# Patient Record
Sex: FEMALE | Race: WHITE | Employment: UNEMPLOYED | URBAN - METROPOLITAN AREA
[De-identification: names, ages, dates, MRNs, and addresses within clinical notes are randomized per-mention and may not be internally consistent; named-entity substitution may affect disease eponyms.]

---

## 2023-02-12 ENCOUNTER — HOSPITAL ENCOUNTER (INPATIENT)
Age: 58
LOS: 4 days | Discharge: ANOTHER HOSPITAL | DRG: 871 | End: 2023-02-16
Attending: EMERGENCY MEDICINE | Admitting: INTERNAL MEDICINE
Payer: MEDICARE

## 2023-02-12 ENCOUNTER — APPOINTMENT (OUTPATIENT)
Dept: CT IMAGING | Age: 58
DRG: 871 | End: 2023-02-12
Payer: MEDICARE

## 2023-02-12 ENCOUNTER — APPOINTMENT (OUTPATIENT)
Dept: GENERAL RADIOLOGY | Age: 58
DRG: 871 | End: 2023-02-12
Payer: MEDICARE
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
## 2023-02-12 DIAGNOSIS — E87.1 HYPONATREMIA: ICD-10-CM

## 2023-02-12 DIAGNOSIS — J81.0 ACUTE PULMONARY EDEMA (HCC): ICD-10-CM

## 2023-02-12 DIAGNOSIS — R65.20 SEVERE SEPSIS (HCC): ICD-10-CM

## 2023-02-12 DIAGNOSIS — A41.9 SEVERE SEPSIS (HCC): ICD-10-CM

## 2023-02-12 DIAGNOSIS — R11.2 NAUSEA AND VOMITING, UNSPECIFIED VOMITING TYPE: ICD-10-CM

## 2023-02-12 DIAGNOSIS — I95.9 HYPOTENSION, UNSPECIFIED HYPOTENSION TYPE: ICD-10-CM

## 2023-02-12 LAB
ALBUMIN SERPL-MCNC: 3.8 GM/DL (ref 3.4–5)
ALP BLD-CCNC: 89 IU/L (ref 40–129)
ALT SERPL-CCNC: 21 U/L (ref 10–40)
ANION GAP SERPL CALCULATED.3IONS-SCNC: 15 MMOL/L (ref 4–16)
AST SERPL-CCNC: 145 IU/L (ref 15–37)
BACTERIA: NEGATIVE /HPF
BASOPHILS ABSOLUTE: 0 K/CU MM
BASOPHILS RELATIVE PERCENT: 0.3 % (ref 0–1)
BILIRUB SERPL-MCNC: 1.2 MG/DL (ref 0–1)
BILIRUBIN URINE: ABNORMAL MG/DL
BLOOD, URINE: ABNORMAL
BUN SERPL-MCNC: 17 MG/DL (ref 6–23)
CALCIUM SERPL-MCNC: 9 MG/DL (ref 8.3–10.6)
CHLORIDE BLD-SCNC: 90 MMOL/L (ref 99–110)
CLARITY: CLEAR
CO2: 24 MMOL/L (ref 21–32)
COLOR: YELLOW
CREAT SERPL-MCNC: 0.8 MG/DL (ref 0.6–1.1)
DIFFERENTIAL TYPE: ABNORMAL
EOSINOPHILS ABSOLUTE: 0 K/CU MM
EOSINOPHILS RELATIVE PERCENT: 0 % (ref 0–3)
GFR SERPL CREATININE-BSD FRML MDRD: >60 ML/MIN/1.73M2
GLUCOSE SERPL-MCNC: 339 MG/DL (ref 70–99)
GLUCOSE, URINE: 500 MG/DL
HCT VFR BLD CALC: 42.3 % (ref 37–47)
HEMOGLOBIN: 12.9 GM/DL (ref 12.5–16)
IMMATURE NEUTROPHIL %: 0.6 % (ref 0–0.43)
KETONES, URINE: >80 MG/DL
LACTIC ACID, SEPSIS: 2.9 MMOL/L (ref 0.5–1.9)
LEUKOCYTE ESTERASE, URINE: NEGATIVE
LIPASE: 8 IU/L (ref 13–60)
LYMPHOCYTES ABSOLUTE: 0.5 K/CU MM
LYMPHOCYTES RELATIVE PERCENT: 5.8 % (ref 24–44)
MCH RBC QN AUTO: 26.4 PG (ref 27–31)
MCHC RBC AUTO-ENTMCNC: 30.5 % (ref 32–36)
MCV RBC AUTO: 86.5 FL (ref 78–100)
MONOCYTES ABSOLUTE: 0.5 K/CU MM
MONOCYTES RELATIVE PERCENT: 6 % (ref 0–4)
NITRITE URINE, QUANTITATIVE: NEGATIVE
NON SQUAM EPI CELLS: <1 /HPF
NUCLEATED RBC %: 0 %
PDW BLD-RTO: 13.4 % (ref 11.7–14.9)
PH, URINE: 6 (ref 5–8)
PLATELET # BLD: 132 K/CU MM (ref 140–440)
PMV BLD AUTO: 11.3 FL (ref 7.5–11.1)
POTASSIUM SERPL-SCNC: 4 MMOL/L (ref 3.5–5.1)
PROTEIN UA: >300 MG/DL
RAPID INFLUENZA  B AGN: NEGATIVE
RAPID INFLUENZA A AGN: NEGATIVE
RBC # BLD: 4.89 M/CU MM (ref 4.2–5.4)
RBC URINE: 1 /HPF (ref 0–6)
SARS-COV-2 RDRP RESP QL NAA+PROBE: NOT DETECTED
SEGMENTED NEUTROPHILS ABSOLUTE COUNT: 7.6 K/CU MM
SEGMENTED NEUTROPHILS RELATIVE PERCENT: 87.3 % (ref 36–66)
SODIUM BLD-SCNC: 129 MMOL/L (ref 135–145)
SOURCE: NORMAL
SPECIFIC GRAVITY UA: 1.02 (ref 1–1.03)
SQUAMOUS EPITHELIAL: 2 /HPF
TOTAL CK: 9595 IU/L (ref 26–140)
TOTAL IMMATURE NEUTOROPHIL: 0.05 K/CU MM
TOTAL NUCLEATED RBC: 0 K/CU MM
TOTAL PROTEIN: 7.5 GM/DL (ref 6.4–8.2)
TRICHOMONAS: NORMAL /HPF
UROBILINOGEN, URINE: 1 MG/DL (ref 0.2–1)
WBC # BLD: 8.7 K/CU MM (ref 4–10.5)
WBC UA: NORMAL /HPF (ref 0–5)

## 2023-02-12 PROCEDURE — 83605 ASSAY OF LACTIC ACID: CPT

## 2023-02-12 PROCEDURE — C1751 CATH, INF, PER/CENT/MIDLINE: HCPCS

## 2023-02-12 PROCEDURE — 96375 TX/PRO/DX INJ NEW DRUG ADDON: CPT

## 2023-02-12 PROCEDURE — 83690 ASSAY OF LIPASE: CPT

## 2023-02-12 PROCEDURE — 71045 X-RAY EXAM CHEST 1 VIEW: CPT

## 2023-02-12 PROCEDURE — 85025 COMPLETE CBC W/AUTO DIFF WBC: CPT

## 2023-02-12 PROCEDURE — 87150 DNA/RNA AMPLIFIED PROBE: CPT

## 2023-02-12 PROCEDURE — 87040 BLOOD CULTURE FOR BACTERIA: CPT

## 2023-02-12 PROCEDURE — 2140000000 HC CCU INTERMEDIATE R&B

## 2023-02-12 PROCEDURE — 6370000000 HC RX 637 (ALT 250 FOR IP): Performed by: EMERGENCY MEDICINE

## 2023-02-12 PROCEDURE — 99285 EMERGENCY DEPT VISIT HI MDM: CPT

## 2023-02-12 PROCEDURE — 6360000002 HC RX W HCPCS: Performed by: EMERGENCY MEDICINE

## 2023-02-12 PROCEDURE — 82550 ASSAY OF CK (CPK): CPT

## 2023-02-12 PROCEDURE — 96365 THER/PROPH/DIAG IV INF INIT: CPT

## 2023-02-12 PROCEDURE — 87635 SARS-COV-2 COVID-19 AMP PRB: CPT

## 2023-02-12 PROCEDURE — 2700000000 HC OXYGEN THERAPY PER DAY

## 2023-02-12 PROCEDURE — 96366 THER/PROPH/DIAG IV INF ADDON: CPT

## 2023-02-12 PROCEDURE — 87086 URINE CULTURE/COLONY COUNT: CPT

## 2023-02-12 PROCEDURE — 96367 TX/PROPH/DG ADDL SEQ IV INF: CPT

## 2023-02-12 PROCEDURE — 81001 URINALYSIS AUTO W/SCOPE: CPT

## 2023-02-12 PROCEDURE — 76937 US GUIDE VASCULAR ACCESS: CPT

## 2023-02-12 PROCEDURE — 70450 CT HEAD/BRAIN W/O DYE: CPT

## 2023-02-12 PROCEDURE — 72125 CT NECK SPINE W/O DYE: CPT

## 2023-02-12 PROCEDURE — 87186 SC STD MICRODIL/AGAR DIL: CPT

## 2023-02-12 PROCEDURE — 87804 INFLUENZA ASSAY W/OPTIC: CPT

## 2023-02-12 PROCEDURE — 96361 HYDRATE IV INFUSION ADD-ON: CPT

## 2023-02-12 PROCEDURE — 80053 COMPREHEN METABOLIC PANEL: CPT

## 2023-02-12 PROCEDURE — 2580000003 HC RX 258: Performed by: EMERGENCY MEDICINE

## 2023-02-12 RX ORDER — ONDANSETRON 2 MG/ML
4 INJECTION INTRAMUSCULAR; INTRAVENOUS ONCE
Status: COMPLETED | OUTPATIENT
Start: 2023-02-12 | End: 2023-02-12

## 2023-02-12 RX ORDER — DEXTROSE MONOHYDRATE 100 MG/ML
INJECTION, SOLUTION INTRAVENOUS CONTINUOUS PRN
Status: DISCONTINUED | OUTPATIENT
Start: 2023-02-12 | End: 2023-02-17 | Stop reason: HOSPADM

## 2023-02-12 RX ORDER — INSULIN LISPRO 100 [IU]/ML
0-8 INJECTION, SOLUTION INTRAVENOUS; SUBCUTANEOUS
Status: DISCONTINUED | OUTPATIENT
Start: 2023-02-13 | End: 2023-02-17 | Stop reason: HOSPADM

## 2023-02-12 RX ORDER — GLIPIZIDE 10 MG/1
10 TABLET, FILM COATED, EXTENDED RELEASE ORAL DAILY
COMMUNITY

## 2023-02-12 RX ORDER — SODIUM CHLORIDE 0.9 % (FLUSH) 0.9 %
5-40 SYRINGE (ML) INJECTION PRN
Status: DISCONTINUED | OUTPATIENT
Start: 2023-02-12 | End: 2023-02-17 | Stop reason: HOSPADM

## 2023-02-12 RX ORDER — FUROSEMIDE 40 MG/1
40 TABLET ORAL DAILY
COMMUNITY

## 2023-02-12 RX ORDER — ACETAMINOPHEN 500 MG
1000 TABLET ORAL
Status: COMPLETED | OUTPATIENT
Start: 2023-02-12 | End: 2023-02-12

## 2023-02-12 RX ORDER — SODIUM CHLORIDE 0.9 % (FLUSH) 0.9 %
5-40 SYRINGE (ML) INJECTION EVERY 12 HOURS SCHEDULED
Status: DISCONTINUED | OUTPATIENT
Start: 2023-02-13 | End: 2023-02-17 | Stop reason: HOSPADM

## 2023-02-12 RX ORDER — ACETAMINOPHEN 650 MG/1
650 SUPPOSITORY RECTAL EVERY 6 HOURS PRN
Status: DISCONTINUED | OUTPATIENT
Start: 2023-02-12 | End: 2023-02-17 | Stop reason: HOSPADM

## 2023-02-12 RX ORDER — 0.9 % SODIUM CHLORIDE 0.9 %
1000 INTRAVENOUS SOLUTION INTRAVENOUS ONCE
Status: COMPLETED | OUTPATIENT
Start: 2023-02-12 | End: 2023-02-12

## 2023-02-12 RX ORDER — SODIUM CHLORIDE 9 MG/ML
INJECTION, SOLUTION INTRAVENOUS PRN
Status: DISCONTINUED | OUTPATIENT
Start: 2023-02-12 | End: 2023-02-17 | Stop reason: HOSPADM

## 2023-02-12 RX ORDER — ACETAMINOPHEN 325 MG/1
650 TABLET ORAL EVERY 6 HOURS PRN
Status: DISCONTINUED | OUTPATIENT
Start: 2023-02-12 | End: 2023-02-17 | Stop reason: HOSPADM

## 2023-02-12 RX ORDER — CARVEDILOL 6.25 MG/1
6.25 TABLET ORAL 2 TIMES DAILY WITH MEALS
COMMUNITY

## 2023-02-12 RX ORDER — ENOXAPARIN SODIUM 100 MG/ML
30 INJECTION SUBCUTANEOUS 2 TIMES DAILY
Status: DISCONTINUED | OUTPATIENT
Start: 2023-02-13 | End: 2023-02-13

## 2023-02-12 RX ORDER — 0.9 % SODIUM CHLORIDE 0.9 %
1000 INTRAVENOUS SOLUTION INTRAVENOUS ONCE
Status: COMPLETED | OUTPATIENT
Start: 2023-02-12 | End: 2023-02-13

## 2023-02-12 RX ORDER — SODIUM CHLORIDE 9 MG/ML
INJECTION, SOLUTION INTRAVENOUS CONTINUOUS
Status: DISCONTINUED | OUTPATIENT
Start: 2023-02-13 | End: 2023-02-14

## 2023-02-12 RX ORDER — INSULIN LISPRO 100 [IU]/ML
0-4 INJECTION, SOLUTION INTRAVENOUS; SUBCUTANEOUS NIGHTLY
Status: DISCONTINUED | OUTPATIENT
Start: 2023-02-13 | End: 2023-02-17 | Stop reason: HOSPADM

## 2023-02-12 RX ADMIN — PIPERACILLIN AND TAZOBACTAM 3375 MG: 3; .375 INJECTION, POWDER, LYOPHILIZED, FOR SOLUTION INTRAVENOUS at 18:30

## 2023-02-12 RX ADMIN — SODIUM CHLORIDE 1000 ML: 9 INJECTION, SOLUTION INTRAVENOUS at 17:37

## 2023-02-12 RX ADMIN — ONDANSETRON 4 MG: 2 INJECTION INTRAMUSCULAR; INTRAVENOUS at 17:37

## 2023-02-12 RX ADMIN — ACETAMINOPHEN 1000 MG: 500 TABLET ORAL at 18:20

## 2023-02-12 ASSESSMENT — PAIN - FUNCTIONAL ASSESSMENT: PAIN_FUNCTIONAL_ASSESSMENT: NONE - DENIES PAIN

## 2023-02-12 NOTE — ED PROVIDER NOTES
EMERGENCY DEPARTMENT ENCOUNTER      CHIEF COMPLAINT:   Fall from bed  Nausea and vomiting    HPI: Stanislaw Anderson is a 62 y.o. female who presents to the emergency department, via EMS, after she was found on the floor by her family. The patient states that she fell out of bed last night and was unable to get up. She feels generally weak. She has been vomiting this morning. She hit her head but does not think she lost consciousness. She denies neck pain. She denies hematemesis or abdominal pain. She has had subjective fevers and chills. Symptoms have been intermittent. There are no exacerbating or alleviating factors. She denies any other injuries or complaints. REVIEW OF SYSTEMS:   Constitutional: See HPI  Eyes:  Denies change in visual acuity  HENT:  Denies nasal congestion or sore throat  Respiratory:  Denies cough or shortness of breath  Cardiovascular:  Denies chest pain or edema  GI: See HPI  :  Denies dysuria  Musculoskeletal:  Denies back pain or joint pain  Integument:  Denies rash  Neurologic:  Denies headache, focal weakness or sensory changes  \"Remaining review of systems reviewed and negative. I have reviewed the nursing triage documentation and agree unless otherwise noted below. \"      PAST MEDICAL HISTORY:   History reviewed. No pertinent past medical history. CURRENT MEDICATIONS:   Home medications reviewed. SURGICAL HISTORY:   History reviewed. No pertinent surgical history. FAMILY HISTORY:   History reviewed. No pertinent family history.     SOCIAL HISTORY:   Social History     Socioeconomic History    Marital status:      Spouse name: Not on file    Number of children: Not on file    Years of education: Not on file    Highest education level: Not on file   Occupational History    Not on file   Tobacco Use    Smoking status: Never    Smokeless tobacco: Never   Substance and Sexual Activity    Alcohol use: Not Currently    Drug use: Not Currently    Sexual activity: Not on file   Other Topics Concern    Not on file   Social History Narrative    Not on file     Social Determinants of Health     Financial Resource Strain: Not on file   Food Insecurity: Not on file   Transportation Needs: Not on file   Physical Activity: Not on file   Stress: Not on file   Social Connections: Not on file   Intimate Partner Violence: Not on file   Housing Stability: Not on file       ALLERGIES: Patient has no known allergies. PHYSICAL EXAM:  VITAL SIGNS:   ED Triage Vitals [02/12/23 1518]   Enc Vitals Group      BP (!) 157/91      Heart Rate (!) 123      Resp 22      Temp 100.2 °F (37.9 °C)      Temp Source Oral      SpO2 98 %      Weight 300 lb (136.1 kg)      Height 5' 5\" (1.651 m)      Head Circumference       Peak Flow       Pain Score       Pain Loc       Pain Edu? Excl. in [address]? Constitutional:  Non-toxic appearance, generally weak  HENT: Normocephalic, Atraumatic, Bilateral external ears normal, Oropharynx moist, No oral exudates, Nose normal.  Eyes:  PERRL,  Conjunctiva normal, No discharge. Neck: Normal range of motion, No tenderness, Supple, No stridor, No lymphadenopathy. Cardiovascular: Tachycardic, regular rhythm  Pulmonary/Chest: Lung sounds are diminished bilaterally with bibasilar crackles, tachypneic  Abdomen:   Bowel sounds normal, Soft, No tenderness, No masses, No pulsatile masses  Extremities:  Normal range of motion, Intact distal pulses, No edema, No tenderness  Neurologic:  Alert & oriented x 3, Normal motor function, Sensation intact to light touch throughout, No focal deficits  Skin:  Warm, Dry, No erythema, there are superficial scabbed wounds noted to bilateral shoulder blades, no surrounding erythema, no drainage      EKG Interpretation  None    Cardiac Monitor Strip Interpretation  Interpreted by me  Monitor strip interpreted for greater than 10 seconds  Rhythm: Sinus tachycardia  Rate: Tachycardic  Ectopy: none  ST Segments: normal      Radiology / Procedures:     XR CHEST PORTABLE (Final result)  Result time 02/12/23 18:23:10  Final result by Manuela Zamora MD (02/12/23 18:23:10)                Impression:    1. Cardiomegaly and findings favored to reflect pulmonary edema. Narrative:    EXAMINATION:   ONE XRAY VIEW OF THE CHEST     2/12/2023 5:54 pm     COMPARISON:   Chest x-ray September 19, 2008     HISTORY:   ORDERING SYSTEM PROVIDED HISTORY: Fall   TECHNOLOGIST PROVIDED HISTORY:   Reason for exam:->Fall   Reason for Exam: fall     FINDINGS:   Cardiac silhouette is enlarged but grossly stable given changes in technique   when compared with prior exam.  Central pulmonary vascular congestion and   mild interstitial opacities favored to reflect changes related to pulmonary   edema. No large effusion. No pneumothorax. CT CERVICAL SPINE WO CONTRAST (Final result)  Result time 02/12/23 17:33:37  Final result by Harles Ormond, MD (02/12/23 17:33:37)                Impression:    No acute abnormality of the cervical spine. Narrative:    EXAMINATION:   CT OF THE CERVICAL SPINE WITHOUT CONTRAST 2/12/2023 4:25 pm     TECHNIQUE:   CT of the cervical spine was performed without the administration of   intravenous contrast. Multiplanar reformatted images are provided for review. Automated exposure control, iterative reconstruction, and/or weight based   adjustment of the mA/kV was utilized to reduce the radiation dose to as low   as reasonably achievable. COMPARISON:   None. HISTORY:   ORDERING SYSTEM PROVIDED HISTORY: Fall from bed   TECHNOLOGIST PROVIDED HISTORY:   Reason for exam:->Fall from bed   Decision Support Exception - unselect if not a suspected or confirmed   emergency medical condition->Emergency Medical Condition (MA)   Reason for Exam: FALL, NECK PAIN     FINDINGS:   BONES/ALIGNMENT: There is no acute fracture or traumatic malalignment.      DEGENERATIVE CHANGES: Calcification of the posterior longitudinal ligament at   C3-C4, which causes eccentric narrowing of the spinal canal.     SOFT TISSUES: There is no prevertebral soft tissue swelling. CT HEAD WO CONTRAST (Final result)  Result time 02/12/23 17:29:56  Final result by Daiana Cardoso MD (02/12/23 17:29:56)                Impression:    No acute intracranial abnormality. Narrative:    EXAMINATION:   CT OF THE HEAD WITHOUT CONTRAST  2/12/2023 4:25 pm     TECHNIQUE:   CT of the head was performed without the administration of intravenous   contrast. Automated exposure control, iterative reconstruction, and/or weight   based adjustment of the mA/kV was utilized to reduce the radiation dose to as   low as reasonably achievable. COMPARISON:   None. HISTORY:   ORDERING SYSTEM PROVIDED HISTORY: Fall, hit head   TECHNOLOGIST PROVIDED HISTORY:   Has a \"code stroke\" or \"stroke alert\" been called? ->No   Reason for exam:->Fall, hit head   Decision Support Exception - unselect if not a suspected or confirmed   emergency medical condition->Emergency Medical Condition (MA)   Reason for Exam: Fall, hit head     FINDINGS:   BRAIN/VENTRICLES: There is no acute intracranial hemorrhage, mass effect or   midline shift. No abnormal extra-axial fluid collection. The gray-white   differentiation is maintained without evidence of an acute infarct. There is   no evidence of hydrocephalus. ORBITS: The visualized portion of the orbits demonstrate no acute abnormality. SINUSES: The visualized paranasal sinuses and mastoid air cells demonstrate   no acute abnormality. SOFT TISSUES/SKULL:  No acute abnormality of the visualized skull or soft   tissues.                    Labs Reviewed   CBC WITH AUTO DIFFERENTIAL - Abnormal; Notable for the following components:       Result Value    MCH 26.4 (*)     MCHC 30.5 (*)     Platelets 539 (*)     MPV 11.3 (*)     Segs Relative 87.3 (*)     Lymphocytes % 5.8 (*)     Monocytes % 6.0 (*)     Immature Neutrophil % 0.6 (*)     All other components within normal limits   COMPREHENSIVE METABOLIC PANEL - Abnormal; Notable for the following components:    Sodium 129 (*)     Chloride 90 (*)     Glucose 339 (*)     Total Bilirubin 1.2 (*)      (*)     All other components within normal limits   LIPASE - Abnormal; Notable for the following components:    Lipase 8 (*)     All other components within normal limits   URINALYSIS - Abnormal; Notable for the following components:    Glucose, Urine 500 (*)     Bilirubin Urine MODERATE NUMBER OR AMOUNT OBSERVED (*)     Ketones, Urine >80 (*)     Blood, Urine LARGE NUMBER OR AMOUNT OBSERVED (*)     Protein, UA >300 (*)     All other components within normal limits   LACTATE, SEPSIS - Abnormal; Notable for the following components:    Lactic Acid, Sepsis 2.9 (*)     All other components within normal limits   CK - Abnormal; Notable for the following components:    Total CK 9,595 (*)     All other components within normal limits   RAPID INFLUENZA A/B ANTIGENS   COVID-19, RAPID   CULTURE, URINE   CULTURE, BLOOD 1   CULTURE, BLOOD 2   MICROSCOPIC URINALYSIS   LACTATE, SEPSIS   BRAIN NATRIURETIC PEPTIDE   PROCALCITONIN   LACTIC ACID   LACTATE, SEPSIS   LACTATE, SEPSIS   COMPREHENSIVE METABOLIC PANEL W/ REFLEX TO MG FOR LOW K   CK   CBC WITH AUTO DIFFERENTIAL         ED COURSE & MEDICAL DECISION MAKING:  Carol Beriros is a 57 y.o. female who presents to the emergency department, via EMS, after she was found on the floor by her family.  The patient states that she fell out of bed last night and was unable to get up.  She feels generally weak.  She has been vomiting this morning.  She hit her head but does not think she lost consciousness.  She denies neck pain.  She denies hematemesis or abdominal pain.  She has had subjective fevers and chills.  Symptoms have been intermittent.  There are no exacerbating or alleviating factors.  She denies any other injuries or  complaints. History from : Patient    Limitations to history : None    Chronic conditions affecting care: None    Social Determinants : None    Records Reviewed : None    On exam, the patient has an elevated temperature of 100.2 °F.  She later spiked a fever of 102 °F.  She is tachycardic. She is hypertensive on arrival but is asymptomatic and later becomes hypotensive. She is tachypneic. She is otherwise hemodynamically stable and neurologically intact. Physical exam is significant for bilateral superficial scabbed lesions on her shoulder blades. Labs are obtained and are significant for a normal white blood cell count, a normal hemoglobin, elevated segmented neutrophils, mild hyponatremia that is likely pseudohyponatremia secondary to an elevated glucose of 339, slightly elevated bilirubin, and elevated lactic acid of 2.9 and an elevated CK of 9595. There are no other clinically significant lab abnormalities. Blood cultures are pending. CT head is negative for acute abnormality. CT cervical spine is negative for acute abnormality. Chest x-ray shows cardiomegaly and findings favored to reflect pulmonary edema. The patient was treated with medications as below a with some improvement. The patient was only given 1 L of IV normal saline in the emergency department despite being septic due to pulmonary edema seen on chest x-ray and tachypnea with bibasilar crackles.     Patient was given the following medications:  Medications   0.9 % sodium chloride bolus (1000 mLs IntraVENous Stopped 2/12/23 1931)   ondansetron (ZOFRAN) injection 4 mg (4 mg IntraVENous Given 2/12/23 1737)   piperacillin-tazobactam (ZOSYN) 3,375 mg in sodium chloride 0.9 % 50 mL IVPB (mini-bag) (0 mg IntraVENous Stopped 2/12/23 1903)   acetaminophen (TYLENOL) tablet 1,000 mg (1,000 mg Oral Given 2/12/23 1820)   vancomycin (VANCOCIN) 2,000 mg in sodium chloride 0.9 % 500 mL IVPB (0 mg IntraVENous Stopped 2/12/23 2213)     Diagnosis and Differential Diagnosis:  I suspect that the patient had a mechanical fall from bed and is in rhabdomyolysis. She also has a fever, severe sepsis and vomiting. She may have a GI source of her sepsis versus another undetermined source. I have a low suspicion for intracranial hemorrhage, skull fracture, cervical spine fracture dislocation, pneumonia, UTI or acute surgical abdomen. Disposition Considerations:  I recommended admission to the hospital and the patient was agreeable. I discussed the case with the hospitalist who will admit the patient for further treatment and care. She requested a CT of the chest, abdomen and pelvis which has been ordered. The patient is currently in stable condition awaiting admission. CRITICAL CARE NOTE:  There was a high probability of clinically significant life-threatening deterioration of the patient's condition requiring my urgent intervention. Total critical care time is 45 minutes  This includes vital sign monitoring, pulse oximetry monitoring, telemetry monitoring, clinical response to the IV medications, reviewing the nursing notes, consultation time, dictation/documetation time. (This time excludes time spent performing procedures). I am the Primary Clinician of Record. Clinical Impression:  1. Fall from bed, initial encounter    2. Nausea and vomiting, unspecified vomiting type    3. Severe sepsis ([address]    4. Hyponatremia    5. Acute pulmonary edema (HCC)    6. Hypotension, unspecified hypotension type    7. Rhabdomyolysis      Comment: Please note this report has been produced using speech recognition software and may contain errors related to that system including errors in grammar, punctuation, and spelling, as well as words and phrases that may be inappropriate. If there are any questions or concerns please feel free to contact the dictating provider for clarification.         Eliecer Calabrese MD  02/17/23 8812

## 2023-02-12 NOTE — ED NOTES
All of patient's clothing and linens removed as they were saturated in old urine. Pt noted to have wounds to bilateral shoulder blades- do not appear to be new. Pt is maximum assist and very weak.        Anibal Yip RN  02/12/23 7700

## 2023-02-13 ENCOUNTER — APPOINTMENT (OUTPATIENT)
Dept: GENERAL RADIOLOGY | Age: 58
DRG: 871 | End: 2023-02-13
Payer: MEDICARE

## 2023-02-13 ENCOUNTER — APPOINTMENT (OUTPATIENT)
Dept: CT IMAGING | Age: 58
DRG: 871 | End: 2023-02-13
Payer: MEDICARE
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
## 2023-02-13 PROBLEM — A41.01 SEPSIS DUE TO METHICILLIN SUSCEPTIBLE STAPHYLOCOCCUS AUREUS (MSSA) WITHOUT ACUTE ORGAN DYSFUNCTION (HCC): Status: ACTIVE | Noted: 2023-02-13

## 2023-02-13 PROBLEM — B95.61 STAPHYLOCOCCUS AUREUS BACTEREMIA: Status: ACTIVE | Noted: 2023-02-13

## 2023-02-13 PROBLEM — E66.01 CLASS 3 SEVERE OBESITY DUE TO EXCESS CALORIES WITH SERIOUS COMORBIDITY AND BODY MASS INDEX (BMI) OF 50.0 TO 59.9 IN ADULT (HCC): Status: ACTIVE | Noted: 2023-02-13

## 2023-02-13 PROBLEM — R78.81 STAPHYLOCOCCUS AUREUS BACTEREMIA: Status: ACTIVE | Noted: 2023-02-13

## 2023-02-13 LAB
ALBUMIN SERPL-MCNC: 3.6 GM/DL (ref 3.4–5)
ALP BLD-CCNC: 79 IU/L (ref 40–128)
ALT SERPL-CCNC: 28 U/L (ref 10–40)
ANION GAP SERPL CALCULATED.3IONS-SCNC: 16 MMOL/L (ref 4–16)
AST SERPL-CCNC: 224 IU/L (ref 15–37)
BASOPHILS ABSOLUTE: 0 K/CU MM
BASOPHILS RELATIVE PERCENT: 0.2 % (ref 0–1)
BILIRUB SERPL-MCNC: 1.1 MG/DL (ref 0–1)
BUN SERPL-MCNC: 25 MG/DL (ref 6–23)
CALCIUM SERPL-MCNC: 8.2 MG/DL (ref 8.3–10.6)
CHLORIDE BLD-SCNC: 94 MMOL/L (ref 99–110)
CO2: 24 MMOL/L (ref 21–32)
CREAT SERPL-MCNC: 1.3 MG/DL (ref 0.6–1.1)
DIFFERENTIAL TYPE: ABNORMAL
EOSINOPHILS ABSOLUTE: 0 K/CU MM
EOSINOPHILS RELATIVE PERCENT: 0 % (ref 0–3)
GFR SERPL CREATININE-BSD FRML MDRD: 48 ML/MIN/1.73M2
GLUCOSE BLD-MCNC: 216 MG/DL (ref 70–99)
GLUCOSE BLD-MCNC: 281 MG/DL (ref 70–99)
GLUCOSE BLD-MCNC: 341 MG/DL (ref 70–99)
GLUCOSE BLD-MCNC: 344 MG/DL (ref 70–99)
GLUCOSE SERPL-MCNC: 306 MG/DL (ref 70–99)
HCT VFR BLD CALC: 40.6 % (ref 37–47)
HEMOGLOBIN: 12.4 GM/DL (ref 12.5–16)
HIGH SENSITIVE C-REACTIVE PROTEIN: >300 MG/L (ref 0–5)
IMMATURE NEUTROPHIL %: 0.9 % (ref 0–0.43)
LACTIC ACID, SEPSIS: 2.1 MMOL/L (ref 0.5–1.9)
LACTIC ACID, SEPSIS: 2.4 MMOL/L (ref 0.5–1.9)
LYMPHOCYTES ABSOLUTE: 0.6 K/CU MM
LYMPHOCYTES RELATIVE PERCENT: 6.7 % (ref 24–44)
MCH RBC QN AUTO: 26.9 PG (ref 27–31)
MCHC RBC AUTO-ENTMCNC: 30.5 % (ref 32–36)
MCV RBC AUTO: 88.1 FL (ref 78–100)
MONOCYTES ABSOLUTE: 0.7 K/CU MM
MONOCYTES RELATIVE PERCENT: 8.7 % (ref 0–4)
NUCLEATED RBC %: 0 %
PDW BLD-RTO: 13.7 % (ref 11.7–14.9)
PHOSPHORUS: 3.2 MG/DL (ref 2.5–4.9)
PLATELET # BLD: 113 K/CU MM (ref 140–440)
PMV BLD AUTO: 10.9 FL (ref 7.5–11.1)
POTASSIUM SERPL-SCNC: 3.8 MMOL/L (ref 3.5–5.1)
RBC # BLD: 4.61 M/CU MM (ref 4.2–5.4)
SEGMENTED NEUTROPHILS ABSOLUTE COUNT: 6.8 K/CU MM
SEGMENTED NEUTROPHILS RELATIVE PERCENT: 83.5 % (ref 36–66)
SODIUM BLD-SCNC: 134 MMOL/L (ref 135–145)
TOTAL CK: ABNORMAL IU/L (ref 26–140)
TOTAL IMMATURE NEUTOROPHIL: 0.07 K/CU MM
TOTAL NUCLEATED RBC: 0 K/CU MM
TOTAL PROTEIN: 6.2 GM/DL (ref 6.4–8.2)
TSH SERPL DL<=0.005 MIU/L-ACNC: 1.14 UIU/ML (ref 0.27–4.2)
WBC # BLD: 8.2 K/CU MM (ref 4–10.5)

## 2023-02-13 PROCEDURE — 99223 1ST HOSP IP/OBS HIGH 75: CPT | Performed by: INTERNAL MEDICINE

## 2023-02-13 PROCEDURE — 2700000000 HC OXYGEN THERAPY PER DAY

## 2023-02-13 PROCEDURE — 80053 COMPREHEN METABOLIC PANEL: CPT

## 2023-02-13 PROCEDURE — 6360000004 HC RX CONTRAST MEDICATION: Performed by: EMERGENCY MEDICINE

## 2023-02-13 PROCEDURE — 84443 ASSAY THYROID STIM HORMONE: CPT

## 2023-02-13 PROCEDURE — 99222 1ST HOSP IP/OBS MODERATE 55: CPT | Performed by: INTERNAL MEDICINE

## 2023-02-13 PROCEDURE — 6360000002 HC RX W HCPCS: Performed by: STUDENT IN AN ORGANIZED HEALTH CARE EDUCATION/TRAINING PROGRAM

## 2023-02-13 PROCEDURE — APPSS60 APP SPLIT SHARED TIME 46-60 MINUTES: Performed by: NURSE PRACTITIONER

## 2023-02-13 PROCEDURE — 2140000000 HC CCU INTERMEDIATE R&B

## 2023-02-13 PROCEDURE — 36415 COLL VENOUS BLD VENIPUNCTURE: CPT

## 2023-02-13 PROCEDURE — 97166 OT EVAL MOD COMPLEX 45 MIN: CPT

## 2023-02-13 PROCEDURE — 85025 COMPLETE CBC W/AUTO DIFF WBC: CPT

## 2023-02-13 PROCEDURE — 84100 ASSAY OF PHOSPHORUS: CPT

## 2023-02-13 PROCEDURE — 94761 N-INVAS EAR/PLS OXIMETRY MLT: CPT

## 2023-02-13 PROCEDURE — 74177 CT ABD & PELVIS W/CONTRAST: CPT

## 2023-02-13 PROCEDURE — 71045 X-RAY EXAM CHEST 1 VIEW: CPT

## 2023-02-13 PROCEDURE — 97530 THERAPEUTIC ACTIVITIES: CPT

## 2023-02-13 PROCEDURE — 6370000000 HC RX 637 (ALT 250 FOR IP): Performed by: INTERNAL MEDICINE

## 2023-02-13 PROCEDURE — 2580000003 HC RX 258: Performed by: INTERNAL MEDICINE

## 2023-02-13 PROCEDURE — 6360000002 HC RX W HCPCS

## 2023-02-13 PROCEDURE — 97162 PT EVAL MOD COMPLEX 30 MIN: CPT

## 2023-02-13 PROCEDURE — 82550 ASSAY OF CK (CPK): CPT

## 2023-02-13 PROCEDURE — 97112 NEUROMUSCULAR REEDUCATION: CPT

## 2023-02-13 PROCEDURE — 83605 ASSAY OF LACTIC ACID: CPT

## 2023-02-13 PROCEDURE — 6360000002 HC RX W HCPCS: Performed by: INTERNAL MEDICINE

## 2023-02-13 PROCEDURE — 86141 C-REACTIVE PROTEIN HS: CPT

## 2023-02-13 PROCEDURE — 82962 GLUCOSE BLOOD TEST: CPT

## 2023-02-13 RX ORDER — INSULIN GLARGINE 100 [IU]/ML
5 INJECTION, SOLUTION SUBCUTANEOUS
Status: DISCONTINUED | OUTPATIENT
Start: 2023-02-13 | End: 2023-02-15

## 2023-02-13 RX ORDER — HEPARIN SODIUM 5000 [USP'U]/ML
5000 INJECTION, SOLUTION INTRAVENOUS; SUBCUTANEOUS EVERY 8 HOURS SCHEDULED
Status: DISCONTINUED | OUTPATIENT
Start: 2023-02-13 | End: 2023-02-17 | Stop reason: HOSPADM

## 2023-02-13 RX ORDER — ONDANSETRON 2 MG/ML
4 INJECTION INTRAMUSCULAR; INTRAVENOUS EVERY 6 HOURS PRN
Status: DISCONTINUED | OUTPATIENT
Start: 2023-02-13 | End: 2023-02-17 | Stop reason: HOSPADM

## 2023-02-13 RX ADMIN — ACETAMINOPHEN 650 MG: 325 TABLET ORAL at 05:04

## 2023-02-13 RX ADMIN — IOPAMIDOL 75 ML: 755 INJECTION, SOLUTION INTRAVENOUS at 02:36

## 2023-02-13 RX ADMIN — VANCOMYCIN HYDROCHLORIDE 1500 MG: 10 INJECTION, POWDER, LYOPHILIZED, FOR SOLUTION INTRAVENOUS at 16:54

## 2023-02-13 RX ADMIN — INSULIN LISPRO 4 UNITS: 100 INJECTION, SOLUTION INTRAVENOUS; SUBCUTANEOUS at 18:52

## 2023-02-13 RX ADMIN — PIPERACILLIN AND TAZOBACTAM 3375 MG: 3; .375 INJECTION, POWDER, LYOPHILIZED, FOR SOLUTION INTRAVENOUS at 12:24

## 2023-02-13 RX ADMIN — INSULIN LISPRO 6 UNITS: 100 INJECTION, SOLUTION INTRAVENOUS; SUBCUTANEOUS at 09:15

## 2023-02-13 RX ADMIN — SODIUM CHLORIDE: 9 INJECTION, SOLUTION INTRAVENOUS at 22:08

## 2023-02-13 RX ADMIN — SODIUM CHLORIDE: 9 INJECTION, SOLUTION INTRAVENOUS at 04:56

## 2023-02-13 RX ADMIN — HEPARIN SODIUM 5000 UNITS: 5000 INJECTION INTRAVENOUS; SUBCUTANEOUS at 22:06

## 2023-02-13 RX ADMIN — SODIUM CHLORIDE 1000 ML: 9 INJECTION, SOLUTION INTRAVENOUS at 02:40

## 2023-02-13 RX ADMIN — SODIUM CHLORIDE: 9 INJECTION, SOLUTION INTRAVENOUS at 09:49

## 2023-02-13 RX ADMIN — PIPERACILLIN AND TAZOBACTAM 3375 MG: 3; .375 INJECTION, POWDER, LYOPHILIZED, FOR SOLUTION INTRAVENOUS at 04:59

## 2023-02-13 RX ADMIN — ENOXAPARIN SODIUM 30 MG: 100 INJECTION SUBCUTANEOUS at 09:14

## 2023-02-13 RX ADMIN — SODIUM CHLORIDE: 9 INJECTION, SOLUTION INTRAVENOUS at 16:02

## 2023-02-13 RX ADMIN — SODIUM CHLORIDE, PRESERVATIVE FREE 10 ML: 5 INJECTION INTRAVENOUS at 22:07

## 2023-02-13 RX ADMIN — ONDANSETRON 4 MG: 2 INJECTION INTRAMUSCULAR; INTRAVENOUS at 02:09

## 2023-02-13 RX ADMIN — INSULIN GLARGINE 5 UNITS: 100 INJECTION, SOLUTION SUBCUTANEOUS at 09:15

## 2023-02-13 RX ADMIN — SODIUM CHLORIDE, PRESERVATIVE FREE 10 ML: 5 INJECTION INTRAVENOUS at 10:33

## 2023-02-13 RX ADMIN — INSULIN LISPRO 6 UNITS: 100 INJECTION, SOLUTION INTRAVENOUS; SUBCUTANEOUS at 12:37

## 2023-02-13 NOTE — CONSULTS
Infectious Disease Consult Note  2023   Patient Name: Pam Diaz : 1965   Impression  Severe Sepsis Secondary to MSSA Bacteremia Possible Pneumonia:  BREE Lung Nodule:  Cholelithiasis without Cholecystitis:  T-max 102.9 on , trended down to low grade temps  No leukocytosis  CrCl: 67  CK 9,595, 15,448  AST elevated 145, 224 likely from rhabdomyolysis   -Rapid Flu, COVID-19 Negative  Pct and CRP Pending  -BC  MSSA  -BC Pending  -MRSA/MSSA screen Pending  -Urine culture: Pending  -CT Chest A&P W contrast: 1. Linear bands of atelectasis/scarring in the right lower chest and some   scattered calcified granulomas without signs of pneumonia. 2.  A noncalcified 3 mm nodule in the left upper lobe would not require   further workup in a low risk patient. 3.  The liver is enlarged and portion of the right lateral abdomen including   the edge of the liver is cut off the edge of the exam.  No apparent mass or   nodularity within the liver. 4.  There is cholelithiasis but without fat stranding around the gallbladder   fossa. 5.  No bowel obstruction, pneumoperitoneum, or sign of diverticulitis. Some   gastritis in the stomach is suspected. 6.  Some stranding around both kidneys appear symmetric and may relate to   chronic renal disease. There is no hydronephrosis or hydroureter. 7.  Fatty atrophy of the pancreas.      RAMON:  Rhabdomyolysis s/p Fall:  Nephrology has been consulted    DMII:  Chronic Hypoxic Respiratory Failure:   Baseline oxygen 2L/min/nc  HTN/ HFpEF/ PHTN:  Morbid Obesity: BMI:  50.75  Multi-morbidity: per PMHx    Plan:  DC IV Zosyn  Continue IV vancomycin per pharmacy dosing  Will await final spp of Staph aureus, if MSSA, will transition to IV nafcillin continuous gtt until [facility] show NGTD  Trend CRP and Pct, ordered  Repeat BC until negative at 48 hours  Ordered repeat [facility]   ID recommends requesting cardiology consult for a JONI to evaluate for endocarditis as patient's habitus may be difficult to evaluate IE with TTE. MIGUEL ANGEL Alcazar    Thank you for allowing me to consult in the care of this patient.  ------------------------  REASON FOR CONSULT: Infective syndrome \"severe sepsis with rhabdo and staph aureus bacteremia\"  Requested by:  Anamika Mukherjee is a 62 y.o.  female with a history of HTN, DMII, PHTN, chronic HFpEF, morbid obesity who was admitted 2/12/2023 for further evaluation and management of patient was found on the floor by the patient's daughter, for probably 12 hours. She reported she fell out of her bed the night prior to admission, and was weak and unable to get herself up from the floor. She presented lethargic and intermittently confused. She was started on IV empiric ABX therapy of Zosyn and vancomycin. Her CK was initially 9,595 and has trended up to 15,448. She has RAMON with Cr 1.3, baseline 0.8. Her BC from 2/12 grew MSSA 4/4 bottles. CT Chest, A&P W IV contrast revealed cholelithiasis, a 3 mm BREE lung nodule, no bowel obstruction of pneumoperitoneum. No diverticulitis. Fatty atrophy of the pancrease. ? Infectious diseases service was consulted to evaluate the pt, and recommend further investigative and therapeutic measures. ROS: Other systems reviewed Including eyes, ENT, respiratory, cardiovascular, GI, , dermatologic, neurologic, psych, hem/lymphatic, musculoskeletal and endocrine were negative other than what is mentioned above. Patient Active Problem List    Diagnosis Date Noted    Severe sepsis without septic shock ([address] 02/12/2023     History reviewed. No pertinent past medical history. History reviewed. No pertinent surgical history. History reviewed. No pertinent family history. Infectious disease related family history - not contibutory.    SOCIAL HISTORY  Social History     Tobacco Use    Smoking status: Never    Smokeless tobacco: Never   Substance Use Topics    Alcohol use: Not Currently      [address]  Lives: [place] alone  Occupation: Disabled   No recent travel of significance. No recent unusual exposures. NO pets    ? ALLERGIES  No Known Allergies   MEDICATIONS  Reviewed and are per the chart/EMR. ? Antibiotics:   Present:  Vancomycin 2/12-  Past:  Zosyn 2/12-13?  -------------------------------------------------------------------------------------------------------------------    Vital Signs:  Vitals:    02/13/23 1000   BP: 114/81   Pulse: (!) 101   Resp: (!) 37   Temp:    SpO2: 96%         Exam:    VS: noted; wt 305 lb (138.3 kg) Height 5'5\"  Gen: alert and oriented X3, no distress  Skin: no stigmata of endocarditis  Wounds: C/D/I NA  HEMT: AT/NC Oropharynx pink, moist, and without lesions or exudates; dentition in good state of repair  Eyes: PERRLA, EOMI, conjunctiva pink, sclera anicteric. Neck: Supple. Trachea midline. No LAD. Chest: no distress and CTA. Good air movement. Oxygen per NC. Heart: RRR and no MRG. Abd: soft, non-distended, no tenderness, no hepatomegaly. Normoactive bowel sounds. Ext: no clubbing, cyanosis, or edema  Neuro: Mental status intact. CN 2-12 intact and no focal sensory or motor deficits    ? Diagnostic Studies: reviewed  2/12/2023 CT Head WO Contrast:  Impression   No acute intracranial abnormality. 2/12/2023 CT Cervical Spine WO Contrast:  Impression   No acute abnormality of the cervical spine. 2/12/2023 XR Chest Portable:  Impression   1. Cardiomegaly and findings favored to reflect pulmonary edema. 2/12/2023 CT Chest Abdomen Pelvis W Contrast:  Impression   1. Linear bands of atelectasis/scarring in the right lower chest and some   scattered calcified granulomas without signs of pneumonia. 2.  A noncalcified 3 mm nodule in the left upper lobe would not require   further workup in a low risk patient.        3.  The liver is enlarged and portion of the right lateral abdomen including   the edge of the liver is cut off the edge of the exam.  No apparent mass or   nodularity within the liver. 4.  There is cholelithiasis but without fat stranding around the gallbladder   fossa. 5.  No bowel obstruction, pneumoperitoneum, or sign of diverticulitis. Some   gastritis in the stomach is suspected. 6.  Some stranding around both kidneys appear symmetric and may relate to   chronic renal disease. There is no hydronephrosis or hydroureter. 7.  Fatty atrophy of the pancreas. 2/13/2023 XR Chest Portable;  ??  I have examined this patient and available medical records on this date and have made the above observations, conclusions and recommendations. Electronically signed by: Electronically signed by Jesse Turner.  MASON Bryan CNP on 2/13/2023 at 11:58 AM

## 2023-02-13 NOTE — ED NOTES
PICC team unsuccessful x2.   Hospitalist at bedside       [facility] [place]  02/12/23 [address] [place] [address]

## 2023-02-13 NOTE — PROGRESS NOTES
4 Eyes Skin Assessment     NAME:  Radha Guajardo  YOB: 1965  MEDICAL RECORD NUMBER:  [id number]    The patient is being assessed for  Admission    I agree that One RN has performed a thorough Head to Toe Skin Assessment on the patient. ALL assessment sites listed below have been assessed. Areas assessed by both nurses:    Head, Face, Ears, Shoulders, Back, Chest, Arms, Elbows, Hands, Sacrum. Buttock, Coccyx, Ischium, and Legs. Feet and Heels        Does the Patient have a Wound? Yes wound(s) were present on assessment.  LDA wound assessment was Initiated and completed by RN       Gerson Prevention initiated by RN: Yes   Wound Care Orders initiated by RN: Yes    Pressure Injury (Stage 3,4, Unstageable, DTI, NWPT, and Complex wounds) if present, place referral order by RN under : Yes    New and Established Ostomies, if present place, referral order under : No      Nurse 1 eSignature: Electronically signed by Nathalie Cao RN on 2/13/23 at 12:34 AM EST    **SHARE this note so that the co-signing nurse can place an eSignature**    Nurse 2 eSignature: Electronically signed by Vadim Tejada RN on 2/13/23 at 5:18 AM EST

## 2023-02-13 NOTE — CONSULTS
Consult canceled at this time. Unable to place PICC line. Attempted x 2 in [facility] without success, unable to pass the shoulder after multiple trouble shooting attempts. Dr Cummings Elmer notified. CVC to be place by Sayda Osorio. Please consult IV/PICC Team if patient's needs change.

## 2023-02-13 NOTE — H&P
History and Physical      Name:  Carol Berrios /Age/Sex: 1965  (57 y.o. female)   MRN & CSN:  [id number] & [id number] Encounter Date/Time: 2023 10:44 PM EST   Location:  [place] [address] PCP: No primary care provider on file.       Hospital Day: 1    Assessment and Plan:     #.  Sepsis: Source unclear  -Temp 102.9, tachycardia, tachypnea, lactic acid 2.9  -Blood culture sent from ER  -UA not suggestive of infection  -Chest x-ray-cardiomegaly and findings favored to reflect pulmonary edema  -CT chest/abdomen/pelvis-no acute process  -Influenza A and B- negative, rapid COVID-negative.  -Check procalcitonin  -Patient received broad-spectrum antibiotics in ER-vancomycin, Zosyn-continue    #.  Rhabdomyolysis  -CK 9595 at presentation  -Continue IV fluids  -Patient received only 1 L of fluid in ER, due to concern for CHF as chest x-ray was consistent with pulmonary edema  -Patient appears dry on examination.  Ordered 1 more liter of fluids  -Continue IV fluids at 150 cc/h.  -Monitor with repeat CPK.    #.  Fall  -Patient fell down from bed  -CT head, CT C-spine-no acute process    #.  Hypovolemic hyponatremia  -Continue IV fluid and repeat BMP    #.  Abnormal LFTs  -Could be secondary to volume depletion  -Monitor with repeat CMP.    #.  Hypertension  -Patient is on carvedilol.  -Hold as patient was hypotensive initially.    #.  Uncontrolled diabetes mellitus type 2  -Patient is on metformin, glipizide  -Continue insulin sliding scale with hypoglycemia protocol.  -Check HbA1c.    #.  Coronary artery disease  -Patient was admitted to [facility] for dyspnea, hypoxia, elevated troponin.  CTA revealed pulmonary hypertension, no PE  -Cardiac cath with mildly elevated LVEDP, no significant coronary artery disease.    #.  Chronic diastolic congestive heart failure  -Does not appear to be fluid overloaded  -Patient is on Lasix 40 mg daily.  -Repeat 2D echo.    #.  Pulmonary hypertension  #.  Obesity hypoventilation  syndrome  #. Chronic respiratory failure on home oxygen at 2 L/min. #.  Morbid obesity with BMI 49.92. Disposition:   Current Living situation: home    Diet Clear liquid diet   DVT Prophylaxis [x] Lovenox, []  Heparin, [] SCDs, [] Ambulation,  [] Eliquis, [] Xarelto   Code Status  FULL   Surrogate Decision Maker/ POA      History from:   EMR, patient. History of Present Illness:     Chief Complaint: Severe sepsis without septic shock ([facility] [address]  Mayra Garcia is a 62 y.o. female with hypertension, diabetes mellitus type 2, chronic diastolic congestive heart failure, pulmonary hypertension, obesity hypoventilation syndrome, morbid obesity was brought to ED by EMS after the patient was found on the floor. Patient is currently, awake, answering questions appropriately. But appears to be lethargic, intermittently confused. Patient reports that she fell down from her bed last night and was unable to get up. Patient was found by her daughter in the morning. Patient has been on the floor for more than 12 hours. Patient reported having nausea, vomiting. Patient denying any complaints no headache, visual disturbance, fever, chills, chest pain, shortness of breath, denied any abdominal pain, denied any urinary complaints, denied any constipation or diarrhea. At presentation patient was noted to have /91, , RR 22, temp 100.2, saturating 98% on room air. Later patient was noted to be hypotensive, BP down to 86/67. Patient received 1 L fluid bolus. BP improving. Tmax in ER-102.9. Lab work significant for sodium 129, lactic acid 2.9, random glucose 339, total CK9 595, , ALT 21, bilirubin 1.2, platelets 493. UA not suggestive of infection. Influenza a and B-negative, COVID negative. Chest x-ray findings consistent with pulmonary edema. CT chest abdomen/pelvis-no acute process. Blood cultures done in ED. Patient received 1 L NS bolus, Zofran, vancomycin, Zosyn.     Review of Systems: Need 10 Elements   10 point review of systems conducted and pertinent positives and negatives as per HPI. Objective: Intake/Output Summary (Last 24 hours) at 2/12/2023 2244  Last data filed at 2/12/2023 2213  Gross per 24 hour   Intake 1550 ml   Output --   Net 1550 ml      Vitals:   Vitals:    02/12/23 2030 02/12/23 2031 02/12/23 2102 02/12/23 2201   BP: 89/66 86/67 104/78 105/74   Pulse: (!) 101 (!) 101 100 96   Resp: (!) 36 (!) 32 (!) 31 25   Temp:       TempSrc:       SpO2: 98% 98% 96% 95%   Weight:       Height:           Medications Prior to Admission   Reviewed medications with patient    Prior to Admission medications    Not on File       Physical Exam: Need 8 Elements   Physical Exam     GEN  -Awake, alert, NAD.   EYES   -PERRL. HENT  -MM are very dry  RESP  -LS CTA equal bilat, no wheezes, rales or rhonchi. Symmetric chest movement. No respiratory distress noted. C/V  -S1/S2 auscultated, tachycardia without appreciable M/R/G. No JVD or carotid bruits. Peripheral pulses equal bilaterally and palpable. No peripheral edema. No reproducible chest wall tenderness. GI  -Abdomen is soft, non-distended, no significant tenderness. No masses or guarding. + BS in all quadrants. Rectal exam deferred.   -No CVA tenderness. Garcia catheter is not present. MS  -B/L extremities - No gross joint deformities. No swelling, intact sensation symmetrical.   SKIN  -Normal coloration, warm, dry. NEURO  - Awake, alert, oriented x 3, no focal deficits. PSYC  - Appropriate affect. Past Medical History:   Reviewed patient's past medical, surgical, social, family history and allergies.       PMHx hypertension, diabetes mellitus type 2, pulmonary hypertension, obesity hypoventilation syndrome  PSHX: None significant  Allergies: No known drug allergies  Fam HX: Hypertension, diabetes mellitus  Soc HX: Denied any smoking, alcohol, illicit drug use  Social History     Socioeconomic History    Marital status:  Spouse name: None    Number of children: None    Years of education: None    Highest education level: None   Tobacco Use    Smoking status: Never    Smokeless tobacco: Never   Substance and Sexual Activity    Alcohol use: Not Currently    Drug use: Not Currently       Medications:   Medications:    sodium chloride  1,000 mL IntraVENous Once      Infusions:   PRN Meds:      Labs      CBC:   Recent Labs     02/12/23  1640   WBC 8.7   HGB 12.9   *     BMP:    Recent Labs     02/12/23  1640   *   K 4.0   CL 90*   CO2 24   BUN 17   CREATININE 0.8   GLUCOSE 339*     Hepatic:   Recent Labs     02/12/23  1640   *   ALT 21   BILITOT 1.2*   ALKPHOS 89     Lipids: No results found for: CHOL, HDL, TRIG  Hemoglobin A1C: No results found for: LABA1C  TSH: No results found for: TSH  Troponin: No results found for: TROPONINT  Lactic Acid: No results for input(s): LACTA in the last 72 hours. BNP: No results for input(s): PROBNP in the last 72 hours.   UA:  Lab Results   Component Value Date/Time    NITRU NEGATIVE 02/12/2023 07:47 PM    COLORU YELLOW 02/12/2023 07:47 PM    WBCUA NONE SEEN 02/12/2023 07:47 PM    RBCUA 1 02/12/2023 07:47 PM    TRICHOMONAS NONE SEEN 02/12/2023 07:47 PM    BACTERIA NEGATIVE 02/12/2023 07:47 PM    CLARITYU CLEAR 02/12/2023 07:47 PM    SPECGRAV 1.025 02/12/2023 07:47 PM    LEUKOCYTESUR NEGATIVE 02/12/2023 07:47 PM    UROBILINOGEN 1.0 02/12/2023 07:47 PM    BILIRUBINUR MODERATE NUMBER OR AMOUNT OBSERVED 02/12/2023 07:47 PM    BLOODU LARGE NUMBER OR AMOUNT OBSERVED 02/12/2023 07:47 PM    KETUA >80 02/12/2023 07:47 PM     Urine Cultures: No results found for: Sigrid Perera  Blood Cultures: No results found for: BC  No results found for: BLOODCULT2  Organism: No results found for: ORG    Imaging/Diagnostics Last 24 Hours   CT HEAD WO CONTRAST    Result Date: 2/12/2023  EXAMINATION: CT OF THE HEAD WITHOUT CONTRAST  2/12/2023 4:25 pm TECHNIQUE: CT of the head was performed without the administration of intravenous contrast. Automated exposure control, iterative reconstruction, and/or weight based adjustment of the mA/kV was utilized to reduce the radiation dose to as low as reasonably achievable. COMPARISON: None. HISTORY: ORDERING SYSTEM PROVIDED HISTORY: Fall, hit head TECHNOLOGIST PROVIDED HISTORY: Has a \"code stroke\" or \"stroke alert\" been called? ->No Reason for exam:->Fall, hit head Decision Support Exception - unselect if not a suspected or confirmed emergency medical condition->Emergency Medical Condition (MA) Reason for Exam: Fall, hit head FINDINGS: BRAIN/VENTRICLES: There is no acute intracranial hemorrhage, mass effect or midline shift. No abnormal extra-axial fluid collection. The gray-white differentiation is maintained without evidence of an acute infarct. There is no evidence of hydrocephalus. ORBITS: The visualized portion of the orbits demonstrate no acute abnormality. SINUSES: The visualized paranasal sinuses and mastoid air cells demonstrate no acute abnormality. SOFT TISSUES/SKULL:  No acute abnormality of the visualized skull or soft tissues. No acute intracranial abnormality. CT CERVICAL SPINE WO CONTRAST    Result Date: 2/12/2023  EXAMINATION: CT OF THE CERVICAL SPINE WITHOUT CONTRAST 2/12/2023 4:25 pm TECHNIQUE: CT of the cervical spine was performed without the administration of intravenous contrast. Multiplanar reformatted images are provided for review. Automated exposure control, iterative reconstruction, and/or weight based adjustment of the mA/kV was utilized to reduce the radiation dose to as low as reasonably achievable. COMPARISON: None.  HISTORY: ORDERING SYSTEM PROVIDED HISTORY: Fall from bed TECHNOLOGIST PROVIDED HISTORY: Reason for exam:->Fall from bed Decision Support Exception - unselect if not a suspected or confirmed emergency medical condition->Emergency Medical Condition (MA) Reason for Exam: FALL, NECK PAIN FINDINGS: BONES/ALIGNMENT: There is no acute fracture or traumatic malalignment. DEGENERATIVE CHANGES: Calcification of the posterior longitudinal ligament at C3-C4, which causes eccentric narrowing of the spinal canal. SOFT TISSUES: There is no prevertebral soft tissue swelling. No acute abnormality of the cervical spine. XR CHEST PORTABLE    Result Date: 2/12/2023  EXAMINATION: ONE XRAY VIEW OF THE CHEST 2/12/2023 5:54 pm COMPARISON: Chest x-ray September 19, 2008 HISTORY: ORDERING SYSTEM PROVIDED HISTORY: Fall TECHNOLOGIST PROVIDED HISTORY: Reason for exam:->Fall Reason for Exam: fall FINDINGS: Cardiac silhouette is enlarged but grossly stable given changes in technique when compared with prior exam.  Central pulmonary vascular congestion and mild interstitial opacities favored to reflect changes related to pulmonary edema. No large effusion. No pneumothorax. 1. Cardiomegaly and findings favored to reflect pulmonary edema. Personally reviewed Lab Studies, Imaging, and discussed case with ED physician.     Electronically signed by Spencer Manning MD on 2/12/2023 at 10:44 PM

## 2023-02-13 NOTE — CONSULTS
[facility] PHYSICAL THERAPY EVALUATION  Nithin Sosa, 1965, 3130/3130-A, 2/13/2023    History  [place]:  The primary encounter diagnosis was Fall from bed, initial encounter. Diagnoses of Nausea and vomiting, unspecified vomiting type, Severe sepsis (HCC), Hyponatremia, Acute pulmonary edema ([address] and Hypotension, unspecified hypotension type were also pertinent to this visit. Patient  has no past medical history on file. Patient  has no past surgical history on file. Subjective:  Patient states:  \"I need that pan, I'm getting nauseas. \"    Pain:  denies pain. Communication with other providers:  Handoff to RN, OT  Restrictions: general precautions, fall risk    Home Setup/Prior level of function  Social/Functional History  Lives With: Alone  Type of Home: Apartment  Home Access: Level entry  Bathroom Shower/Tub: Tub/Shower unit  Bathroom Toilet: Standard  Has the patient had two or more falls in the past year or any fall with injury in the past year?: Yes  Receives Help From: Family  ADL Assistance: Independent  Homemaking Assistance: Independent  Homemaking Responsibilities: Yes  Ambulation Assistance: Independent  Transfer Assistance: Independent  Active : Yes  Occupation: On disability    Examination of body systems (includes body structures/functions, activity/participation limitations):  Observation:  Pt supine in bed upon arrival and agreeable to therapy  Vision:  WFL  Hearing:  [facility]  Cardiopulmonary:  2.5 L O2, wears 2 at baseline  Cognition: A&Ox4, delayed processing, impaired sequencing, see OT/SLP note for further evaluation. Musculoskeletal  ROM R/L:  WFL. Strength R LE 4+/5, LLE 3/5;  significant impairment in function and endurance.     Neuro:  L weaker compared to R, impaired cognition, sensation intact bilaterally, balance impaired      Mobility:  Rolling L/R:  max A with cues for hand placement and sequencing  Supine <-> sit:  mod A x2 with assist at bilateral LEs, hips, and trunk. Cues provided for sequencing and maintaining focus on task. Transfers: pt completed partial STS CGA impulsively this session. Pt deferred all other transfers this date due to nausea  Sitting balance:  fair, pt sat EOB ~10 minutes SBA to min A with frequent LOBs backward. Pt able to self correct most LOBs but others required min A to recover. Pt became nauseas when sitting and assisted to supine. Standing balance:  NT.    Gait: NT    [facility] 6 Clicks Inpatient Mobility:  AM-PAC Inpatient Mobility Raw Score : 8    Safety: patient left supine in bed with alarm on, tech in room, call light within reach, RN notified, gait belt used. Assessment:  Pt is a 62 y.o. female admitted to the hospital for severe sepsis without septic shock. Pt with fall at home and down for at least 12 hours. Pt is typically independent with all ambulation and transfers without a device. Pt is currently performing bed mobility mod A x2, sitting balance min A, unable to transfer, and unable to ambulate at this time. Pt is presenting with decreased endurance, impaired balance, impaired transfers, impaired bed mobility, impaired strength, decreased safety awareness, impaired processing, and impaired gait. Pt would benefit from continued acute care PT as well as ARU upon discharge to continue to address impairments as anticipate pt's functional mobility to improve as pt's mentation returns to baseline and nausea subsides. Complexity: moderate    Prognosis: Good, no significant barriers to participation at this time.      General Plan: 3-5 times per week/week     Equipment: TBD at next level of care    Goals:  Short Term Goals  Time Frame for Short Term Goals: 1 week  Short Term Goal 1: pt to complete all bed mobility min A x1  Short Term Goal 2: Pt to sit EOB 10 minutes SBA with no LOB  Short Term Goal 3: Pt to complete STS transfers to/from bed, commode, and chair mod A  Short Term Goal 4: Pt to ambulate 25' with LRAD mod A       Treatment plan:  Bed mobility, transfers, balance, gait, TA, TX    Recommendations for NURSING mobility: x2 transfer or liset    Time:   Time in: 1514  Time out: 1537  Timed treatment minutes: 10  Total time: 23    Electronically signed by:    Yves Cadet, PT  2/13/2023, 3:56 PM

## 2023-02-13 NOTE — CONSULTS
[facility] THERAPY EVALUATION    Niels Simms, 1965, 3130/3130-A, 2/13/2023      Discharge Recommendation: ARU    History:  [place]:  The primary encounter diagnosis was Fall from bed, initial encounter. Diagnoses of Nausea and vomiting, unspecified vomiting type, Severe sepsis (HCC), Hyponatremia, Acute pulmonary edema ([address] and Hypotension, unspecified hypotension type were also pertinent to this visit. History reviewed. No pertinent past medical history. Subjective:  Patient states:  \"This was my only fall\" - pt minimally conversational   Pain: declined   Communication with other providers: RN ok'd pepito, Coeval with PT for pt safety and tolerance, RN handoff   Restrictions: general precautions, fall risk, tele, O2 supp   No one at bedside    Home Setup/Prior level of function:  Social/Functional History  Lives With: Alone  Type of Home: Apartment  Home Access: Level entry  Bathroom Shower/Tub: Tub/Shower unit  Bathroom Toilet: Standard  Has the patient had two or more falls in the past year or any fall with injury in the past year?: Yes  Receives Help From: Family  ADL Assistance: [facility]: Independent  Homemaking Responsibilities: Yes  Ambulation Assistance: Independent  Transfer Assistance: Independent  Active : Yes  Occupation: On disability    Examination:  Observation: Pt was in bed upon arrival, agreeable to session  Vision: WFL  Hearing: WFL  Objective Measures: 97% SpO2 at rest on 2.5L O2 supp, pt usually uses 2L O2 at baseline     Body Systems and functions:  ROM: WFL in BUE  Strength: 3+/5 in BUE  Sensation: WFL  Tone: normotonic in BUE  Coordination: movements fluid and coordinated  Posture: normal posture  Activity Tolerance: fair     Activities of Daily Living (ADLs):  Feeding: SetupA   Grooming: SetupA   Toileting: DEP   UB dressing: MaxA   LB dressing: DEP   UB bathing: MaxA   LB bathing: MaxA       *pt ADL function inferred from gross functional assessment of mobility, balance, posture, safety awareness, activity tolerance (unless otherwise indicated)    Cognitive and Psychosocial Functioning:  Overall cognitive status:    02/13/23 1658   Orientation   Orientation Level Oriented X4   Cognition   Overall Cognitive Status Exceptions   Arousal/Alertness Delayed responses to stimuli   Following Commands Follows one step commands with increased time   Attention Span Attends with cues to redirect; Difficulty attending to directions   Memory Appears intact   Safety Judgement Decreased awareness of need for safety;Decreased awareness of need for assistance   Problem Solving Decreased awareness of errors;Assistance required to correct errors made   Insights Decreased awareness of deficits   Initiation Requires cues for all   Sequencing Requires cues for all   Cognition Comment Pt demo delayed processing overall. Affect: flat but pleasant     Balance:   Sitting: fair-   Standing: Deferred this date d/t patient fatigue and nausea     Functional Mobility:  Rolling Laterally in Bed: MaxA   Supine to Sit: ModAx2   Sit to Stand: Deferred this date d/t patient fatigue and nausea     Ambulation: NT       AM-PAC 6 click short form for inpatient daily activity:   How much help from another person does the patient currently need. .. Unable  Dep A Lot  Max A A Lot   Mod A A Little  Min A A Little   CGA  SBA None   Mod I  Indep  Sup   1. Putting on and taking off regular lower body clothing? [x] 1    [] 2   [] 2   [] 3   [] 3   [] 4      2. Bathing (including washing, rinsing, drying)? [] 1   [x] 2   [] 2 [] 3 [] 3 [] 4   3. Toileting, which includes using toilet, bedpan, or urinal? [x] 1    [] 2   [] 2   [] 3   [] 3   [] 4     4. Putting on and taking off regular upper body clothing? [] 1   [x] 2   [] 2   [] 3   [] 3    [] 4      5. Taking care of personal grooming such as brushing teeth? [] 1   [] 2    [] 2 [] 3    [x] 3   [] 4      6.  Eating meals?   [] 1   [] 2   [] 2   [] 3   [x] 3   [] 4        Raw Score:  12      24/24 = unimpaired  23/24 = 1-20% impaired   20/24-22/24 = 21-40% impaired  15/24-19/24 = 41-59% impaired   10/24-14/24 = 60%-79% impaired  7/24-9/24 = 80%-99% impaired  6/24 = 100% impaired     Treatment:  Therapeutic Activity Training:   Therapeutic activity training was instructed today. Cues were given for safety, sequence, UE/LE placement, visual cues, and balance. Activities performed today included:    Bed mobility:  Pt completed sup to sit with ModAx2 for BLE, hips, and trunk. Cues provided throughout for sequencing and to heed task. Pt returned to sup with ModAx2 for BLE, hips, and trunk. Pt placed in trendelenburg and scooted to [facility] with DEPx2. Scooting:  Pt demo poor scooting at EOB and unable to complete. Pt required MaxA for scooting at EOB. Seated balance:  Pt demo fair- seated balance and Valeriy d/t retro lean seated EOB. Pt tolerated ~10 minutes and progressed to SBA. Pt c/o nausea at EOB x2. Self Care Training:   Self care training was performed today. Cues were given for safety, sequence, UE/LE placement, visual cues, and balance. Activities performed today included:    LB dressing:  Pt donned/ doffed bilat nonslip socks with DEP assist seated EOB and in long-seated d/t dec functional mobility and impaired balance. UB dressing:  Pt doffed/donned new gown seated EOB with DEP assist.       Education: Role of OT, OT POC, discharge needs, safety, benefits of EOB/OOB activity, AE needs    Safety Measures: Gait belt used for safety of pt and therapist, Left in bed, Alarm in place, call light and phone within reach, lines managed, needs met     Assessment:  Pt is a 62 yr old female from home who presents with Fall from bed, initial encounter.  Diagnoses of Nausea and vomiting, unspecified vomiting type, Severe sepsis (HCC), Hyponatremia, Acute pulmonary edema ([address] and Hypotension, unspecified hypotension type were also pertinent to this visit. Prior to admission, pt was grossly independent and ambulated without AD. Pt currently performed bed mobility ModAx2, up to Valeriy for seated balance, MaxA for ADLs, and deferred standing d/t significant fatigue and nausea. Pt would benefit from ARU admission to maximize functional return s/p fall and safely return pt to typical baseline. Pt would benefit from continued IP OT services during their stay, and discharge to ARU.     Complexity: Moderate   Prognosis: Good   Barriers: strength, endurance     Plan:  Plan: 3+/week    Treatment to include: Strengthening, ROM, Balance Training, Functional Mobility Training, Endurance Training, Gait Training, Pain Management, Safety Education and Training, Patient+Caregiver Education and Training, Equipment Evaluation Education and Procurement, Positioning, Self Care Training, Home Management Training, Coordination Training    Pt would benefit from continued edu on: falls prevention   Adaptive Equipment Recommendations: defer to next LOC     Goals:  Time frame for goals: 2 weeks    Pt will complete grooming tasks with CGA at standing level without LOB   Pt will complete toileting tasks with MaxA using BSC  Pt will complete UB dressing tasks with ModA seated EOB   Pt will complete LB dressing tasks with MaxA seated EOB and AE PRN   Pt will complete UB bathing tasks with ModA seated and AE PRN   Pt will complete LB bathing tasks with ModA seated and AE PRN   Pt will complete therapeutic exercise/activity to increase independence in ADL/IADL function  Pt will practice functional transfers and mobility with AD for increased safety and independence    Time:   Time in: 1514  Time out: 1538  Treatment Minutes: 14  Evaluation Minutes: 10  Total time: 24    Electronically signed by:    DANNY Jacob   License: [id number]  8/86/5894, 3:16 PM

## 2023-02-13 NOTE — ED NOTES
ED TO INPATIENT SBAR HANDOFF    Patient Name: Julieta Calle   :  1965  62 y.o. MRN:  [id number]  Preferred Name  [facility]  ED Room #:  TR01/01TR-01  Family/Caregiver Present no   Restraints no   Sitter no   Sepsis Risk Score Sepsis Risk Score: 4.36    Situation  Code Status: No Order No additional code details. Allergies: Patient has no known allergies. Weight: Patient Vitals for the past 96 hrs (Last 3 readings):   Weight   23 1518 300 lb (136.1 kg)     Arrived from: home  Chief Complaint:   Chief Complaint   Patient presents with   [facility] Lobe Fall     \"Slide out of bed. \" Pt found down by family    Emesis     Since this morning     Hospital Problem/Diagnosis:  Active Problems:    * No active hospital problems. *  Resolved Problems:    * No resolved hospital problems. *    Imaging:   XR CHEST PORTABLE   Final Result   1. Cardiomegaly and findings favored to reflect pulmonary edema. CT CERVICAL SPINE WO CONTRAST   Final Result   No acute abnormality of the cervical spine. CT HEAD WO CONTRAST   Final Result   No acute intracranial abnormality.            Abnormal labs:   Abnormal Labs Reviewed   CBC WITH AUTO DIFFERENTIAL - Abnormal; Notable for the following components:       Result Value    MCH 26.4 (*)     MCHC 30.5 (*)     Platelets 713 (*)     MPV 11.3 (*)     Segs Relative 87.3 (*)     Lymphocytes % 5.8 (*)     Monocytes % 6.0 (*)     Immature Neutrophil % 0.6 (*)     All other components within normal limits   COMPREHENSIVE METABOLIC PANEL - Abnormal; Notable for the following components:    Sodium 129 (*)     Chloride 90 (*)     Glucose 339 (*)     Total Bilirubin 1.2 (*)      (*)     All other components within normal limits   LIPASE - Abnormal; Notable for the following components:    Lipase 8 (*)     All other components within normal limits   URINALYSIS - Abnormal; Notable for the following components:    Glucose, Urine 500 (*)     Bilirubin Urine MODERATE NUMBER OR AMOUNT OBSERVED (*)     Ketones, Urine >80 (*)     Blood, Urine LARGE NUMBER OR AMOUNT OBSERVED (*)     Protein, UA >300 (*)     All other components within normal limits   LACTATE, SEPSIS - Abnormal; Notable for the following components:    Lactic Acid, Sepsis 2.9 (*)     All other components within normal limits   CK - Abnormal; Notable for the following components: Total CK 9,595 (*)     All other components within normal limits     Critical values: yes     Abnormal Assessment Findings: weakness (generalized), intermittent confusion, dark urine, wounds to back    Background  History: History reviewed. No pertinent past medical history. Assessment    Vitals/MEWS: MEWS Score: 7  Level of Consciousness: Alert (0)   Vitals:    02/12/23 1923 02/12/23 1931 02/12/23 2001 02/12/23 2026   BP: 95/71 89/71 98/65 89/66   Pulse: (!) 119 (!) 115 (!) 109 (!) 104   Resp: (!) 35 (!) 38 (!) 33 30   Temp:    (!) 101.4 °F (38.6 °C)   TempSrc:    Axillary   SpO2: 98% 98% 98% 98%   Weight:       Height:         FiO2 (%): increased work of breathing and   O2 Flow Rate: O2 Device: Nasal cannula O2 Flow Rate (L/min): 2 L/min  Cardiac Rhythm: sinus tach  Pain Assessment:  [x] Verbal [] Lois Spray Scale  Pain Scale: Pain Assessment  Pain Assessment: None - Denies Pain  Last documented pain score (0-10 scale)    Last documented pain medication administered:   Mental Status: disoriented and able to concentrate and follow conversation  NIH Score: NIH     C-SSRS: Risk of Suicide: No Risk  Bedside swallow:    Edmundo Coma Scale (GCS): Lyford Coma Scale  Eye Opening: Spontaneous  Best Verbal Response: Oriented  Best Motor Response: Obeys commands  Lyford Coma Scale Score: 15  Active LDA's:   Peripheral IV 02/12/23 Left Antecubital (Active)   Site Assessment Clean, dry & intact 02/12/23 1658   Line Status Blood return noted; Flushed;Normal saline locked;Specimen collected 02/12/23 1658   Phlebitis Assessment No symptoms 02/12/23 1658 Infiltration Assessment 0 02/12/23 1658   Dressing Status New dressing applied;Clean, dry & intact 02/12/23 1658   Dressing Type Transparent;Gauze 02/12/23 1658   Dressing Intervention New 02/12/23 1658     PO Status: see orders  Pertinent or High Risk Medications/Drips: no   o If Yes, please provide details:   Pending Blood Product Administration: no     You may also review the ED PT Care Timeline found under the Summary Nursing Index tab. Recommendation    Pending orders   Plan for Discharge (if known):    Additional Comments:   If any further questions, please call Sending RN at [phone]    Electronically signed by: Electronically signed by Sachin Figueroa RN on 2/12/2023 at 8:29 PM     Sachin Figueroa RN  02/12/23 7037     Sachin Figueroa RN  02/12/23 5543

## 2023-02-13 NOTE — ED NOTES
ED TO INPATIENT SBAR HANDOFF    Patient Name: Wilton Lazar   :  1965  62 y.o. MRN:  [id number]  Preferred Name  [facility]  ED Room #:  TR01/01TR-01  Family/Caregiver Present no   Restraints no   Sitter no   Sepsis Risk Score Sepsis Risk Score: 4.36    Situation  Code Status: No Order No additional code details. Allergies: Patient has no known allergies. Weight: Patient Vitals for the past 96 hrs (Last 3 readings):   Weight   23 1518 300 lb (136.1 kg)     Arrived from: home  Chief Complaint:   Chief Complaint   Patient presents with   [facility] Fall     \"Slide out of bed. \" Pt found down by family    Emesis     Since this morning     Hospital Problem/Diagnosis:  Active Problems:    * No active hospital problems. *  Resolved Problems:    * No resolved hospital problems. *    Imaging:   XR CHEST PORTABLE   Final Result   1. Cardiomegaly and findings favored to reflect pulmonary edema. CT CERVICAL SPINE WO CONTRAST   Final Result   No acute abnormality of the cervical spine. CT HEAD WO CONTRAST   Final Result   No acute intracranial abnormality.            Abnormal labs:   Abnormal Labs Reviewed   CBC WITH AUTO DIFFERENTIAL - Abnormal; Notable for the following components:       Result Value    MCH 26.4 (*)     MCHC 30.5 (*)     Platelets 839 (*)     MPV 11.3 (*)     Segs Relative 87.3 (*)     Lymphocytes % 5.8 (*)     Monocytes % 6.0 (*)     Immature Neutrophil % 0.6 (*)     All other components within normal limits   COMPREHENSIVE METABOLIC PANEL - Abnormal; Notable for the following components:    Sodium 129 (*)     Chloride 90 (*)     Glucose 339 (*)     Total Bilirubin 1.2 (*)      (*)     All other components within normal limits   LIPASE - Abnormal; Notable for the following components:    Lipase 8 (*)     All other components within normal limits   URINALYSIS - Abnormal; Notable for the following components:    Glucose, Urine 500 (*)     Bilirubin Urine MODERATE NUMBER OR AMOUNT OBSERVED (*)     Ketones, Urine >80 (*)     Blood, Urine LARGE NUMBER OR AMOUNT OBSERVED (*)     Protein, UA >300 (*)     All other components within normal limits   LACTATE, SEPSIS - Abnormal; Notable for the following components:    Lactic Acid, Sepsis 2.9 (*)     All other components within normal limits   CK - Abnormal; Notable for the following components: Total CK 9,595 (*)     All other components within normal limits     Critical values: yes     Abnormal Assessment Findings: weakness (generalized), intermittent confusion, dark urine, wounds to back    Background  History: History reviewed. No pertinent past medical history. Assessment    Vitals/MEWS: MEWS Score: 7  Level of Consciousness: Alert (0)   Vitals:    02/12/23 1923 02/12/23 1931 02/12/23 2001 02/12/23 2026   BP: 95/71 89/71 98/65 89/66   Pulse: (!) 119 (!) 115 (!) 109 (!) 104   Resp: (!) 35 (!) 38 (!) 33 30   Temp:    (!) 101.4 °F (38.6 °C)   TempSrc:    Axillary   SpO2: 98% 98% 98% 98%   Weight:       Height:         FiO2 (%): increased work of breathing and   O2 Flow Rate: O2 Device: Nasal cannula O2 Flow Rate (L/min): 2 L/min  Cardiac Rhythm: sinus tach  Pain Assessment:  [x] Verbal [] Kirk Iain Scale  Pain Scale: Pain Assessment  Pain Assessment: None - Denies Pain  Last documented pain score (0-10 scale)    Last documented pain medication administered:   Mental Status: disoriented and able to concentrate and follow conversation  NIH Score: NIH     C-SSRS: Risk of Suicide: No Risk  Bedside swallow:    Edmundo Coma Scale (GCS): Vevay Coma Scale  Eye Opening: Spontaneous  Best Verbal Response: Oriented  Best Motor Response: Obeys commands  Vevay Coma Scale Score: 15  Active LDA's:   Peripheral IV 02/12/23 Left Antecubital (Active)   Site Assessment Clean, dry & intact 02/12/23 1658   Line Status Blood return noted; Flushed;Normal saline locked;Specimen collected 02/12/23 1658   Phlebitis Assessment No symptoms 02/12/23 1658 Infiltration Assessment 0 02/12/23 1658   Dressing Status New dressing applied;Clean, dry & intact 02/12/23 1658   Dressing Type Transparent;Gauze 02/12/23 1658   Dressing Intervention New 02/12/23 1658     PO Status: see orders  Pertinent or High Risk Medications/Drips: no   o If Yes, please provide details:   Pending Blood Product Administration: no     You may also review the ED PT Care Timeline found under the Summary Nursing Index tab. Recommendation    Pending orders   Plan for Discharge (if known):    Additional Comments:   If any further questions, please call Sending RN at [phone]    Electronically signed by: Electronically signed by Val Fagan RN on 2/12/2023 at 8:29 PM     Val Fagan RN  02/12/23 2031

## 2023-02-13 NOTE — CONSULTS
Chart reviewed full note to follow                      Name:  Madhu Levi /Age/Sex: 1965  (62 y.o. female)   MRN & CSN:  [id number] & [id number] Admission Date/Time: 2023  3:17 PM   [facility] PCP: No primary care provider on file. Hospital Day: 2          Referring physician:  Sapna Friend MD         Reason for consultation: For JONI        Thanks for referral.  Syncope shortness of breath  Information source: Patient    CC; syncope shortness of breath      HPI:   Thank you for involving me in taking  care of Madhu Levi who  is a 62 y. o.year  Old female  Presents with history of hypertension, diabetes, HFpEF pulmonary hypertension was found on floor was awake in the ED is found to be septic and the cultures are positive hence cardiology been consulted for JONI                     Past medical history:    has no past medical history on file. Past surgical history:   has no past surgical history on file. Social History:   reports that she has never smoked. She has never used smokeless tobacco. She reports that she does not currently use alcohol. She reports that she does not currently use drugs. Family history:  family history is not on file.     No Known Allergies    ondansetron (ZOFRAN) injection 4 mg, Q6H PRN  insulin glargine (LANTUS) injection vial 5 Units, Daily with breakfast  vancomycin (VANCOCIN) 1,500 mg in sodium chloride 0.9 % 500 mL IVPB, Q24H  heparin (porcine) injection 5,000 Units, 3 times per day  sodium chloride flush 0.9 % injection 5-40 mL, 2 times per day  sodium chloride flush 0.9 % injection 5-40 mL, PRN  0.9 % sodium chloride infusion, PRN  acetaminophen (TYLENOL) tablet 650 mg, Q6H PRN   Or  acetaminophen (TYLENOL) suppository 650 mg, Q6H PRN  0.9 % sodium chloride infusion, Continuous  insulin lispro (HUMALOG) injection vial 0-8 Units, TID WC  insulin lispro (HUMALOG) injection vial 0-4 Units, Nightly  glucose chewable tablet 16 g, PRN  dextrose bolus 10% 125 mL, PRN   Or  dextrose bolus 10% 250 mL, PRN  glucagon (rDNA) injection 1 mg, PRN  dextrose 10 % infusion, Continuous PRN    Current Facility-Administered Medications   Medication Dose Route Frequency Provider Last Rate Last Admin    ondansetron (ZOFRAN) injection 4 mg  4 mg IntraVENous Q6H PRN MASON Erwin CNP   4 mg at 02/13/23 0209    insulin glargine (LANTUS) injection vial 5 Units  5 Units SubCUTAneous Daily with breakfast Chava Nguyen MD   5 Units at 02/13/23 0915    vancomycin (VANCOCIN) 1,500 mg in sodium chloride 0.9 % 500 mL IVPB  1,500 mg IntraVENous Q24H Chava Nguyen  mL/hr at 02/13/23 1654 1,500 mg at 02/13/23 1654    heparin (porcine) injection 5,000 Units  5,000 Units SubCUTAneous 3 times per day Brenna Ellis MD        sodium chloride flush 0.9 % injection 5-40 mL  5-40 mL IntraVENous 2 times per day Chava Nguyen MD   10 mL at 02/13/23 1033    sodium chloride flush 0.9 % injection 5-40 mL  5-40 mL IntraVENous PRN Chava Nguyen MD        0.9 % sodium chloride infusion   IntraVENous PRN Chava Nguyen MD        acetaminophen (TYLENOL) tablet 650 mg  650 mg Oral Q6H PRN Chava Nguyen MD   650 mg at 02/13/23 0504    Or    acetaminophen (TYLENOL) suppository 650 mg  650 mg Rectal Q6H PRN Chava Nguyen MD        0.9 % sodium chloride infusion   IntraVENous Continuous Chava Nguyen  mL/hr at 02/13/23 1602 New Bag at 02/13/23 1602    insulin lispro (HUMALOG) injection vial 0-8 Units  0-8 Units SubCUTAneous TID WC Chava Nguyen MD   6 Units at 02/13/23 1237    insulin lispro (HUMALOG) injection vial 0-4 Units  0-4 Units SubCUTAneous Nightly Kodi Mariee MD        glucose chewable tablet 16 g  4 tablet Oral PRN Chava Nguyen MD        dextrose bolus 10% 125 mL  125 mL IntraVENous PRN Chava Nguyen MD        Or    dextrose bolus 10% 250 mL  250 mL IntraVENous PRN Chava Nguyen MD glucagon (rDNA) injection 1 mg  1 mg SubCUTAneous PRN Dre Rollins MD        dextrose 10 % infusion   IntraVENous Continuous PRN Dre Rollins MD         Review of Systems:  All 14 systems reviewed, all negative except for short of breath    Physical Examination:    BP (!) 142/45   Pulse (!) 104   Temp 98.4 °F (36.9 °C) (Oral)   Resp 27   Ht 5' 5\" (1.651 m)   Wt (!) 305 lb (138.3 kg)   SpO2 98%   BMI 50.75 kg/m²      Wt Readings from Last 3 Encounters:   02/13/23 (!) 305 lb (138.3 kg)     Body mass index is 50.75 kg/m². General Appearance: Fair  Head: normocephalic     Eyes: normal, noninjected conjunctiva    ENT: normal mucosa, noninjected throat, normal     NECK: No JVP  No thyromegaly        Cardiovascular: No thrills palpated   Auscultation: Normal S1 and S2, no murmur   carotid bruit no   Abdominal Aorta no bruit    Respiratory:    Breath sounds diminished bilaterally    Extremities: Trace edema clubbing ,   no cyanosis    SKIN: Warm and well perfused, no pallor or cyanosis    Vascular exam:  Pedal Pulses: Palp bilaterally        Abdomen:  No masses or tenderness. No organomegaly noted. Neurological:  Oriented to time, place, and person   No focal neurological deficit noted.   Psychiatric:normal mood, no anxiety    Lab Review   Recent Results (from the past 24 hour(s))   Rapid Flu Swab    Collection Time: 02/12/23  7:25 PM    Specimen: Nasopharyngeal   Result Value Ref Range    Rapid Influenza A Ag NEGATIVE NEGATIVE    Rapid Influenza B Ag NEGATIVE NEGATIVE   COVID-19, Rapid    Collection Time: 02/12/23  7:25 PM    Specimen: Nasopharyngeal   Result Value Ref Range    Source NARES     SARS-CoV-2, NAAT NOT DETECTED NOT DETECTED   Urinalysis    Collection Time: 02/12/23  7:47 PM   Result Value Ref Range    Color, UA YELLOW YELLOW    Clarity, UA CLEAR CLEAR    Glucose, Urine 500 (A) NEGATIVE MG/DL    Bilirubin Urine MODERATE NUMBER OR AMOUNT OBSERVED (A) NEGATIVE MG/DL Ketones, Urine >80 (A) NEGATIVE MG/DL    Specific Gravity, UA 1.025 1.001 - 1.035    Blood, Urine LARGE NUMBER OR AMOUNT OBSERVED (A) NEGATIVE    pH, Urine 6.0 5.0 - 8.0    Protein, UA >300 (HH) NEGATIVE MG/DL    Urobilinogen, Urine 1.0 0.2 - 1.0 MG/DL    Nitrite Urine, Quantitative NEGATIVE NEGATIVE    Leukocyte Esterase, Urine NEGATIVE NEGATIVE   Microscopic Urinalysis    Collection Time: 02/12/23  7:47 PM   Result Value Ref Range    RBC, UA 1 0 - 6 /HPF    WBC, UA NONE SEEN 0 - 5 /HPF    Bacteria, UA NEGATIVE NEGATIVE /HPF    Squam Epithel, UA 2 /HPF    Trichomonas, UA NONE SEEN NONE SEEN /HPF    non squam epi cells <1 /HPF   Lactate, Sepsis    Collection Time: 02/13/23 12:46 AM   Result Value Ref Range    Lactic Acid, Sepsis 2.1 (HH) 0.5 - 1.9 mMOL/L   Comprehensive Metabolic Panel w/ Reflex to MG    Collection Time: 02/13/23 12:46 AM   Result Value Ref Range    Sodium 134 (L) 135 - 145 MMOL/L    Potassium 3.8 3.5 - 5.1 MMOL/L    Chloride 94 (L) 99 - 110 mMol/L    CO2 24 21 - 32 MMOL/L    BUN 25 (H) 6 - 23 MG/DL    Creatinine 1.3 (H) 0.6 - 1.1 MG/DL    Est, Glom Filt Rate 48 (L) >60 mL/min/1.73m2    Glucose 306 (H) 70 - 99 MG/DL    Calcium 8.2 (L) 8.3 - 10.6 MG/DL    Albumin 3.6 3.4 - 5.0 GM/DL    Total Protein 6.2 (L) 6.4 - 8.2 GM/DL    Total Bilirubin 1.1 (H) 0.0 - 1.0 MG/DL    ALT 28 10 - 40 U/L     (H) 15 - 37 IU/L    Alkaline Phosphatase 79 40 - 128 IU/L    Anion Gap 16 4 - 16   CK    Collection Time: 02/13/23 12:46 AM   Result Value Ref Range    Total CK 15,448 (H) 26 - 140 IU/L   CBC with Auto Differential    Collection Time: 02/13/23 12:46 AM   Result Value Ref Range    WBC 8.2 4.0 - 10.5 K/CU MM    RBC 4.61 4.2 - 5.4 M/CU MM    Hemoglobin 12.4 (L) 12.5 - 16.0 GM/DL    Hematocrit 40.6 37 - 47 %    MCV 88.1 78 - 100 FL    MCH 26.9 (L) 27 - 31 PG    MCHC 30.5 (L) 32.0 - 36.0 %    RDW 13.7 11.7 - 14.9 %    Platelets 982 (L) 301 - 440 K/CU MM    MPV 10.9 7.5 - 11.1 FL    Differential Type AUTOMATED DIFFERENTIAL     Segs Relative 83.5 (H) 36 - 66 %    Lymphocytes % 6.7 (L) 24 - 44 %    Monocytes % 8.7 (H) 0 - 4 %    Eosinophils % 0.0 0 - 3 %    Basophils % 0.2 0 - 1 %    Segs Absolute 6.8 K/CU MM    Lymphocytes Absolute 0.6 K/CU MM    Monocytes Absolute 0.7 K/CU MM    Eosinophils Absolute 0.0 K/CU MM    Basophils Absolute 0.0 K/CU MM    Nucleated RBC % 0.0 %    Total Nucleated RBC 0.0 K/CU MM    Total Immature Neutrophil 0.07 K/CU MM    Immature Neutrophil % 0.9 (H) 0 - 0.43 %   Lactate, Sepsis    Collection Time: 02/13/23  2:46 AM   Result Value Ref Range    Lactic Acid, Sepsis 2.4 (HH) 0.5 - 1.9 mMOL/L   POCT Glucose    Collection Time: 02/13/23  7:57 AM   Result Value Ref Range    POC Glucose 341 (H) 70 - 99 MG/DL   Phosphorus    Collection Time: 02/13/23  9:20 AM   Result Value Ref Range    Phosphorus 3.2 2.5 - 4.9 MG/DL   TSH    Collection Time: 02/13/23  9:20 AM   Result Value Ref Range    TSH, High Sensitivity 1.140 0.270 - 4.20 uIu/ml   High sensitivity CRP    Collection Time: 02/13/23  9:20 AM   Result Value Ref Range    CRP, High Sensitivity >300.0 (H) 0.0 - 5.0 mg/L   POCT Glucose    Collection Time: 02/13/23 11:37 AM   Result Value Ref Range    POC Glucose 344 (H) 70 - 99 MG/DL   POCT Glucose    Collection Time: 02/13/23  4:42 PM   Result Value Ref Range    POC Glucose 281 (H) 70 - 99 MG/DL           Assessment/Recommendations:     -Patient has MSSA bacteremia we will proceed with JONI tomorrow morning  -HFpEF we will continue present medical therapy         Dania Kirby MD, 2/13/2023 5:07 PM       Please note this report has been partially produced using speech recognition software and may contain errors related to that system including errors in grammar, punctuation, and spelling, as well as words and phrases that may be inappropriate. If there are any questions or concerns please feel free to contact the dictating provider for clarification.

## 2023-02-13 NOTE — CARE COORDINATION
PT has recommended ARU. Referral made to [address] [place] [address] for review only to see if pt meets ARU criteria or not. CM will see pt for d/c planning in the am to discuss the PT recommendation for ARU. OT is signed up to see pt.   TE

## 2023-02-13 NOTE — PROGRESS NOTES
[facility]  consulted by Dr. Shelby Spears for monitoring and adjustment. Indication for treatment: Sepsis:Source unclear  Goal trough: Trough Goal: 15-20 mcg/mL  AUC/ALEJANDRO: 400-600    Risk Factors for MRSA Identified:   None identified    Pertinent Laboratory Values:   Temp Readings from Last 3 Encounters:   02/13/23 (!) 101 °F (38.3 °C) (Oral)     Recent Labs     02/12/23  1640 02/13/23  0046   WBC 8.7 8.2     Recent Labs     02/12/23  1640 02/13/23  0046   BUN 17 25*   CREATININE 0.8 1.3*     Estimated Creatinine Clearance: 67 mL/min (A) (based on SCr of 1.3 mg/dL (H)). Intake/Output Summary (Last 24 hours) at 2/13/2023 0533  Last data filed at 2/12/2023 2213  Gross per 24 hour   Intake 1550 ml   Output --   Net 1550 ml       Pertinent Cultures:   Date    Source    Results  02/12   Blood    Collected  02/12   Rapid Influenza, A/B  Negative  02/12   COVID-19, Rapid  Not detected  02/12   Urine    In process  02/13   MRSA Screen (Nasal)  To be collected    Assessment:  HPI: 62 y.o. female with history of HTN, DM-2, chronic diastolic congestive heart failure, pulmonary hypertension, obesity hypoventilation syndrome, and morbid obesity. Patient presents to ED with severe sepsis without septic shock. At presentation patient was noted to have /91, , RR 22, temp 100.2, saturating 98% on room air. Later patient was noted to be hypotensive, BP down to 86/67. SCr = 1.3, BUN = 25, and no output data  BUN and SCr trended upward from initial lab draw on admission: Continue to monitor  Day(s) of therapy: 1 of 7  Vancomycin concentration:   02/14 - To be collected    Plan:  Vancomycin 2,000 mg IV given in ED; Follow with Vancomycin 1,500 mg IV Q 24 Hours  Predicted AUC: 589; Predicted Trough: 15.2  Pharmacy will continue to monitor patient and adjust therapy as indicated    Barron 3 02/14/2023 @ 6 AM    Thank you for the consult.   Ayden Mayberry [facility] [place] [facility]  2/13/2023 5:33 AM

## 2023-02-13 NOTE — CARE COORDINATION
Discussed pt is IDR. Pt is very weak from lying on floor for extended period of time per nurse. PT/OT ordered & requested via WB.   TE

## 2023-02-13 NOTE — PROGRESS NOTES
V2.0  [facility] Hospitalist Progress Note      Name:  Norma Velásquez /Age/Sex: 1965  (62 y.o. female)   MRN & CSN:  [id number] & [id number] Encounter Date/Time: 2023 11:42 AM EST    Location:  [facility]-A PCP: No primary care provider on file. Hospital Day: 2    Assessment and Plan:   Norma Velásquez is a 62 y.o. female with pmh of hypertension, diabetes mellitus, history of diastolic heart failure, SAAD, COPD, obesity hypoventilation syndrome and chronic respiratory failure on 2 L of O2 who presents with history of found on the floor with Severe sepsis without septic shock ([address] and rhabdomyolysis. # Rhabdomyolysis suspected traumatic: Per patient she fell out of the bed and stayed on the floor for whole night, CPK 9595--> 15,448, continue IV fluids cautiously as patient's chest x-ray showed possible pulmonary edema and with history of heart failure did not know EF, consulted nephrology as patient developed RAMON. # Severe sepsis unknown source with staph Bacteremia: Presented with SIRS 3/4 with fever, tachycardia, tachypnea with normal WBC but lactic acid 2.9 give only 2 L of bolus as patient might be heart failure, continued IV fluids cautiously, obtain  pan cultures and started on Zosyn and vancomycin, blood cultures which grew gram-positive cocci, Staph aureus at 24 hours, urine cultures pending, chest x-ray showed possible pulmonary edema, CT chest/abdomen/pelvis no acute process, respiratory panel and COVID-negative, procal pending, continue Zosyn and vancomycin, obtain echocardiogram, and consult ID. # RAMON secondary to rhabdo: UA showed large blood, protein greater than 300, glucose greater than 500, CT abdomen/pelvis no acute process, continue IV fluids cautiously, consulted nephrology follow recommendations. # Lactic acidosis: Suspect due to above trend, Labs not drawn even after calling lab.     # Fall from bed: CT head, CT C-spine no acute process, patient lives alone at home and she is on disability, PT/OT evaluation when appropriate. # Hypovolemic hyponatremia suspected secondary to dehydration after IV fluids improved, sodium 134, corrected 137. Continue to monitor. # Hypertension: On Lasix 40 mg daily, Coreg 6.25 mg daily hold    # Type 2 diabetes mellitus on metformin 1 g twice daily, glipizide 10 mg daily, hold oral diabetic's, start on sliding scale insulin, hypoglycemic protocol, diabetic diet and obtain A1c    #History of chronic diastolic heart failure, patient was on Lasix will hold and obtain echocardiogram    #History of pulmonary hypertension  #Obesity hypoventilation syndrome  #Sleep apnea  #Morbid obesity  #Pulmonary hypertension  #Chronic respiratory failure on 2 L of O2    Diet ADULT DIET; Clear Liquid; 4 carb choices (60 gm/meal)   DVT Prophylaxis [] Lovenox, [x]  Heparin, [] SCDs, [] Ambulation,  [] Eliquis, [] Xarelto  [] Coumadin   Code Status Full Code   Disposition From: Home  Expected Disposition: Home versus SNF  Estimated Date of Discharge: TBD  Patient requires continued admission due to sepsis/rhabdomyolysis, RAMON   Surrogate Decision Maker/ POA Daughter     Subjective:     Chief Complaint: Fall (\"Slide out of bed. \" Pt found down by family) and Emesis (Since this morning)       Patient seen and examined at bedside. Patient denies any complaints, denies any fever, chills, nausea, vomiting, chest pain, shortness of breath, abdominal pain, diarrhea, constipation, urinary symptoms. Review of Systems:    Review of Systems    As above    Objective:      Intake/Output Summary (Last 24 hours) at 2/13/2023 1142  Last data filed at 2/12/2023 2213  Gross per 24 hour   Intake 1550 ml   Output --   Net 1550 ml        Vitals:   Vitals:    02/13/23 1000   BP: 114/81   Pulse: (!) 101   Resp: (!) 37   Temp:    SpO2: 96%       Physical Exam:     General: Febrile Tmax 102.9, on 2 L of O2  Eyes: EOMI, mucous membrane pink/dry  ENT: neck supple, no JVD  Cardiovascular: Regular rate.,  Tachycardic  Respiratory: Clear to auscultation no wheezing no crackles  Gastrointestinal: Soft, non tender bowel sounds normal  Genitourinary: no suprapubic tenderness  Musculoskeletal: No edema  Skin: warm, dry  Neuro: Alert. Oriented no focal deficit  Psych: Mood appropriate.      Medications:   Medications:    insulin glargine  5 Units SubCUTAneous Daily with breakfast    vancomycin  1,500 mg IntraVENous Q24H    piperacillin-tazobactam  3,375 mg IntraVENous q8h    sodium chloride flush  5-40 mL IntraVENous 2 times per day    enoxaparin  30 mg SubCUTAneous BID    insulin lispro  0-8 Units SubCUTAneous TID WC    insulin lispro  0-4 Units SubCUTAneous Nightly      Infusions:    sodium chloride      sodium chloride 150 mL/hr at 02/13/23 0949    dextrose       PRN Meds: ondansetron, 4 mg, Q6H PRN  sodium chloride flush, 5-40 mL, PRN  sodium chloride, , PRN  acetaminophen, 650 mg, Q6H PRN   Or  acetaminophen, 650 mg, Q6H PRN  glucose, 4 tablet, PRN  dextrose bolus, 125 mL, PRN   Or  dextrose bolus, 250 mL, PRN  glucagon (rDNA), 1 mg, PRN  dextrose, , Continuous PRN        Labs      Recent Results (from the past 24 hour(s))   CBC with Auto Differential    Collection Time: 02/12/23  4:40 PM   Result Value Ref Range    WBC 8.7 4.0 - 10.5 K/CU MM    RBC 4.89 4.2 - 5.4 M/CU MM    Hemoglobin 12.9 12.5 - 16.0 GM/DL    Hematocrit 42.3 37 - 47 %    MCV 86.5 78 - 100 FL    MCH 26.4 (L) 27 - 31 PG    MCHC 30.5 (L) 32.0 - 36.0 %    RDW 13.4 11.7 - 14.9 %    Platelets 367 (L) 284 - 440 K/CU MM    MPV 11.3 (H) 7.5 - 11.1 FL    Differential Type AUTOMATED DIFFERENTIAL     Segs Relative 87.3 (H) 36 - 66 %    Lymphocytes % 5.8 (L) 24 - 44 %    Monocytes % 6.0 (H) 0 - 4 %    Eosinophils % 0.0 0 - 3 %    Basophils % 0.3 0 - 1 %    Segs Absolute 7.6 K/CU MM    Lymphocytes Absolute 0.5 K/CU MM    Monocytes Absolute 0.5 K/CU MM    Eosinophils Absolute 0.0 K/CU MM    Basophils Absolute 0.0 K/CU MM    Nucleated RBC % 0.0 % Total Nucleated RBC 0.0 K/CU MM    Total Immature Neutrophil 0.05 K/CU MM    Immature Neutrophil % 0.6 (H) 0 - 0.43 %   Comprehensive Metabolic Panel    Collection Time: 02/12/23  4:40 PM   Result Value Ref Range    Sodium 129 (L) 135 - 145 MMOL/L    Potassium 4.0 3.5 - 5.1 MMOL/L    Chloride 90 (L) 99 - 110 mMol/L    CO2 24 21 - 32 MMOL/L    BUN 17 6 - 23 MG/DL    Creatinine 0.8 0.6 - 1.1 MG/DL    Est, Glom Filt Rate >60 >60 mL/min/1.73m2    Glucose 339 (H) 70 - 99 MG/DL    Calcium 9.0 8.3 - 10.6 MG/DL    Albumin 3.8 3.4 - 5.0 GM/DL    Total Protein 7.5 6.4 - 8.2 GM/DL    Total Bilirubin 1.2 (H) 0.0 - 1.0 MG/DL    ALT 21 10 - 40 U/L     (H) 15 - 37 IU/L    Alkaline Phosphatase 89 40 - 129 IU/L    Anion Gap 15 4 - 16   Lipase    Collection Time: 02/12/23  4:40 PM   Result Value Ref Range    Lipase 8 (L) 13 - 60 IU/L   Culture, Blood 1    Collection Time: 02/12/23  4:40 PM    Specimen: Blood   Result Value Ref Range    Specimen BLOOD     Special Requests       2 BOTTLES OUT OF 4 DRAWN ARE POSITIVE FOR STAPH AUREUS BY PCR. CALLED TO N3 NURSE HWANG AT 0712 BY POLA TERAN. ARVIN. SENT TO PHARMACY    Culture STAPHYLOCOCCUS AUREUS (A)    Lactate, Sepsis    Collection Time: 02/12/23  4:40 PM   Result Value Ref Range    Lactic Acid, Sepsis 2.9 (HH) 0.5 - 1.9 mMOL/L   CK    Collection Time: 02/12/23  4:40 PM   Result Value Ref Range    Total CK 9,595 (H) 26 - 140 IU/L   Culture, Blood 2    Collection Time: 02/12/23  4:45 PM    Specimen: Blood   Result Value Ref Range    Specimen BLOOD     Special Requests 3 BOTTLES OUT OF 4 DRAWN ARE POSITIVE     Culture GRAM POSITIVE COCCI    Rapid Flu Swab    Collection Time: 02/12/23  7:25 PM    Specimen: Nasopharyngeal   Result Value Ref Range    Rapid Influenza A Ag NEGATIVE NEGATIVE    Rapid Influenza B Ag NEGATIVE NEGATIVE   COVID-19, Rapid    Collection Time: 02/12/23  7:25 PM    Specimen: Nasopharyngeal   Result Value Ref Range    Source NARES     SARS-CoV-2, NAAT NOT DETECTED NOT DETECTED   Urinalysis    Collection Time: 02/12/23  7:47 PM   Result Value Ref Range    Color, UA YELLOW YELLOW    Clarity, UA CLEAR CLEAR    Glucose, Urine 500 (A) NEGATIVE MG/DL    Bilirubin Urine MODERATE NUMBER OR AMOUNT OBSERVED (A) NEGATIVE MG/DL    Ketones, Urine >80 (A) NEGATIVE MG/DL    Specific Gravity, UA 1.025 1.001 - 1.035    Blood, Urine LARGE NUMBER OR AMOUNT OBSERVED (A) NEGATIVE    pH, Urine 6.0 5.0 - 8.0    Protein, UA >300 (HH) NEGATIVE MG/DL    Urobilinogen, Urine 1.0 0.2 - 1.0 MG/DL    Nitrite Urine, Quantitative NEGATIVE NEGATIVE    Leukocyte Esterase, Urine NEGATIVE NEGATIVE   Microscopic Urinalysis    Collection Time: 02/12/23  7:47 PM   Result Value Ref Range    RBC, UA 1 0 - 6 /HPF    WBC, UA NONE SEEN 0 - 5 /HPF    Bacteria, UA NEGATIVE NEGATIVE /HPF    Squam Epithel, UA 2 /HPF    Trichomonas, UA NONE SEEN NONE SEEN /HPF    non squam epi cells <1 /HPF   Lactate, Sepsis    Collection Time: 02/13/23 12:46 AM   Result Value Ref Range    Lactic Acid, Sepsis 2.1 (HH) 0.5 - 1.9 mMOL/L   Comprehensive Metabolic Panel w/ Reflex to MG    Collection Time: 02/13/23 12:46 AM   Result Value Ref Range    Sodium 134 (L) 135 - 145 MMOL/L    Potassium 3.8 3.5 - 5.1 MMOL/L    Chloride 94 (L) 99 - 110 mMol/L    CO2 24 21 - 32 MMOL/L    BUN 25 (H) 6 - 23 MG/DL    Creatinine 1.3 (H) 0.6 - 1.1 MG/DL    Est, Glom Filt Rate 48 (L) >60 mL/min/1.73m2    Glucose 306 (H) 70 - 99 MG/DL    Calcium 8.2 (L) 8.3 - 10.6 MG/DL    Albumin 3.6 3.4 - 5.0 GM/DL    Total Protein 6.2 (L) 6.4 - 8.2 GM/DL    Total Bilirubin 1.1 (H) 0.0 - 1.0 MG/DL    ALT 28 10 - 40 U/L     (H) 15 - 37 IU/L    Alkaline Phosphatase 79 40 - 128 IU/L    Anion Gap 16 4 - 16   CK    Collection Time: 02/13/23 12:46 AM   Result Value Ref Range    Total CK 15,448 (H) 26 - 140 IU/L   CBC with Auto Differential    Collection Time: 02/13/23 12:46 AM   Result Value Ref Range    WBC 8.2 4.0 - 10.5 K/CU MM    RBC 4.61 4.2 - 5.4 M/CU MM Hemoglobin 12.4 (L) 12.5 - 16.0 GM/DL    Hematocrit 40.6 37 - 47 %    MCV 88.1 78 - 100 FL    MCH 26.9 (L) 27 - 31 PG    MCHC 30.5 (L) 32.0 - 36.0 %    RDW 13.7 11.7 - 14.9 %    Platelets 600 (L) 385 - 440 K/CU MM    MPV 10.9 7.5 - 11.1 FL    Differential Type AUTOMATED DIFFERENTIAL     Segs Relative 83.5 (H) 36 - 66 %    Lymphocytes % 6.7 (L) 24 - 44 %    Monocytes % 8.7 (H) 0 - 4 %    Eosinophils % 0.0 0 - 3 %    Basophils % 0.2 0 - 1 %    Segs Absolute 6.8 K/CU MM    Lymphocytes Absolute 0.6 K/CU MM    Monocytes Absolute 0.7 K/CU MM    Eosinophils Absolute 0.0 K/CU MM    Basophils Absolute 0.0 K/CU MM    Nucleated RBC % 0.0 %    Total Nucleated RBC 0.0 K/CU MM    Total Immature Neutrophil 0.07 K/CU MM    Immature Neutrophil % 0.9 (H) 0 - 0.43 %   Lactate, Sepsis    Collection Time: 02/13/23  2:46 AM   Result Value Ref Range    Lactic Acid, Sepsis 2.4 (HH) 0.5 - 1.9 mMOL/L   POCT Glucose    Collection Time: 02/13/23  7:57 AM   Result Value Ref Range    POC Glucose 341 (H) 70 - 99 MG/DL   Phosphorus    Collection Time: 02/13/23  9:20 AM   Result Value Ref Range    Phosphorus 3.2 2.5 - 4.9 MG/DL   TSH    Collection Time: 02/13/23  9:20 AM   Result Value Ref Range    TSH, High Sensitivity 1.140 0.270 - 4.20 uIu/ml        Imaging/Diagnostics Last 24 Hours   CT HEAD WO CONTRAST    Result Date: 2/12/2023  EXAMINATION: CT OF THE HEAD WITHOUT CONTRAST  2/12/2023 4:25 pm TECHNIQUE: CT of the head was performed without the administration of intravenous contrast. Automated exposure control, iterative reconstruction, and/or weight based adjustment of the mA/kV was utilized to reduce the radiation dose to as low as reasonably achievable. COMPARISON: None. HISTORY: ORDERING SYSTEM PROVIDED HISTORY: Fall, hit head TECHNOLOGIST PROVIDED HISTORY: Has a \"code stroke\" or \"stroke alert\" been called? ->No Reason for exam:->Fall, hit head Decision Support Exception - unselect if not a suspected or confirmed emergency medical condition->Emergency Medical Condition (MA) Reason for Exam: Fall, hit head FINDINGS: BRAIN/VENTRICLES: There is no acute intracranial hemorrhage, mass effect or midline shift. No abnormal extra-axial fluid collection. The gray-white differentiation is maintained without evidence of an acute infarct. There is no evidence of hydrocephalus. ORBITS: The visualized portion of the orbits demonstrate no acute abnormality. SINUSES: The visualized paranasal sinuses and mastoid air cells demonstrate no acute abnormality. SOFT TISSUES/SKULL:  No acute abnormality of the visualized skull or soft tissues. No acute intracranial abnormality. CT CERVICAL SPINE WO CONTRAST    Result Date: 2/12/2023  EXAMINATION: CT OF THE CERVICAL SPINE WITHOUT CONTRAST 2/12/2023 4:25 pm TECHNIQUE: CT of the cervical spine was performed without the administration of intravenous contrast. Multiplanar reformatted images are provided for review. Automated exposure control, iterative reconstruction, and/or weight based adjustment of the mA/kV was utilized to reduce the radiation dose to as low as reasonably achievable. COMPARISON: None. HISTORY: ORDERING SYSTEM PROVIDED HISTORY: Fall from bed TECHNOLOGIST PROVIDED HISTORY: Reason for exam:->Fall from bed Decision Support Exception - unselect if not a suspected or confirmed emergency medical condition->Emergency Medical Condition (MA) Reason for Exam: FALL, NECK PAIN FINDINGS: BONES/ALIGNMENT: There is no acute fracture or traumatic malalignment. DEGENERATIVE CHANGES: Calcification of the posterior longitudinal ligament at C3-C4, which causes eccentric narrowing of the spinal canal. SOFT TISSUES: There is no prevertebral soft tissue swelling. No acute abnormality of the cervical spine.      XR CHEST PORTABLE    Result Date: 2/12/2023  EXAMINATION: ONE XRAY VIEW OF THE CHEST 2/12/2023 5:54 pm COMPARISON: Chest x-ray September 19, 2008 HISTORY: ORDERING SYSTEM PROVIDED HISTORY: Fall TECHNOLOGIST PROVIDED HISTORY: Reason for exam:->Fall Reason for Exam: fall FINDINGS: Cardiac silhouette is enlarged but grossly stable given changes in technique when compared with prior exam.  Central pulmonary vascular congestion and mild interstitial opacities favored to reflect changes related to pulmonary edema. No large effusion. No pneumothorax. 1. Cardiomegaly and findings favored to reflect pulmonary edema. CT CHEST ABDOMEN PELVIS W CONTRAST Additional Contrast? None    Result Date: 2/13/2023  EXAMINATION: CT OF THE CHEST, ABDOMEN, AND PELVIS WITH CONTRAST 2/13/2023 2:36 am TECHNIQUE: CT of the chest, abdomen and pelvis was performed with the administration of intravenous contrast. Multiplanar reformatted images are provided for review. Automated exposure control, iterative reconstruction, and/or weight based adjustment of the mA/kV was utilized to reduce the radiation dose to as low as reasonably achievable. COMPARISON: None HISTORY: ORDERING SYSTEM PROVIDED HISTORY: Shortness of breath, sepsis TECHNOLOGIST PROVIDED HISTORY: Reason for exam:->Shortness of breath, sepsis Additional Contrast?->None Decision Support Exception - unselect if not a suspected or confirmed emergency medical condition->Emergency Medical Condition (MA) Reason for Exam: Shortness of breath, sepsis, emesis FINDINGS: Chest: Mediastinum: Cardiac chambers are not enlarged and no significant pericardial effusion. Mediastinal lymph nodes are not enlarged. Thoracic aorta is not aneurysmal.  Bolus timing does not allow for evaluation of the pulmonary arterial system. Lungs/pleura: Linear bands of atelectasis/scarring in the right lower lobe and some calcified granulomas. No focal consolidation or diffuse opacities are seen to indicate pneumonia. Noncalcified 3 mm nodule in the left upper lobe on series 304 image number 66 there is no pleural effusion or pneumothorax.  Soft Tissues/Bones: No acute or displaced fractures are seen at the ribs. There is no soft tissue emphysema in the chest wall.  Degenerative changes anterior longitudinal ligament ossification in the mid to lower thoracic spine.  No acute fracture or subluxation is demonstrated at the spine. Abdomen/Pelvis: Organs: Liver is enlarged and a small portion is cut off along the right lateral aspect.  Otherwise the liver surface is smooth.  The biliary system is not dilated.  Multiple calcified gallstones are present in the gallbladder without surrounding fat stranding at the gallbladder fossa.  The spleen is not enlarged.  There is fatty atrophy of the pancreas with limited amounts of parenchyma remaining.  Adrenal glands are unremarkable.  Mild perinephric stranding is symmetric and may relate to chronic renal disease.  There is no acute enhancement abnormality, hydronephrosis, or hydroureter. GI/Bowel: Small portion of the ascending colon is cut off along the right lateral aspect of the abdomen.  There is constipation in the distal colon and rectum.  Scattered diverticulosis but without signs of acute diverticulitis. Small bowel and colon are not dilated.  Some gastritis is suspected at the stomach.  There is no ascites or pneumoperitoneum. Pelvis: The urinary bladder wall is not significantly thickened.  The uterus is not enlarged.  No free fluid in the pelvis. Peritoneum/Retroperitoneum: No abdominal aortic aneurysm, retroperitoneal hematoma, or bulky adenopathy. Bones/Soft Tissues: The right lateral abdominal wall is cut off the exam. Degenerative changes along the spine without acute fracture deformity or subluxation.  Also has degenerative changes in the sacroiliac joints and hips with joint space narrowing and marginal sclerosis.     1.  Linear bands of atelectasis/scarring in the right lower chest and some scattered calcified granulomas without signs of pneumonia. 2.  A noncalcified 3 mm nodule in the left upper lobe would not require further workup in a low risk patient.  3.  The liver is enlarged and portion of the right lateral abdomen including the edge of the liver is cut off the edge of the exam.  No apparent mass or nodularity within the liver. 4.  There is cholelithiasis but without fat stranding around the gallbladder fossa. 5.  No bowel obstruction, pneumoperitoneum, or sign of diverticulitis. Some gastritis in the stomach is suspected. 6.  Some stranding around both kidneys appear symmetric and may relate to chronic renal disease. There is no hydronephrosis or hydroureter. 7.  Fatty atrophy of the pancreas.        Electronically signed by Sofía Drummond MD on 2/13/2023 at 11:42 AM

## 2023-02-14 LAB
ALBUMIN SERPL-MCNC: 3.1 GM/DL (ref 3.4–5)
ALP BLD-CCNC: 69 IU/L (ref 40–128)
ALT SERPL-CCNC: 30 U/L (ref 10–40)
ANION GAP SERPL CALCULATED.3IONS-SCNC: 13 MMOL/L (ref 4–16)
AST SERPL-CCNC: 215 IU/L (ref 15–37)
BASOPHILS ABSOLUTE: 0 K/CU MM
BASOPHILS RELATIVE PERCENT: 0.3 % (ref 0–1)
BILIRUB SERPL-MCNC: 0.6 MG/DL (ref 0–1)
BUN SERPL-MCNC: 30 MG/DL (ref 6–23)
CALCIUM SERPL-MCNC: 7.4 MG/DL (ref 8.3–10.6)
CHLORIDE BLD-SCNC: 94 MMOL/L (ref 99–110)
CO2: 22 MMOL/L (ref 21–32)
CREAT SERPL-MCNC: 2 MG/DL (ref 0.6–1.1)
CRP SERPL HS-MCNC: 246.8 MG/L
CULTURE: NORMAL
DIFFERENTIAL TYPE: ABNORMAL
DOSE AMOUNT: NORMAL
DOSE TIME: NORMAL
EOSINOPHILS ABSOLUTE: 0 K/CU MM
EOSINOPHILS RELATIVE PERCENT: 0 % (ref 0–3)
ESTIMATED AVERAGE GLUCOSE: 226 MG/DL
GFR SERPL CREATININE-BSD FRML MDRD: 29 ML/MIN/1.73M2
GLUCOSE BLD-MCNC: 237 MG/DL (ref 70–99)
GLUCOSE BLD-MCNC: 304 MG/DL (ref 70–99)
GLUCOSE BLD-MCNC: 346 MG/DL (ref 70–99)
GLUCOSE SERPL-MCNC: 270 MG/DL (ref 70–99)
HBA1C MFR BLD: 9.5 % (ref 4.2–6.3)
HCT VFR BLD CALC: 33.5 % (ref 37–47)
HEMOGLOBIN: 10.7 GM/DL (ref 12.5–16)
IMMATURE NEUTROPHIL %: 1 % (ref 0–0.43)
LACTATE: 1.3 MMOL/L (ref 0.5–1.9)
LACTATE: 1.6 MMOL/L (ref 0.5–1.9)
LYMPHOCYTES ABSOLUTE: 0.5 K/CU MM
LYMPHOCYTES RELATIVE PERCENT: 8.9 % (ref 24–44)
Lab: NORMAL
MAGNESIUM: 1.8 MG/DL (ref 1.8–2.4)
MCH RBC QN AUTO: 27.3 PG (ref 27–31)
MCHC RBC AUTO-ENTMCNC: 31.9 % (ref 32–36)
MCV RBC AUTO: 85.5 FL (ref 78–100)
MONOCYTES ABSOLUTE: 0.5 K/CU MM
MONOCYTES RELATIVE PERCENT: 7.9 % (ref 0–4)
NUCLEATED RBC %: 0 %
PDW BLD-RTO: 13.7 % (ref 11.7–14.9)
PLATELET # BLD: 140 K/CU MM (ref 140–440)
POTASSIUM SERPL-SCNC: 3.6 MMOL/L (ref 3.5–5.1)
PRO-BNP: 1415 PG/ML
PROCALCITONIN SERPL-MCNC: 12.96 NG/ML
RBC # BLD: 3.92 M/CU MM (ref 4.2–5.4)
SEGMENTED NEUTROPHILS ABSOLUTE COUNT: 4.8 K/CU MM
SEGMENTED NEUTROPHILS RELATIVE PERCENT: 81.9 % (ref 36–66)
SODIUM BLD-SCNC: 129 MMOL/L (ref 135–145)
SPECIMEN: NORMAL
TOTAL CK: ABNORMAL IU/L (ref 26–140)
TOTAL IMMATURE NEUTOROPHIL: 0.06 K/CU MM
TOTAL NUCLEATED RBC: 0 K/CU MM
TOTAL PROTEIN: 5.3 GM/DL (ref 6.4–8.2)
VANCOMYCIN RANDOM: 19.6 UG/ML
WBC # BLD: 5.9 K/CU MM (ref 4–10.5)

## 2023-02-14 PROCEDURE — 7100000001 HC PACU RECOVERY - ADDTL 15 MIN

## 2023-02-14 PROCEDURE — 99232 SBSQ HOSP IP/OBS MODERATE 35: CPT | Performed by: INTERNAL MEDICINE

## 2023-02-14 PROCEDURE — 83036 HEMOGLOBIN GLYCOSYLATED A1C: CPT

## 2023-02-14 PROCEDURE — 36415 COLL VENOUS BLD VENIPUNCTURE: CPT

## 2023-02-14 PROCEDURE — 97530 THERAPEUTIC ACTIVITIES: CPT

## 2023-02-14 PROCEDURE — 80202 ASSAY OF VANCOMYCIN: CPT

## 2023-02-14 PROCEDURE — 86140 C-REACTIVE PROTEIN: CPT

## 2023-02-14 PROCEDURE — 85025 COMPLETE CBC W/AUTO DIFF WBC: CPT

## 2023-02-14 PROCEDURE — 2500000003 HC RX 250 WO HCPCS: Performed by: INTERNAL MEDICINE

## 2023-02-14 PROCEDURE — 2140000000 HC CCU INTERMEDIATE R&B

## 2023-02-14 PROCEDURE — 83880 ASSAY OF NATRIURETIC PEPTIDE: CPT

## 2023-02-14 PROCEDURE — 6370000000 HC RX 637 (ALT 250 FOR IP): Performed by: INTERNAL MEDICINE

## 2023-02-14 PROCEDURE — 6370000000 HC RX 637 (ALT 250 FOR IP): Performed by: STUDENT IN AN ORGANIZED HEALTH CARE EDUCATION/TRAINING PROGRAM

## 2023-02-14 PROCEDURE — 93325 DOPPLER ECHO COLOR FLOW MAPG: CPT | Performed by: INTERNAL MEDICINE

## 2023-02-14 PROCEDURE — 99232 SBSQ HOSP IP/OBS MODERATE 35: CPT | Performed by: NURSE PRACTITIONER

## 2023-02-14 PROCEDURE — 7100000000 HC PACU RECOVERY - FIRST 15 MIN

## 2023-02-14 PROCEDURE — 94761 N-INVAS EAR/PLS OXIMETRY MLT: CPT

## 2023-02-14 PROCEDURE — 97535 SELF CARE MNGMENT TRAINING: CPT

## 2023-02-14 PROCEDURE — 6360000002 HC RX W HCPCS: Performed by: STUDENT IN AN ORGANIZED HEALTH CARE EDUCATION/TRAINING PROGRAM

## 2023-02-14 PROCEDURE — 87040 BLOOD CULTURE FOR BACTERIA: CPT

## 2023-02-14 PROCEDURE — 87081 CULTURE SCREEN ONLY: CPT

## 2023-02-14 PROCEDURE — 84145 PROCALCITONIN (PCT): CPT

## 2023-02-14 PROCEDURE — 82550 ASSAY OF CK (CPK): CPT

## 2023-02-14 PROCEDURE — 2580000003 HC RX 258: Performed by: INTERNAL MEDICINE

## 2023-02-14 PROCEDURE — 83735 ASSAY OF MAGNESIUM: CPT

## 2023-02-14 PROCEDURE — 2700000000 HC OXYGEN THERAPY PER DAY

## 2023-02-14 PROCEDURE — 82962 GLUCOSE BLOOD TEST: CPT

## 2023-02-14 PROCEDURE — 93312 ECHO TRANSESOPHAGEAL: CPT

## 2023-02-14 PROCEDURE — 93312 ECHO TRANSESOPHAGEAL: CPT | Performed by: INTERNAL MEDICINE

## 2023-02-14 PROCEDURE — 97116 GAIT TRAINING THERAPY: CPT

## 2023-02-14 PROCEDURE — 80053 COMPREHEN METABOLIC PANEL: CPT

## 2023-02-14 PROCEDURE — 83605 ASSAY OF LACTIC ACID: CPT

## 2023-02-14 RX ORDER — FUROSEMIDE 40 MG/1
40 TABLET ORAL DAILY
Status: DISCONTINUED | OUTPATIENT
Start: 2023-02-14 | End: 2023-02-17 | Stop reason: HOSPADM

## 2023-02-14 RX ORDER — CARVEDILOL 6.25 MG/1
6.25 TABLET ORAL 2 TIMES DAILY WITH MEALS
Status: DISCONTINUED | OUTPATIENT
Start: 2023-02-14 | End: 2023-02-17 | Stop reason: HOSPADM

## 2023-02-14 RX ADMIN — CARVEDILOL 6.25 MG: 6.25 TABLET, FILM COATED ORAL at 18:17

## 2023-02-14 RX ADMIN — FUROSEMIDE 40 MG: 40 TABLET ORAL at 13:15

## 2023-02-14 RX ADMIN — SODIUM CHLORIDE, PRESERVATIVE FREE 10 ML: 5 INJECTION INTRAVENOUS at 21:04

## 2023-02-14 RX ADMIN — INSULIN LISPRO 6 UNITS: 100 INJECTION, SOLUTION INTRAVENOUS; SUBCUTANEOUS at 18:15

## 2023-02-14 RX ADMIN — HEPARIN SODIUM 5000 UNITS: 5000 INJECTION INTRAVENOUS; SUBCUTANEOUS at 21:03

## 2023-02-14 RX ADMIN — SODIUM BICARBONATE: 84 INJECTION, SOLUTION INTRAVENOUS at 06:51

## 2023-02-14 RX ADMIN — SODIUM BICARBONATE: 84 INJECTION, SOLUTION INTRAVENOUS at 23:08

## 2023-02-14 RX ADMIN — HEPARIN SODIUM 5000 UNITS: 5000 INJECTION INTRAVENOUS; SUBCUTANEOUS at 14:42

## 2023-02-14 RX ADMIN — SODIUM CHLORIDE: 9 INJECTION, SOLUTION INTRAVENOUS at 05:03

## 2023-02-14 RX ADMIN — INSULIN LISPRO 2 UNITS: 100 INJECTION, SOLUTION INTRAVENOUS; SUBCUTANEOUS at 14:43

## 2023-02-14 RX ADMIN — HEPARIN SODIUM 5000 UNITS: 5000 INJECTION INTRAVENOUS; SUBCUTANEOUS at 05:03

## 2023-02-14 RX ADMIN — INSULIN LISPRO 4 UNITS: 100 INJECTION, SOLUTION INTRAVENOUS; SUBCUTANEOUS at 21:05

## 2023-02-14 NOTE — CONSULTS
Nephrology Service Consultation      [address], [address] [address]  Phone: [phone]  Office Hours: 8:30AM - 4:30PM  Monday - Friday        MEDICAL DECISION MAKING and Recommendations   -RAMON from volume depletion and rhabdo; cr 1.3 from normal baseline//could already be in ATN//no HN on CT AP  -Hyponatremia  -Staph bacteremia  -Proteinuria on UA; recheck once RAMON resolves  -Transaminitis  -Rhabdo s/p being found on the floor   -CHF hx  -DM2    Suggest:  -Continue IVF  -Obtain phos level and TSH as rhabdo workup  -Avoid nephrotoxins  -Daily BMP  -Labs may get worse being getting better as she may already be in ATN  -Hold lasix for now    Thank you    Patient Active Problem List    Diagnosis Date Noted    Accidental fall from bed 2023    Severe sepsis without septic shock ([facility] [address] 2023         Patient:  Donal Boucher  MRN: [id number]  Consulting physician:  John Silvestre MD  Reason for Consult: CR 1.3  PCP: No primary care provider on file. HISTORY OF PRESENT ILLNESS:   The patient is a 62 y.o. female with CAD, diastolic CHF, presented after being found donw by family, yesterday afternoon. They had been trying to reach her for many hrs and ended up calling the police when she did not  for a while. Renal consult for cr 1.3 and [place] [address]  She was found to have staph bacteremia too  She denies NSAID use, diarrhea, vomiting  She is on NC, alert and awake and her sister and brother in law are at bedside  Hemodynamically stable      REVIEW OF SYSTEMS:  14 point ROS is Negative. See positive ROS per HPI    Past Medical History:    CAD, CHF, DM2, COPD on home oxygen, HTN    Past Surgical History:    , right foot cyst removal    Medications:   Prior to Admission medications    Medication Sig Start Date End Date Taking?  Authorizing Provider   carvedilol (COREG) 6.25 MG tablet Take 6.25 mg by mouth 2 times daily (with meals)   Yes Historical Provider, MD furosemide (LASIX) 40 MG tablet Take 40 mg by mouth daily   Yes Historical Provider, MD   glipiZIDE (GLUCOTROL XL) 10 MG extended release tablet Take 10 mg by mouth daily   Yes Historical Provider, MD   metFORMIN (GLUCOPHAGE) 1000 MG tablet Take 1,000 mg by mouth 2 times daily (with meals)   Yes Historical Provider, MD        Allergies:  Patient has no known allergies. Social History:   TOBACCO:   reports that she has never smoked. She has never used smokeless tobacco.  ETOH:   reports that she does not currently use alcohol. OCCUPATION:      Family History:   History reviewed. No pertinent family history.   Physical Exam:    Vitals: BP (!) 142/45   Pulse (!) 104   Temp 98.4 °F (36.9 °C) (Oral)   Resp 27   Ht 5' 5\" (1.651 m)   Wt (!) 305 lb (138.3 kg)   SpO2 98%   BMI 50.75 kg/m²   General appearance: in no acute distress, appears stated age  Skin: Skin color, texture, turgor normal. No rashes or lesions  HEENT: normocephalic, atraumatic  Neck: supple, trachea midline  Lungs: breathing comfortably ON NC  Heart[de-identified] regular rate and rhythm, S1, S2 normal,  Abdomen: soft, non-tender; bowel sounds normal; no masses,   Extremities: extremities normal, atraumatic, no cyanosis or edema  Neurologic: Mental status: alert, oriented, interactive, following commands  Psychiatric: mood and affect appropriate     CBC:   Recent Labs     02/12/23  1640 02/13/23 0046   WBC 8.7 8.2   HGB 12.9 12.4*   * 113*     BMP:    Recent Labs     02/12/23  1640 02/13/23  0046   * 134*   K 4.0 3.8   CL 90* 94*   CO2 24 24   BUN 17 25*   CREATININE 0.8 1.3*   GLUCOSE 339* 306*     Hepatic:   Recent Labs     02/12/23  1640 02/13/23 0046   * 224*   ALT 21 28   BILITOT 1.2* 1.1*   ALKPHOS 89 79              Electronically signed by Abhay Muniz DO on 2/13/2023 at 7:27 PM    [facility]  MD Kalpesh Ozuna DO  [address] [address],  [place] [address]  PHONE: : [phone]

## 2023-02-14 NOTE — PROGRESS NOTES
Nephrology Progress Note        [address], [address] [address]  Phone: [phone]  Office Hours: 8:30AM - 4:30PM  Monday - Friday 2/14/2023 7:21 AM  Subjective:   Admit Date: 2/12/2023  PCP: No primary care provider on file. Interval History:     Diet: Diet NPO      Data:   Scheduled Meds:   furosemide  40 mg Oral Daily    insulin glargine  5 Units SubCUTAneous Daily with breakfast    vancomycin  1,500 mg IntraVENous Q24H    heparin (porcine)  5,000 Units SubCUTAneous 3 times per day    sodium chloride flush  5-40 mL IntraVENous 2 times per day    insulin lispro  0-8 Units SubCUTAneous TID WC    insulin lispro  0-4 Units SubCUTAneous Nightly     Continuous Infusions:   sodium bicarbonate infusion 100 mL/hr at 02/14/23 0651    sodium chloride      dextrose       PRN Meds:ondansetron, sodium chloride flush, sodium chloride, acetaminophen **OR** acetaminophen, glucose, dextrose bolus **OR** dextrose bolus, glucagon (rDNA), dextrose  I/O last 3 completed shifts: In: 1550 [IV Piggyback:1550]  Out: -   No intake/output data recorded.   No intake or output data in the 24 hours ending 02/14/23 0721    CBC:   Recent Labs     02/12/23 1640 02/13/23 0046 02/14/23 0044   WBC 8.7 8.2 5.9   HGB 12.9 12.4* 10.7*   * 113*  --        BMP:    Recent Labs     02/12/23 1640 02/13/23 0046 02/14/23  0044   * 134* 129*   K 4.0 3.8 3.6   CL 90* 94* 94*   CO2 24 24 22   BUN 17 25* 30*   CREATININE 0.8 1.3* 2.0*   GLUCOSE 339* 306* 270*     Hepatic:   Recent Labs     02/12/23 1640 02/13/23 0046 02/14/23 0044   * 224* 215*   ALT 21 28 30   BILITOT 1.2* 1.1* 0.6   ALKPHOS 89 79 69       Objective:   Vitals: BP (!) 141/85   Pulse 100   Temp 99.2 °F (37.3 °C) (Oral)   Resp 11   Ht 5' 5\" (1.651 m)   Wt (!) 305 lb (138.3 kg)   SpO2 95%   BMI 50.75 kg/m²   General appearance: in no acute distress  HEENT: normocephalic, atraumatic,   Neck: supple, trachea midline  Lungs: breathing comfortably on nc  Heart[de-identified] regular rate and rhythm,  Extremities: extremities atraumatic, no cyanosis or edema    MEDICAL DECISION MAKING       -RAMON from ATN in setting of volume depletion and rhabdo; cr 1.3 from normal baseline//no HN on CT AP//UA without rbcs or wbcs  -Hyponatremia  -Staph bacteremia  -Proteinuria on UA; recheck once RAMON resolves  -Transaminitis  -Rhabdo s/p being found on the floor ; TSH and phos wnl  -CHF hx  -DM2     Suggest:  -Cr up today, she was already in ATN from the moment she came in  -Add bicarb to her IVF and decrease to 75ml/hr  -Start her oral lasix as hx of fluid overload  -Avoid nephrotoxins                Electronically signed by Sony May DO on 2/14/2023 at 7:21 AM    [address] Portillo DO [address],  [facility] [address]  PHONE: [phone]  FAX: [phone]

## 2023-02-14 NOTE — CARE COORDINATION
ARU will review clinicals and therapy evals. Will notify CM once review is completed. If ARU appropriate patient will need prior auth with [facility]. 0900:  Patient to have JONI done today (2/14). Will follow for results.

## 2023-02-14 NOTE — CARE COORDINATION
CM - Room 3130--[facility] criteria for Sepsis  reviewed at this time, criteria supports the submitted INPATIENT admission

## 2023-02-14 NOTE — PROGRESS NOTES
Physical Therapy Treatment Note  Name: Carol Berrios MRN: [id number] :   1965   Date:  2023   Admission Date: 2023 Room:  [address] [place], [place] [address]     Restrictions/Precautions:          general precautions, fall risk    Communication with other providers:  OT    Subjective:  Patient states:  \"Thank you for helping me\"  Pain:   Location, Type, Intensity (0/10 to 10/10):  denies pain    Objective:    Observation:  Pt supine in bed upon entry and agreeable to session    Treatment, including education/measures:    Bed mobility: pt completed supine to sitting EOB mod A with assist at trunk. Pt completed sit to supine mod A with assist at bilateral LEs and hips. Cues provided for sequencing throughout.     Transfers: pt completed STS from EOB min A, to WC min A, and stand pivot from WC to EOB mod A. Cues provided for hand placement throughout    Gait: pt ambulated 10' with RW CGA with decreased jennifer, wide TENA, and decreased step length and height bilaterally. Pt with one initial LOB requiring mod A to recover. Cues provided for posture and walker management throughout.    Assessment / Impression:     Pt supine in bed at end of session with needs in reach and alarm on    Patient's tolerance of treatment:  well   Adverse Reaction: n/a  Significant change in status and impact:  improved functional mobility this date  Barriers to improvement:  continue to address transfer training and gait training in future sessison    Plan for Next Session:    Continue to address bed mobility, transfer training, and gait training    Time in:  1521  Time out:  1551  Timed treatment minutes:  30  Total treatment time:  30    Previously filed items:  Social/Functional History  Lives With: Alone  Type of Home: Apartment  Home Access: Level entry  Bathroom Shower/Tub: Tub/Shower unit  Bathroom Toilet: Standard  Has the patient had two or more falls in the past year or any fall with injury in the past year?: Yes  Receives Help From:  Family  ADL Assistance: Independent  Homemaking Assistance: Independent  Homemaking Responsibilities: Yes  Ambulation Assistance: Independent  Transfer Assistance: Independent  Active : Yes  Occupation: On disability        Short Term Goals  Time Frame for Short Term Goals: 1 week  Short Term Goal 1: pt to complete all bed mobility min A x1  Short Term Goal 2: Pt to sit EOB 10 minutes SBA with no LOB  Short Term Goal 3: Pt to complete STS transfers to/from bed, commode, and chair mod A  Short Term Goal 4: Pt to ambulate 25' with LRAD mod A    Electronically signed by:    Sachin Bell, PT  2/14/2023, 4:21 PM

## 2023-02-14 NOTE — PROGRESS NOTES
Preliminary report on JONI  Normal echocardiographic evidence of endocarditis noted on JONI no vegetations noted no other significant abnormality noted

## 2023-02-14 NOTE — PROGRESS NOTES
[facility]  consulted by Dr. Cortez Rai for monitoring and adjustment. Indication for treatment: Sepsis; Bacteremia (Staph aureus, sensitivities pending)  Goal trough: Trough Goal: 15-20 mcg/mL  AUC/ALEJANDRO: 400-600    Risk Factors for MRSA Identified:   Positive blood cultures    Pertinent Laboratory Values:   Temp Readings from Last 3 Encounters:   02/14/23 99.2 °F (37.3 °C) (Oral)     Recent Labs     02/12/23  1640 02/13/23  0046 02/14/23  0044 02/14/23  0529   WBC 8.7 8.2 5.9  --    LACTATE  --   --  1.3 1.6       Recent Labs     02/12/23  1640 02/13/23 0046 02/14/23  0044   BUN 17 25* 30*   CREATININE 0.8 1.3* 2.0*       Estimated Creatinine Clearance: 44 mL/min (A) (based on SCr of 2 mg/dL (H)). No intake or output data in the 24 hours ending 02/14/23 1105      Pertinent Cultures:   Date    Source    Results  02/12   Blood    Staph aureus, sensitivities pending  02/12   Rapid Influenza, A/B  Negative  02/12   COVID-19, Rapid  Not detected  02/12   Urine    Negative  02/13   MRSA Screen (Nasal)  Pending    Assessment:  HPI: 62 y.o. female with history of HTN, DM-2, chronic diastolic congestive heart failure, pulmonary hypertension, obesity hypoventilation syndrome, and morbid obesity. Patient presents to ED with severe sepsis without septic shock. At presentation patient was noted to have /91, , RR 22, temp 100.2, saturating 98% on room air. Later patient was noted to be hypotensive, BP down to 86/67. ID following, plan to continue vancomycin until susceptibility available. Sensitivities still pending.    Renal trends:   RAMON, SCr worsening with total CK elevated  CK trends slightly improved today  SCr @ 0.8 on admission  Day(s) of therapy: 3 of 7  Vancomycin concentration:   02/14 - 19.6, collected 8h post-dose,     Plan:  Adjust from scheduled to intermittent vancomycin dosing with worsening RAMON  Predicted  if continued on 1500 mg IVPB q24h  Hold vancomycin today and repeat level tomorrow AM  Additional doses to be ordered as needed based on levels until renal trends improve  Look to de-escalate vancomycin if able/pending sensitivities  Pharmacy will continue to monitor patient and adjust therapy as indicated    Barron 3 2/15 @ 0600    Thank you for the consult,  Elana Lawrence [facility], PharmD  2/14/2023 11:05 AM

## 2023-02-14 NOTE — CARE COORDINATION
Case Management Assessment  Initial Evaluation    Date/Time of Evaluation: 2/14/2023 4:58 PM  Assessment Completed by: ALEXIA Pedraza    If patient is discharged prior to next notation, then this note serves as note for discharge by case management. Patient Name: Mary Martinez                   YOB: 1965  Diagnosis: Severe sepsis without septic shock ([facility] [address] [A41.9, R65.20]                   Date / Time: 2/12/2023  3:17 PM    Patient Admission Status: Inpatient   Readmission Risk (Low < 19, Mod (19-27), High > 27): Readmission Risk Score: 13.8    Current PCP: No primary care provider on file. PCP verified by CM? Yes    Chart Reviewed: Yes      History Provided by: Patient  Patient Orientation: Alert and Oriented    Patient Cognition: Alert    Hospitalization in the last 30 days (Readmission):  No    If yes, Readmission Assessment in CM Navigator will be completed. Advance Directives:      Code Status: Full Code   Patient's Primary Decision Maker is: Patient Declined (Legal Next of Kin Remains as Decision Maker)      Discharge Planning:    Patient lives with:   Type of Home:    Primary Care Giver: Self  Patient Support Systems include: Children   Current Financial resources: Medicare  Current community resources: None  Current services prior to admission:              Current DME:              Type of Home Care services:       ADLS  Prior functional level: Independent in ADLs/IADLs  Current functional level: Independent in ADLs/IADLs    PT AM-PAC: 8 /24  OT AM-PAC:   /24    Family can provide assistance at DC: Yes  Would you like Case Management to discuss the discharge plan with any other family members/significant others, and if so, who?  Yes  Plans to Return to Present Housing: Yes  Other Identified Issues/Barriers to RETURNING to current housing: ID consult, ARU referral   Potential Assistance needed at discharge:              Potential DME:    Patient expects to discharge to:  ARU  Plan for transportation at discharge:      Financial    Payor: [employer] / Plan: Annika Farrell ESSENTIAL/PLUS / Product Type: *No Product type* /     Does insurance require precert for SNF: Yes    Potential assistance Purchasing Medications:    Meds-to-Beds request: No      [id number] [place], [place] - [address] [address] [address]  Phone: [phone] Fax: [phone]      Notes:    Factors facilitating achievement of predicted outcomes: Family support, Motivated, Cooperative, and Pleasant    Barriers to discharge: ARU referral    Additional Case Management Notes: CM in to see Pt to initiate discharge planning. Pt agreeable to therapy recommendation of ARU. Trina following. Pt denies any needs at this time. The Plan for Transition of Care is related to the following treatment goals of Severe sepsis without septic shock ([facility] [address] [A41.9, R65.20]      The Patient and/or Patient Representative Agree with the Discharge Plan?   Yes     Rose Perez [facility]  Case Management Department  Ph: [phone]

## 2023-02-14 NOTE — PROGRESS NOTES
[facility]  [address]  Phone: [phone]    Fax [phone]                  Edison Martinez MD, Sherwin Dela Cruz MD, Ivette Ghotra MD, Jules Rodney, MD Ector Blessing, MD Wilfredo Canavan, MD Katharina Roberts MD Dr. Oral Fail MD Roxy Eisenmenger, MASON Villanueva, MASON Cifuentes, APRN Chandler Meckel, MASON Rushing, DENISE    CARDIOLOGY  NOTE      Name:  Donal Boucher /Age/Sex: 1965  (62 y.o. female)   MRN & CSN:  [id number] & [id number] Admission Date/Time: 2023  3:17 PM   [facility]:  [facility] PCP: No primary care provider on file. Hospital Day: 3    - Cardiology consult is for: JONI      ASSESSMENT/ PLAN:  MSSA bacteremia  -No evidence of endocarditis noted on JONI  -Infectious disease on board  Acute on chronic kidney injury  Rhabdomyolysis s/p fall  -Nephrology on board  Chronic HFpEF  -Does not appear to be in acute exacerbation  -Continue Lasix 40 mg daily  Hypertension  -Blood pressure stable at this time  -Continue Coreg 6.25 mg twice daily  Type 2 diabetes mellitus  -Hemoglobin A1c of 9.5  Morbid obesity  -BMI of 50.75. Advised healthy diet and exercise      Cardiology will sign off please call with any questions. Subjective:  Janey Juarez is a 62 y. o.year old     Inform patient of normal JONI. Informed her no further cardiac work-up at this time. Patient voiced understanding. Objective: Temperature:  Current - Temp: 99.2 °F (37.3 °C);  Max - Temp  Av.3 °F (37.4 °C)  Min: 99.2 °F (37.3 °C)  Max: 99.3 °F (37.4 °C)    Respiratory Rate : Resp  Av.3  Min: 11  Max: 39    Pulse Range: Pulse  Av.7  Min: 83  Max: 106    Blood Presuure Range:  Systolic (64LBZ), QCR:298 , Min:103 , VSX:768   ; Diastolic (33AXP), JXF:02, Min:58, Max:115      Pulse ox Range: SpO2  Av.1 %  Min: 95 %  Max: 100 %    24hr I & O:    Intake/Output Summary (Last 24 hours) at 2/14/2023 1826  Last data filed at 2/14/2023 1818  Gross per 24 hour   Intake --   Output 875 ml   Net -875 ml         /65   Pulse 84   Temp 99.2 °F (37.3 °C) (Oral)   Resp 27   Ht 5' 5\" (1.651 m)   Wt (!) 305 lb (138.3 kg)   SpO2 98%   BMI 50.75 kg/m²         TELEMETRY: Sinus      has no past medical history on file. has no past surgical history on file. Physical Exam  Constitutional:       General: She is not in acute distress. Appearance: She is obese. She is not diaphoretic. HENT:      Head: Normocephalic and atraumatic. Right Ear: External ear normal.      Left Ear: External ear normal.      Nose: Nose normal.      Mouth/Throat:      Mouth: Mucous membranes are moist.   Eyes:      Extraocular Movements: Extraocular movements intact. Comments: Pupils equal and round   Neck:      Vascular: No carotid bruit. Cardiovascular:      Rate and Rhythm: Normal rate and regular rhythm. Pulses: Normal pulses. Heart sounds: S1 normal and S2 normal. No murmur heard. No friction rub. No gallop. Pulmonary:      Effort: Pulmonary effort is normal. No respiratory distress. Breath sounds: Normal breath sounds. No rales. Chest:      Chest wall: No tenderness. Abdominal:      Palpations: Abdomen is soft. Tenderness: There is no abdominal tenderness. Musculoskeletal:      Right lower leg: No edema. Left lower leg: No edema. Skin:     General: Skin is warm and dry. Capillary Refill: Capillary refill takes less than 2 seconds. Findings: No rash. Comments: Skin turgor brisk   Neurological:      Mental Status: She is alert and oriented to person, place, and time. Mental status is at baseline. Psychiatric:         Behavior: Behavior is cooperative. Thought Content:  Thought content normal.         Judgment: Judgment normal.        Medications:    furosemide  40 mg Oral Daily    carvedilol  6.25 mg Oral BID WC    vancomycin (VANCOCIN) intermittent dosing (placeholder)   Other RX Placeholder    insulin glargine  5 Units SubCUTAneous Daily with breakfast    heparin (porcine)  5,000 Units SubCUTAneous 3 times per day    sodium chloride flush  5-40 mL IntraVENous 2 times per day    insulin lispro  0-8 Units SubCUTAneous TID WC    insulin lispro  0-4 Units SubCUTAneous Nightly      sodium bicarbonate infusion 75 mL/hr at 02/14/23 1316    sodium chloride      dextrose       ondansetron, sodium chloride flush, sodium chloride, acetaminophen **OR** acetaminophen, glucose, dextrose bolus **OR** dextrose bolus, glucagon (rDNA), dextrose    Lab Data:  CBC:   Recent Labs     02/12/23 1640 02/13/23 0046 02/14/23 0044   WBC 8.7 8.2 5.9   HGB 12.9 12.4* 10.7*   HCT 42.3 40.6 33.5*   MCV 86.5 88.1 85.5   * 113* 140     BMP:   Recent Labs     02/12/23 1640 02/13/23 0046 02/13/23  0920 02/14/23 0044   * 134*  --  129*   K 4.0 3.8  --  3.6   CL 90* 94*  --  94*   CO2 24 24  --  22   PHOS  --   --  3.2  --    BUN 17 25*  --  30*   CREATININE 0.8 1.3*  --  2.0*     LIVER PROFILE:   Recent Labs     02/12/23 1640 02/13/23 0046 02/14/23 0044   * 224* 215*   ALT 21 28 30   LIPASE 8*  --   --    BILITOT 1.2* 1.1* 0.6   ALKPHOS 89 79 69     PT/INR: No results for input(s): PROTIME, INR in the last 72 hours. APTT: No results for input(s): APTT in the last 72 hours. BNP:  No results for input(s): BNP in the last 72 hours. TROPONIN: No results for input(s): TROPONINT in the last 72 hours. Labs, consult, tests reviewed          Sabino Soto PA-C, 2/14/2023 6:26 PM   I have seen ,spoken to  and examined this patient personally, independently of the KYRA. I have reviewed the hospital care given to date and reviewed all pertinent labs and imaging. I have spoken with patient, nursing staff and provided written and verbal instructions . The above note has been reviewed     CARDIOLOGY ATTENDING ADDENDUM    HPI:  I have reviewed the above HPI  And agree with above     Pulse Range: Pulse  Av.7  Min: 83  Max: 106    Blood Presuure Range:  Systolic (61SUR), UCJ:574 , Min:103 , IQM:943   ; Diastolic (44IWF), PXY:78, Min:58, Max:115      Pulse ox Range: SpO2  Av.1 %  Min: 95 %  Max: 100 %    24hr I & O:    Intake/Output Summary (Last 24 hours) at 2023  Last data filed at 2023 1818  Gross per 24 hour   Intake --   Output 875 ml   Net -875 ml         /65   Pulse 84   Temp 99.2 °F (37.3 °C) (Oral)   Resp 27   Ht 5' 5\" (1.651 m)   Wt (!) 305 lb (138.3 kg)   SpO2 98%   BMI 50.75 kg/m²       Physical Exam:  General:  Awake, alert, NAD  Head:normal  Eye:normal  Neck:  No JVD   Chest:  Clear to auscultation, respiration easy  Cardiovascular:  RRR S1S2  Abdomen:   nontender  Extremities:  tr edema  Pulses; palpable  Neuro: grossly normal      MEDICAL DECISION MAKING;    Pt assessed , chart reviewed, patient examined examined , all available data was reviewed, following is the plan which was discussed with KYRA as well:    -Since JONI was negative for endocarditis performed today  -HFrEF on Lasix continue  -Hypertension on Coreg continue blood pressure  Control  -non-insulin-dependent diabetes high A1c per primary team  -CAD work-up as outpatient once aseptic  We will follow-up as needed          Sheryle Dale MD [facility]

## 2023-02-14 NOTE — PROGRESS NOTES
Occupational Therapy Treatment Note  Name: Christian Fisher MRN: [id number] :   1965   Date:  2023   Admission Date: 2023 Room:  [facility]     Primary Problem:  The primary encounter diagnosis was Fall from bed, initial encounter. Diagnoses of Nausea and vomiting, unspecified vomiting type, Severe sepsis (HCC), Hyponatremia, Acute pulmonary edema ([address] and Hypotension, unspecified hypotension type were also pertinent to this visit. History reviewed. No pertinent past medical history. Communication with other providers: CoTx with PT for pt safety and tolerance d/t pt high fall risk and body habitus, RN handoff     Subjective:  Patient states:  \"I took steps! \"  Pain: declined   Restrictions: general, fall, tele   No one at bedside    Objective:    Observation:  pt was in bed upon arrival low in bed with BLE off EOB, agreeable to session. Noted that purwick was not plugged in and bedding soiled in urine. Bedding change required upon STS. Treatment, including education:    Therapeutic Activity Training:   Therapeutic activity training was instructed today. Cues were given for safety, sequence, UE/LE placement, visual cues, and balance. Activities performed today included:    Bed mobility:  Pt completed sup to sit with ModA, inc time, cues for sequencing, and use of bed rails. Pt returned to sup with ModA at BLE and hips. Safe descent of trunk. Scooting:  Pt completed anterior and retro scooting at EOB with SBA and inc effort noted. Seated balance:  Pt tolerated EOB well without evidence of lean SBA. STS:  Pt completed from EOB with Valeriy to [address]. Pt completed to [facility] with Valeriy for safe eccentric control. Pt compelted from WC to EOB with ModAx1 and CGAx1 for inc safety without AD. Ambulation:  Pt ambulated ~10' CGA with FWW to [facility] as goal. Pt demo 1 LOB with ModA to recover. See PT note for further details regarding ambulation.      Self Care Training:   Self care training was performed today. Cues were given for safety, sequence, UE/LE placement, visual cues, and balance. Activities performed today included:    UB dressing:  Pt doffed/donned new gown with ModA seated in [facility]. Toileting:  Pt noted to be incontinent and provided DEP assist for elicia care. Hair grooming:  Pt brushed hair at EOB with SetupA. Oral hygiene:  Pt brushed teeth at EOB with SetupA. Total assist to bring to pt and cleanup. Education: Role of OT, OT POC, safety, benefits of EOB/OOB activity, rationale for treatment, importance of frequent mobility     Safety Measures: Gait belt used for safety of pt and therapist, Left in bed, Alarm in place, call light and phone within reach, lines managed, needs met     Assessment / Impression:    Pt improved from initial eval. Address standing ADLs at next session.      Patient's tolerance of treatment: Good   Adverse Reaction: none   Significant change in status and impact:  improved cog and functional mobility from initial eval   Barriers to improvement: strength, endurance, transfers, standing balance     Plan for Next Session:    Continue OT POC    Time in:  1521  Time out:  1551  Timed treatment minutes:  25  Total treatment time:  30    Electronically signed by:    ISMAEL Vale/FRANCISCO  License: [id number]  4/13/8212, 5:10 PM

## 2023-02-14 NOTE — PROGRESS NOTES
Infectious Disease Progress Note  2023   Patient Name: Taiwo Yoder : 1965   Impression  Severe Sepsis Secondary to MSSA Bacteremia Possible Pneumonia:  BREE Lung Nodule:  Cholelithiasis without Cholecystitis:  Afebrile, no leukocytosis  Pct 12.96, CRP on DWT from  >300, 246.8  CrCl: 44  AST elevated 145, 224 likely from rhabdomyolysis   -Rapid Flu, COVID-19 Negative  -BC  Staphylococcus aureus, Spp and sensi pending  -BC Pending  -MRSA/MSSA screen Pending  -Urine culture: Mixed skin/urogenital carie  -CT Chest A&P W contrast: 1. Linear bands of atelectasis/scarring in the right lower chest and some   scattered calcified granulomas without signs of pneumonia. 2.  A noncalcified 3 mm nodule in the left upper lobe would not require   further workup in a low risk patient. 3.  The liver is enlarged and portion of the right lateral abdomen including   the edge of the liver is cut off the edge of the exam.  No apparent mass or   nodularity within the liver. 4.  There is cholelithiasis but without fat stranding around the gallbladder   fossa. 5.  No bowel obstruction, pneumoperitoneum, or sign of diverticulitis. Some   gastritis in the stomach is suspected. 6.  Some stranding around both kidneys appear symmetric and may relate to   chronic renal disease. There is no hydronephrosis or hydroureter. 7.  Fatty atrophy of the pancreas. -JONI: No evidence of endocarditis.      RAMON:  Rhabdomyolysis s/p Fall:  Dr. Sagrario Summers onboard     DMII:  Chronic Hypoxic Respiratory Failure:   Baseline oxygen 2L/min/nc  HTN/ HFpEF/ PHTN:  Morbid Obesity: BMI:  50.75  Multi-morbidity: per PMHx     Plan:  Continue IV vancomycin per pharmacy dosing  Will await final spp of Staph aureus, if MSSA, will transition to IV nafcillin continuous gtt until [facility] show NGTD  Trend CRP and Pct, ordered  Repeat BC until negative at 48 hours  Ordered repeat [facility]   Reviewed JONI, negative for IE    Ongoing Antimicrobial Therapy  Vancomycin 2/12-? Completed Antimicrobial Therapy  Zosyn 2/12-13?? History:? Interval history noted. Chief complaint: Sepsis secondary to MSSA bacteremia. Denies n/v/d/f or untoward effects of antibiotics  Physical Exam:  Vital Signs: /65   Pulse 84   Temp 99.2 °F (37.3 °C) (Oral)   Resp 27   Ht 5' 5\" (1.651 m)   Wt (!) 305 lb (138.3 kg)   SpO2 98%   BMI 50.75 kg/m²     Gen: drowsy, oriented x 3  Chest: no distress and CTA. Good air movement. Oxygen per NC. Heart: RRR and no MRG. Abd: soft, non-distended, no tenderness, no hepatomegaly. Normoactive bowel sounds. Ext: no clubbing, cyanosis, or edema  Neuro: Mental status intact. CN 2-12 intact and no focal sensory or motor deficits     Radiologic / Imaging / TESTING  2/12/2023 CT Head WO Contrast:  Impression   No acute intracranial abnormality. 2/12/2023 CT Cervical Spine WO Contrast:  Impression   No acute abnormality of the cervical spine. 2/12/2023 XR Chest Portable:  Impression   1. Cardiomegaly and findings favored to reflect pulmonary edema. 2/12/2023 CT Chest Abdomen Pelvis W Contrast:  Impression   1. Linear bands of atelectasis/scarring in the right lower chest and some   scattered calcified granulomas without signs of pneumonia. 2.  A noncalcified 3 mm nodule in the left upper lobe would not require   further workup in a low risk patient. 3.  The liver is enlarged and portion of the right lateral abdomen including   the edge of the liver is cut off the edge of the exam.  No apparent mass or   nodularity within the liver. 4.  There is cholelithiasis but without fat stranding around the gallbladder   fossa. 5.  No bowel obstruction, pneumoperitoneum, or sign of diverticulitis. Some   gastritis in the stomach is suspected. 6.  Some stranding around both kidneys appear symmetric and may relate to   chronic renal disease. There is no hydronephrosis or hydroureter. 7.  Fatty atrophy of the pancreas. 2/13/2023 XR Chest Portable;  Impression   Linear changes in the right lung base which could represent atelectasis or   infiltrate. Small right effusion. Follow up to resolution is suggested. Labs:    Recent Results (from the past 24 hour(s))   POCT Glucose    Collection Time: 02/13/23  4:42 PM   Result Value Ref Range    POC Glucose 281 (H) 70 - 99 MG/DL   POCT Glucose    Collection Time: 02/13/23  9:09 PM   Result Value Ref Range    POC Glucose 216 (H) 70 - 99 MG/DL   Brain Natriuretic Peptide    Collection Time: 02/14/23 12:44 AM   Result Value Ref Range    Pro-BNP 1,415 (H) <300 PG/ML   Hemoglobin A1C    Collection Time: 02/14/23 12:44 AM   Result Value Ref Range    Hemoglobin A1C 9.5 (H) 4.2 - 6.3 %    eAG 226 mg/dL   Lactic Acid    Collection Time: 02/14/23 12:44 AM   Result Value Ref Range    Lactate 1.3 0.5 - 1.9 mMOL/L   Vancomycin Level, Random    Collection Time: 02/14/23 12:44 AM   Result Value Ref Range    Vancomycin Rm 19.6 UG/ML    DOSE AMOUNT DOSE AMT.  GIVEN - 1,500 MG     DOSE TIME DOSE TIME GIVEN - 1800    Procalcitonin    Collection Time: 02/14/23 12:44 AM   Result Value Ref Range    Procalcitonin 12.96    C-Reactive Protein    Collection Time: 02/14/23 12:44 AM   Result Value Ref Range    CRP High Sensitivity 246.8 (H) <5.0 mg/L   CBC with Auto Differential    Collection Time: 02/14/23 12:44 AM   Result Value Ref Range    WBC 5.9 4.0 - 10.5 K/CU MM    RBC 3.92 (L) 4.2 - 5.4 M/CU MM    Hemoglobin 10.7 (L) 12.5 - 16.0 GM/DL    Hematocrit 33.5 (L) 37 - 47 %    MCV 85.5 78 - 100 FL    MCH 27.3 27 - 31 PG    MCHC 31.9 (L) 32.0 - 36.0 %    RDW 13.7 11.7 - 14.9 %    Platelets 786 944 - 608 K/CU MM    Differential Type AUTOMATED DIFFERENTIAL     Segs Relative 81.9 (H) 36 - 66 %    Lymphocytes % 8.9 (L) 24 - 44 %    Monocytes % 7.9 (H) 0 - 4 %    Eosinophils % 0.0 0 - 3 %    Basophils % 0.3 0 - 1 %    Segs Absolute 4.8 K/CU MM    Lymphocytes Absolute 0.5 K/CU MM    Monocytes Absolute 0.5 K/CU MM    Eosinophils Absolute 0.0 K/CU MM    Basophils Absolute 0.0 K/CU MM    Nucleated RBC % 0.0 %    Total Nucleated RBC 0.0 K/CU MM    Total Immature Neutrophil 0.06 K/CU MM    Immature Neutrophil % 1.0 (H) 0 - 0.43 %   Comprehensive Metabolic Panel    Collection Time: 02/14/23 12:44 AM   Result Value Ref Range    Sodium 129 (L) 135 - 145 MMOL/L    Potassium 3.6 3.5 - 5.1 MMOL/L    Chloride 94 (L) 99 - 110 mMol/L    CO2 22 21 - 32 MMOL/L    BUN 30 (H) 6 - 23 MG/DL    Creatinine 2.0 (H) 0.6 - 1.1 MG/DL    Est, Glom Filt Rate 29 (L) >60 mL/min/1.73m2    Glucose 270 (H) 70 - 99 MG/DL    Calcium 7.4 (L) 8.3 - 10.6 MG/DL    Albumin 3.1 (L) 3.4 - 5.0 GM/DL    Total Protein 5.3 (L) 6.4 - 8.2 GM/DL    Total Bilirubin 0.6 0.0 - 1.0 MG/DL    ALT 30 10 - 40 U/L     (H) 15 - 37 IU/L    Alkaline Phosphatase 69 40 - 128 IU/L    Anion Gap 13 4 - 16   Magnesium    Collection Time: 02/14/23 12:44 AM   Result Value Ref Range    Magnesium 1.8 1.8 - 2.4 mg/dl   CK    Collection Time: 02/14/23 12:44 AM   Result Value Ref Range    Total CK 13,076 (H) 26 - 140 IU/L   Lactic Acid    Collection Time: 02/14/23  5:29 AM   Result Value Ref Range    Lactate 1.6 0.5 - 1.9 mMOL/L   POCT Glucose    Collection Time: 02/14/23 12:51 PM   Result Value Ref Range    POC Glucose 237 (H) 70 - 99 MG/DL     CULTURE results: Invalid input(s): BLOOD CULTURE,  URINE CULTURE, SURGICAL CULTURE    Diagnosis:  Patient Active Problem List   Diagnosis    Severe sepsis without septic shock (HCC)    Accidental fall from bed    Sepsis due to methicillin susceptible Staphylococcus aureus (MSSA) without acute organ dysfunction (HCC)    Staphylococcus aureus bacteremia    Class 3 severe obesity due to excess calories with serious comorbidity and body mass index (BMI) of 50.0 to 59.9 in adult [facility])       Active Problems  Principal Problem:    Severe sepsis without septic shock (HCC)  Active Problems:    Accidental fall from bed    Sepsis due to methicillin susceptible Staphylococcus aureus (MSSA) without acute organ dysfunction (HCC)    Staphylococcus aureus bacteremia    Class 3 severe obesity due to excess calories with serious comorbidity and body mass index (BMI) of 50.0 to 59.9 in adult [facility])  Resolved Problems:    * No resolved hospital problems. *    Electronically signed by: Electronically signed by Jewel Ritchie.  MASON Bryan CNP on 2/14/2023 at 2:43 PM

## 2023-02-15 LAB
ALBUMIN SERPL-MCNC: 2.8 GM/DL (ref 3.4–5)
ALP BLD-CCNC: 67 IU/L (ref 40–128)
ALT SERPL-CCNC: 24 U/L (ref 10–40)
ANION GAP SERPL CALCULATED.3IONS-SCNC: 10 MMOL/L (ref 4–16)
AST SERPL-CCNC: 129 IU/L (ref 15–37)
BASOPHILS ABSOLUTE: 0 K/CU MM
BASOPHILS RELATIVE PERCENT: 0.4 % (ref 0–1)
BILIRUB SERPL-MCNC: 0.3 MG/DL (ref 0–1)
BUN SERPL-MCNC: 28 MG/DL (ref 6–23)
CALCIUM SERPL-MCNC: 7.7 MG/DL (ref 8.3–10.6)
CHLORIDE BLD-SCNC: 99 MMOL/L (ref 99–110)
CO2: 27 MMOL/L (ref 21–32)
CREAT SERPL-MCNC: 1.7 MG/DL (ref 0.6–1.1)
CRP SERPL HS-MCNC: 164.6 MG/L
CULTURE: ABNORMAL
DIFFERENTIAL TYPE: ABNORMAL
DOSE AMOUNT: NORMAL
DOSE TIME: NORMAL
EOSINOPHILS ABSOLUTE: 0 K/CU MM
EOSINOPHILS RELATIVE PERCENT: 0.2 % (ref 0–3)
GFR SERPL CREATININE-BSD FRML MDRD: 35 ML/MIN/1.73M2
GLUCOSE BLD-MCNC: 271 MG/DL (ref 70–99)
GLUCOSE BLD-MCNC: 272 MG/DL (ref 70–99)
GLUCOSE BLD-MCNC: 282 MG/DL (ref 70–99)
GLUCOSE BLD-MCNC: 289 MG/DL (ref 70–99)
GLUCOSE SERPL-MCNC: 245 MG/DL (ref 70–99)
HCT VFR BLD CALC: 31.3 % (ref 37–47)
HEMOGLOBIN: 9.7 GM/DL (ref 12.5–16)
IMMATURE NEUTROPHIL %: 0.7 % (ref 0–0.43)
LYMPHOCYTES ABSOLUTE: 0.6 K/CU MM
LYMPHOCYTES RELATIVE PERCENT: 11 % (ref 24–44)
Lab: ABNORMAL
Lab: ABNORMAL
MAGNESIUM: 2 MG/DL (ref 1.8–2.4)
MCH RBC QN AUTO: 26.6 PG (ref 27–31)
MCHC RBC AUTO-ENTMCNC: 31 % (ref 32–36)
MCV RBC AUTO: 85.8 FL (ref 78–100)
MONOCYTES ABSOLUTE: 0.5 K/CU MM
MONOCYTES RELATIVE PERCENT: 9.5 % (ref 0–4)
NUCLEATED RBC %: 0 %
PDW BLD-RTO: 13.8 % (ref 11.7–14.9)
PLATELET # BLD: 161 K/CU MM (ref 140–440)
PMV BLD AUTO: 11.6 FL (ref 7.5–11.1)
POTASSIUM SERPL-SCNC: 3.5 MMOL/L (ref 3.5–5.1)
PROCALCITONIN SERPL-MCNC: 5.2 NG/ML
RBC # BLD: 3.65 M/CU MM (ref 4.2–5.4)
SEGMENTED NEUTROPHILS ABSOLUTE COUNT: 4.3 K/CU MM
SEGMENTED NEUTROPHILS RELATIVE PERCENT: 78.2 % (ref 36–66)
SODIUM BLD-SCNC: 136 MMOL/L (ref 135–145)
SPECIMEN: ABNORMAL
SPECIMEN: ABNORMAL
TOTAL CK: 5628 IU/L (ref 26–140)
TOTAL IMMATURE NEUTOROPHIL: 0.04 K/CU MM
TOTAL NUCLEATED RBC: 0 K/CU MM
TOTAL PROTEIN: 5 GM/DL (ref 6.4–8.2)
VANCOMYCIN RANDOM: 6.2 UG/ML
WBC # BLD: 5.5 K/CU MM (ref 4–10.5)

## 2023-02-15 PROCEDURE — 2580000003 HC RX 258: Performed by: INTERNAL MEDICINE

## 2023-02-15 PROCEDURE — 85025 COMPLETE CBC W/AUTO DIFF WBC: CPT

## 2023-02-15 PROCEDURE — 97535 SELF CARE MNGMENT TRAINING: CPT

## 2023-02-15 PROCEDURE — 84145 PROCALCITONIN (PCT): CPT

## 2023-02-15 PROCEDURE — 1200000000 HC SEMI PRIVATE

## 2023-02-15 PROCEDURE — 6360000002 HC RX W HCPCS: Performed by: STUDENT IN AN ORGANIZED HEALTH CARE EDUCATION/TRAINING PROGRAM

## 2023-02-15 PROCEDURE — 80053 COMPREHEN METABOLIC PANEL: CPT

## 2023-02-15 PROCEDURE — 2500000003 HC RX 250 WO HCPCS: Performed by: INTERNAL MEDICINE

## 2023-02-15 PROCEDURE — 36415 COLL VENOUS BLD VENIPUNCTURE: CPT

## 2023-02-15 PROCEDURE — 6370000000 HC RX 637 (ALT 250 FOR IP): Performed by: INTERNAL MEDICINE

## 2023-02-15 PROCEDURE — 2500000003 HC RX 250 WO HCPCS: Performed by: NURSE PRACTITIONER

## 2023-02-15 PROCEDURE — 97116 GAIT TRAINING THERAPY: CPT

## 2023-02-15 PROCEDURE — 86140 C-REACTIVE PROTEIN: CPT

## 2023-02-15 PROCEDURE — 6360000002 HC RX W HCPCS: Performed by: INTERNAL MEDICINE

## 2023-02-15 PROCEDURE — 2580000003 HC RX 258: Performed by: NURSE PRACTITIONER

## 2023-02-15 PROCEDURE — 82550 ASSAY OF CK (CPK): CPT

## 2023-02-15 PROCEDURE — 6370000000 HC RX 637 (ALT 250 FOR IP): Performed by: STUDENT IN AN ORGANIZED HEALTH CARE EDUCATION/TRAINING PROGRAM

## 2023-02-15 PROCEDURE — 82962 GLUCOSE BLOOD TEST: CPT

## 2023-02-15 PROCEDURE — 80202 ASSAY OF VANCOMYCIN: CPT

## 2023-02-15 PROCEDURE — 83735 ASSAY OF MAGNESIUM: CPT

## 2023-02-15 PROCEDURE — 97112 NEUROMUSCULAR REEDUCATION: CPT

## 2023-02-15 RX ORDER — INSULIN GLARGINE 100 [IU]/ML
15 INJECTION, SOLUTION SUBCUTANEOUS
Status: DISCONTINUED | OUTPATIENT
Start: 2023-02-15 | End: 2023-02-17 | Stop reason: HOSPADM

## 2023-02-15 RX ORDER — INSULIN LISPRO 100 [IU]/ML
5 INJECTION, SOLUTION INTRAVENOUS; SUBCUTANEOUS
Status: DISCONTINUED | OUTPATIENT
Start: 2023-02-15 | End: 2023-02-17 | Stop reason: HOSPADM

## 2023-02-15 RX ADMIN — INSULIN LISPRO 4 UNITS: 100 INJECTION, SOLUTION INTRAVENOUS; SUBCUTANEOUS at 08:41

## 2023-02-15 RX ADMIN — FUROSEMIDE 40 MG: 40 TABLET ORAL at 08:41

## 2023-02-15 RX ADMIN — INSULIN LISPRO 5 UNITS: 100 INJECTION, SOLUTION INTRAVENOUS; SUBCUTANEOUS at 08:42

## 2023-02-15 RX ADMIN — CARVEDILOL 6.25 MG: 6.25 TABLET, FILM COATED ORAL at 08:41

## 2023-02-15 RX ADMIN — CARVEDILOL 6.25 MG: 6.25 TABLET, FILM COATED ORAL at 17:33

## 2023-02-15 RX ADMIN — NAFCILLIN SODIUM 12000 MG: 2 INJECTION, POWDER, LYOPHILIZED, FOR SOLUTION INTRAMUSCULAR; INTRAVENOUS at 22:07

## 2023-02-15 RX ADMIN — ACETAMINOPHEN 650 MG: 325 TABLET ORAL at 15:03

## 2023-02-15 RX ADMIN — ACETAMINOPHEN 650 MG: 325 TABLET ORAL at 08:46

## 2023-02-15 RX ADMIN — INSULIN LISPRO 5 UNITS: 100 INJECTION, SOLUTION INTRAVENOUS; SUBCUTANEOUS at 17:33

## 2023-02-15 RX ADMIN — INSULIN LISPRO 4 UNITS: 100 INJECTION, SOLUTION INTRAVENOUS; SUBCUTANEOUS at 12:25

## 2023-02-15 RX ADMIN — ACETAMINOPHEN 650 MG: 325 TABLET ORAL at 21:58

## 2023-02-15 RX ADMIN — SODIUM BICARBONATE: 84 INJECTION, SOLUTION INTRAVENOUS at 21:18

## 2023-02-15 RX ADMIN — HEPARIN SODIUM 5000 UNITS: 5000 INJECTION INTRAVENOUS; SUBCUTANEOUS at 05:38

## 2023-02-15 RX ADMIN — INSULIN LISPRO 4 UNITS: 100 INJECTION, SOLUTION INTRAVENOUS; SUBCUTANEOUS at 17:33

## 2023-02-15 RX ADMIN — VANCOMYCIN HYDROCHLORIDE 1250 MG: 1.25 INJECTION, POWDER, LYOPHILIZED, FOR SOLUTION INTRAVENOUS at 10:33

## 2023-02-15 RX ADMIN — HEPARIN SODIUM 5000 UNITS: 5000 INJECTION INTRAVENOUS; SUBCUTANEOUS at 21:08

## 2023-02-15 RX ADMIN — HEPARIN SODIUM 5000 UNITS: 5000 INJECTION INTRAVENOUS; SUBCUTANEOUS at 14:02

## 2023-02-15 RX ADMIN — INSULIN GLARGINE 15 UNITS: 100 INJECTION, SOLUTION SUBCUTANEOUS at 08:47

## 2023-02-15 RX ADMIN — SODIUM CHLORIDE, PRESERVATIVE FREE 10 ML: 5 INJECTION INTRAVENOUS at 08:42

## 2023-02-15 RX ADMIN — INSULIN LISPRO 5 UNITS: 100 INJECTION, SOLUTION INTRAVENOUS; SUBCUTANEOUS at 12:25

## 2023-02-15 ASSESSMENT — PAIN DESCRIPTION - ORIENTATION
ORIENTATION: RIGHT
ORIENTATION: RIGHT
ORIENTATION: POSTERIOR

## 2023-02-15 ASSESSMENT — PAIN DESCRIPTION - DESCRIPTORS
DESCRIPTORS: DULL
DESCRIPTORS: ACHING;DISCOMFORT;PRESSURE
DESCRIPTORS: DULL

## 2023-02-15 ASSESSMENT — PAIN DESCRIPTION - LOCATION
LOCATION: EYE

## 2023-02-15 ASSESSMENT — PAIN SCALES - GENERAL
PAINLEVEL_OUTOF10: 6
PAINLEVEL_OUTOF10: 9

## 2023-02-15 ASSESSMENT — PAIN DESCRIPTION - PAIN TYPE: TYPE: ACUTE PAIN

## 2023-02-15 NOTE — PROGRESS NOTES
Occupational Therapy Treatment Note  Name: Ethel Lafleur MRN: [id number] :   1965   Date:  2/15/2023   Admission Date: 2023 Room:  [address] [place] [address]-A     Primary Problem:  The primary encounter diagnosis was Fall from bed, initial encounter. Diagnoses of Nausea and vomiting, unspecified vomiting type, Severe sepsis (HCC), Hyponatremia, Acute pulmonary edema ([facility] [address] and Hypotension, unspecified hypotension type were also pertinent to this visit. History reviewed. No pertinent past medical history. Communication with other providers: CoTx with PT for pt safety and tolerance d/t high fall risk and body habitus, RN handoff     Subjective:  Patient states:  \"I am feeling better\"  Pain: denied   Restrictions: general, fall, tele, IV pole, O2 supp   No one at bedside    Objective:    Observation:  pt was seated EOB upon arrival, agreeable to session  Objective Measures:  95% SpO2 on 2L O2 supp     Treatment, including education:    Therapeutic Activity Training:   Therapeutic activity training was instructed today. Cues were given for safety, sequence, UE/LE placement, visual cues, and balance. Activities performed today included:    STS:  Pt completed from EOB to [address] with CGA and inc time to fully ascend. Pt completed to recliner with Valeriy for safe descent and cues for hand placement. Pt completed from recliner to [address] with CGA and inc time to fully ascend. Pt completed to recliner with Valeriy for safe eccentric control and cues for hand placement. Standing balance:  Pt demo 1 LOB at recliner with Valeriy to recover upon standing. Ambulation:  Pt ambulated 2 bouts of short HH distances CGA with FWW and provided rolling recliner follow with multiple medical lines to manage. Pt demo 1 bout of LOB with first bout of ambulation requiring Valeriy to recover. Pt benefited from prolonged seated rest break between bouts d/t fatigue. Pt returned to room with total assist via rolling recliner.  See PT note for further details regarding ambulation. Self Care Training:   Self care training was performed today. Cues were given for safety, sequence, UE/LE placement, visual cues, and balance. Activities performed today included:    LB dressing:  Pt donned bilat nonslip socks seated EOB with DEP assist. Pt donned new depends with ModA for threading BLE. Pt managed hike to knees and upon STS, pt managed full hike up. Feeding/eating:  Pt engaged seated in recliner with independence at conclusion of session. Education: Role of OT, OT POC, safety, benefits of EOB/OOB activity, rationale for treatment    Safety Measures: Gait belt used for safety of pt and therapist, Left in recliner with lunch, Alarm in place, call light and phone within reach, lines managed, needs met     Assessment / Impression:    Pt would benefit from sockaid and reacher trial to inc functional ADL independence. Cont to recommend ARU placement to address patient's functional impairments and safely return pt to typical independent baseline. Pt cont to be high fall risk at this time and unsafe to return home.      Patient's tolerance of treatment: Good   Adverse Reaction: none   Significant change in status and impact:  improved functional mobility   Barriers to improvement: strength, endurance     Plan for Next Session:    Continue OT POC    Time in:  1142  Time out:  1202  Timed treatment minutes:  15  Total treatment time:  20    Electronically signed by:    ISMAEL Vicente/L  License: [id number]  5/79/1133, 1:18 PM

## 2023-02-15 NOTE — CARE COORDINATION
Received denial for admission to ARU. Notified CM that there is an option for an appeal if MD wishes to pursue. Per CM he'll talk to MD and patient.

## 2023-02-15 NOTE — PROGRESS NOTES
V2.0  [facility]ist Progress Note      Name:  Cody Singh /Age/Sex: 1965  (62 y.o. female)   MRN & CSN:  [id number] & [id number] Encounter Date/Time: 2023 11:42 AM EST    Location:  [address] [place] [address]-A PCP: No primary care provider on file. Hospital Day: 3    Assessment and Plan:   Cody Singh is a 62 y.o. female with pmh of hypertension, diabetes mellitus, history of diastolic heart failure, SAAD, COPD, obesity hypoventilation syndrome and chronic respiratory failure on 2 L of O2 who presents with history of found on the floor with Severe sepsis without septic shock ([address] and rhabdomyolysis. RAMON w/ ATN due to Rhabdomyolysis and underlying severe sepsis  Continue IVFs   Trend CK  Avoid nephrotoxic agents and renally dose medications   Nephrology following    Severe sepsis of unknown etiology  MSSA Bacteremia  Unlikely pneumonia as the source, feel like poor dentition w/ multiple caries could be the source. No clinical evidence of meningitis. It can occur wo apparent focus. Continue Vancomycin, will switch to nafcillin if final cultures remain MSSA  JONI with no evidence of vegetation    # Fall from bed: CT head, CT C-spine no acute process, patient lives alone at home and she is on disability, PT/OT evaluation when appropriate. # Hypertension: On Lasix 40 mg daily, Coreg 6.25 mg daily hold    # Type 2 diabetes mellitus on metformin 1 g twice daily, glipizide 10 mg daily, hold oral diabetic's, start on sliding scale insulin, hypoglycemic protocol, diabetic diet and obtain A1c    #History of chronic diastolic heart failure, patient was on Lasix will hold and obtain echocardiogram    #History of pulmonary hypertension  #Obesity hypoventilation syndrome  #Sleep apnea  #Morbid obesity  #Pulmonary hypertension  #Chronic respiratory failure on 2 L of O2    Diet ADULT DIET;  Regular; 5 carb choices (75 gm/meal)   DVT Prophylaxis [] Lovenox, [x]  Heparin, [] SCDs, [] Ambulation,  [] Eliquis, [] Xarelto  [] Coumadin   Code Status Full Code   Disposition From: Home  Expected Disposition: Home versus SNF  Estimated Date of Discharge: TBD  Patient requires continued admission due to sepsis/rhabdomyolysis, RAMON   Surrogate Decision Maker/ POA Daughter     Subjective:     Chief Complaint: Fall (\"Slide out of bed. \" Pt found down by family) and Emesis (Since this morning)       Patient seen and examined at bedside. Patient denies any complaints, denies any fever, chills, nausea, vomiting, chest pain, shortness of breath, abdominal pain, diarrhea, constipation, urinary symptoms. Review of Systems:    Review of Systems    As above    Objective: Intake/Output Summary (Last 24 hours) at 2/14/2023 2118  Last data filed at 2/14/2023 1945  Gross per 24 hour   Intake --   Output 2125 ml   Net -2125 ml          Vitals:   Vitals:    02/14/23 2111   BP: 116/78   Pulse: 86   Resp: (!) 37   Temp: 97.8 °F (36.6 °C)   SpO2: 98%       Physical Exam:     General: Febrile Tmax 102.9, on 2 L of O2  Eyes: EOMI, mucous membrane pink/dry  ENT: neck supple, no JVD  Cardiovascular: Regular rate.,  Tachycardic  Respiratory: Clear to auscultation no wheezing no crackles  Gastrointestinal: Soft, non tender bowel sounds normal  Genitourinary: no suprapubic tenderness  Musculoskeletal: No edema  Skin: warm, dry  Neuro: Alert. Oriented no focal deficit  Psych: Mood appropriate.      Medications:   Medications:    furosemide  40 mg Oral Daily    carvedilol  6.25 mg Oral BID WC    vancomycin (VANCOCIN) intermittent dosing (placeholder)   Other RX Placeholder    insulin glargine  5 Units SubCUTAneous Daily with breakfast    heparin (porcine)  5,000 Units SubCUTAneous 3 times per day    sodium chloride flush  5-40 mL IntraVENous 2 times per day    insulin lispro  0-8 Units SubCUTAneous TID WC    insulin lispro  0-4 Units SubCUTAneous Nightly      Infusions:    sodium bicarbonate infusion 75 mL/hr at 02/14/23 1316    sodium chloride dextrose       PRN Meds: ondansetron, 4 mg, Q6H PRN  sodium chloride flush, 5-40 mL, PRN  sodium chloride, , PRN  acetaminophen, 650 mg, Q6H PRN   Or  acetaminophen, 650 mg, Q6H PRN  glucose, 4 tablet, PRN  dextrose bolus, 125 mL, PRN   Or  dextrose bolus, 250 mL, PRN  glucagon (rDNA), 1 mg, PRN  dextrose, , Continuous PRN      Labs      Recent Results (from the past 24 hour(s))   Brain Natriuretic Peptide    Collection Time: 02/14/23 12:44 AM   Result Value Ref Range    Pro-BNP 1,415 (H) <300 PG/ML   Hemoglobin A1C    Collection Time: 02/14/23 12:44 AM   Result Value Ref Range    Hemoglobin A1C 9.5 (H) 4.2 - 6.3 %    eAG 226 mg/dL   Lactic Acid    Collection Time: 02/14/23 12:44 AM   Result Value Ref Range    Lactate 1.3 0.5 - 1.9 mMOL/L   Vancomycin Level, Random    Collection Time: 02/14/23 12:44 AM   Result Value Ref Range    Vancomycin Rm 19.6 UG/ML    DOSE AMOUNT DOSE AMT.  GIVEN - 1,500 MG     DOSE TIME DOSE TIME GIVEN - 1800    Procalcitonin    Collection Time: 02/14/23 12:44 AM   Result Value Ref Range    Procalcitonin 12.96    C-Reactive Protein    Collection Time: 02/14/23 12:44 AM   Result Value Ref Range    CRP High Sensitivity 246.8 (H) <5.0 mg/L   CBC with Auto Differential    Collection Time: 02/14/23 12:44 AM   Result Value Ref Range    WBC 5.9 4.0 - 10.5 K/CU MM    RBC 3.92 (L) 4.2 - 5.4 M/CU MM    Hemoglobin 10.7 (L) 12.5 - 16.0 GM/DL    Hematocrit 33.5 (L) 37 - 47 %    MCV 85.5 78 - 100 FL    MCH 27.3 27 - 31 PG    MCHC 31.9 (L) 32.0 - 36.0 %    RDW 13.7 11.7 - 14.9 %    Platelets 664 620 - 097 K/CU MM    Differential Type AUTOMATED DIFFERENTIAL     Segs Relative 81.9 (H) 36 - 66 %    Lymphocytes % 8.9 (L) 24 - 44 %    Monocytes % 7.9 (H) 0 - 4 %    Eosinophils % 0.0 0 - 3 %    Basophils % 0.3 0 - 1 %    Segs Absolute 4.8 K/CU MM    Lymphocytes Absolute 0.5 K/CU MM    Monocytes Absolute 0.5 K/CU MM    Eosinophils Absolute 0.0 K/CU MM    Basophils Absolute 0.0 K/CU MM    Nucleated RBC % 0.0 % Total Nucleated RBC 0.0 K/CU MM    Total Immature Neutrophil 0.06 K/CU MM    Immature Neutrophil % 1.0 (H) 0 - 0.43 %   Comprehensive Metabolic Panel    Collection Time: 02/14/23 12:44 AM   Result Value Ref Range    Sodium 129 (L) 135 - 145 MMOL/L    Potassium 3.6 3.5 - 5.1 MMOL/L    Chloride 94 (L) 99 - 110 mMol/L    CO2 22 21 - 32 MMOL/L    BUN 30 (H) 6 - 23 MG/DL    Creatinine 2.0 (H) 0.6 - 1.1 MG/DL    Est, Glom Filt Rate 29 (L) >60 mL/min/1.73m2    Glucose 270 (H) 70 - 99 MG/DL    Calcium 7.4 (L) 8.3 - 10.6 MG/DL    Albumin 3.1 (L) 3.4 - 5.0 GM/DL    Total Protein 5.3 (L) 6.4 - 8.2 GM/DL    Total Bilirubin 0.6 0.0 - 1.0 MG/DL    ALT 30 10 - 40 U/L     (H) 15 - 37 IU/L    Alkaline Phosphatase 69 40 - 128 IU/L    Anion Gap 13 4 - 16   Magnesium    Collection Time: 02/14/23 12:44 AM   Result Value Ref Range    Magnesium 1.8 1.8 - 2.4 mg/dl   CK    Collection Time: 02/14/23 12:44 AM   Result Value Ref Range    Total CK 13,076 (H) 26 - 140 IU/L   Lactic Acid    Collection Time: 02/14/23  5:29 AM   Result Value Ref Range    Lactate 1.6 0.5 - 1.9 mMOL/L   POCT Glucose    Collection Time: 02/14/23 12:51 PM   Result Value Ref Range    POC Glucose 237 (H) 70 - 99 MG/DL   POCT Glucose    Collection Time: 02/14/23  6:10 PM   Result Value Ref Range    POC Glucose 346 (H) 70 - 99 MG/DL   POCT Glucose    Collection Time: 02/14/23  8:50 PM   Result Value Ref Range    POC Glucose 304 (H) 70 - 99 MG/DL        Imaging/Diagnostics Last 24 Hours   CT HEAD WO CONTRAST    Result Date: 2/12/2023  EXAMINATION: CT OF THE HEAD WITHOUT CONTRAST  2/12/2023 4:25 pm TECHNIQUE: CT of the head was performed without the administration of intravenous contrast. Automated exposure control, iterative reconstruction, and/or weight based adjustment of the mA/kV was utilized to reduce the radiation dose to as low as reasonably achievable. COMPARISON: None.  HISTORY: ORDERING SYSTEM PROVIDED HISTORY: Fall, hit head TECHNOLOGIST PROVIDED HISTORY: Has a \"code stroke\" or \"stroke alert\" been called? ->No Reason for exam:->Fall, hit head Decision Support Exception - unselect if not a suspected or confirmed emergency medical condition->Emergency Medical Condition (MA) Reason for Exam: Fall, hit head FINDINGS: BRAIN/VENTRICLES: There is no acute intracranial hemorrhage, mass effect or midline shift. No abnormal extra-axial fluid collection. The gray-white differentiation is maintained without evidence of an acute infarct. There is no evidence of hydrocephalus. ORBITS: The visualized portion of the orbits demonstrate no acute abnormality. SINUSES: The visualized paranasal sinuses and mastoid air cells demonstrate no acute abnormality. SOFT TISSUES/SKULL:  No acute abnormality of the visualized skull or soft tissues. No acute intracranial abnormality. CT CERVICAL SPINE WO CONTRAST    Result Date: 2/12/2023  EXAMINATION: CT OF THE CERVICAL SPINE WITHOUT CONTRAST 2/12/2023 4:25 pm TECHNIQUE: CT of the cervical spine was performed without the administration of intravenous contrast. Multiplanar reformatted images are provided for review. Automated exposure control, iterative reconstruction, and/or weight based adjustment of the mA/kV was utilized to reduce the radiation dose to as low as reasonably achievable. COMPARISON: None. HISTORY: ORDERING SYSTEM PROVIDED HISTORY: Fall from bed TECHNOLOGIST PROVIDED HISTORY: Reason for exam:->Fall from bed Decision Support Exception - unselect if not a suspected or confirmed emergency medical condition->Emergency Medical Condition (MA) Reason for Exam: FALL, NECK PAIN FINDINGS: BONES/ALIGNMENT: There is no acute fracture or traumatic malalignment. DEGENERATIVE CHANGES: Calcification of the posterior longitudinal ligament at C3-C4, which causes eccentric narrowing of the spinal canal. SOFT TISSUES: There is no prevertebral soft tissue swelling. No acute abnormality of the cervical spine.      XR CHEST PORTABLE    Result Date: 2/12/2023  EXAMINATION: ONE XRAY VIEW OF THE CHEST 2/12/2023 5:54 pm COMPARISON: Chest x-ray September 19, 2008 HISTORY: ORDERING SYSTEM PROVIDED HISTORY: Fall TECHNOLOGIST PROVIDED HISTORY: Reason for exam:->Fall Reason for Exam: fall FINDINGS: Cardiac silhouette is enlarged but grossly stable given changes in technique when compared with prior exam.  Central pulmonary vascular congestion and mild interstitial opacities favored to reflect changes related to pulmonary edema. No large effusion. No pneumothorax. 1. Cardiomegaly and findings favored to reflect pulmonary edema. CT CHEST ABDOMEN PELVIS W CONTRAST Additional Contrast? None    Result Date: 2/13/2023  EXAMINATION: CT OF THE CHEST, ABDOMEN, AND PELVIS WITH CONTRAST 2/13/2023 2:36 am TECHNIQUE: CT of the chest, abdomen and pelvis was performed with the administration of intravenous contrast. Multiplanar reformatted images are provided for review. Automated exposure control, iterative reconstruction, and/or weight based adjustment of the mA/kV was utilized to reduce the radiation dose to as low as reasonably achievable. COMPARISON: None HISTORY: ORDERING SYSTEM PROVIDED HISTORY: Shortness of breath, sepsis TECHNOLOGIST PROVIDED HISTORY: Reason for exam:->Shortness of breath, sepsis Additional Contrast?->None Decision Support Exception - unselect if not a suspected or confirmed emergency medical condition->Emergency Medical Condition (MA) Reason for Exam: Shortness of breath, sepsis, emesis FINDINGS: Chest: Mediastinum: Cardiac chambers are not enlarged and no significant pericardial effusion. Mediastinal lymph nodes are not enlarged. Thoracic aorta is not aneurysmal.  Bolus timing does not allow for evaluation of the pulmonary arterial system. Lungs/pleura: Linear bands of atelectasis/scarring in the right lower lobe and some calcified granulomas. No focal consolidation or diffuse opacities are seen to indicate pneumonia. Noncalcified 3 mm nodule in the left upper lobe on series 304 image number 66 there is no pleural effusion or pneumothorax. Soft Tissues/Bones: No acute or displaced fractures are seen at the ribs. There is no soft tissue emphysema in the chest wall. Degenerative changes anterior longitudinal ligament ossification in the mid to lower thoracic spine. No acute fracture or subluxation is demonstrated at the spine. Abdomen/Pelvis: Organs: Liver is enlarged and a small portion is cut off along the right lateral aspect. Otherwise the liver surface is smooth. The biliary system is not dilated. Multiple calcified gallstones are present in the gallbladder without surrounding fat stranding at the gallbladder fossa. The spleen is not enlarged. There is fatty atrophy of the pancreas with limited amounts of parenchyma remaining. Adrenal glands are unremarkable. Mild perinephric stranding is symmetric and may relate to chronic renal disease. There is no acute enhancement abnormality, hydronephrosis, or hydroureter. GI/Bowel: Small portion of the ascending colon is cut off along the right lateral aspect of the abdomen. There is constipation in the distal colon and rectum. Scattered diverticulosis but without signs of acute diverticulitis. Small bowel and colon are not dilated. Some gastritis is suspected at the stomach. There is no ascites or pneumoperitoneum. Pelvis: The urinary bladder wall is not significantly thickened. The uterus is not enlarged. No free fluid in the pelvis. Peritoneum/Retroperitoneum: No abdominal aortic aneurysm, retroperitoneal hematoma, or bulky adenopathy. Bones/Soft Tissues: The right lateral abdominal wall is cut off the exam. Degenerative changes along the spine without acute fracture deformity or subluxation. Also has degenerative changes in the sacroiliac joints and hips with joint space narrowing and marginal sclerosis.      1.  Linear bands of atelectasis/scarring in the right lower chest and some scattered calcified granulomas without signs of pneumonia. 2.  A noncalcified 3 mm nodule in the left upper lobe would not require further workup in a low risk patient. 3.  The liver is enlarged and portion of the right lateral abdomen including the edge of the liver is cut off the edge of the exam.  No apparent mass or nodularity within the liver. 4.  There is cholelithiasis but without fat stranding around the gallbladder fossa. 5.  No bowel obstruction, pneumoperitoneum, or sign of diverticulitis. Some gastritis in the stomach is suspected. 6.  Some stranding around both kidneys appear symmetric and may relate to chronic renal disease. There is no hydronephrosis or hydroureter. 7.  Fatty atrophy of the pancreas.        Electronically signed by Juve Gatica MD on 2/14/2023 at 9:18 PM

## 2023-02-15 NOTE — PROGRESS NOTES
Physical Therapy Treatment Note  Name: Chad Lamar MRN: [id number] :   1965   Date:  2/15/2023   Admission Date: 2023 Room:  [address] [place], [place] [address]     Restrictions/Precautions:          general precautions, fall risk    Communication with other providers:  RN, OT    Subjective:  Patient states:  \"I knew you were coming so I was ready! \"  Pain:   Location, Type, Intensity (0/10 to 10/10):  denies pain    Objective:    Observation:  pt sitting EOB upon entry and agreeable to session    Treatment, including education/measures:    Transfers: pt completed STS from EOB CGA, to chair x2 trials min A, and from chair x1 trial CGA. Cues provided for hand placement and controlled descent. Balance: Pt stood during pericare and at chair with RW x2 bouts CGA with one LOB backward min A. Cues provided for upright posture and hand placement    Gait: pt ambulated 25' + 25' with RW CGA with decreased jennifer, decreased step length and height bilaterally, and chair follow. Cues provided for walker management and pursed Iip breathing. Pt with one LOB requiring min A to recover. Rest break provided in between bouts, SpO2 95%    Assessment / Impression:    Pt up in chair at end of session with needs in reach and alarm on.  Still strongly recommend ARU upon discharge to continue to address impairments and decrease pt's fall risk    Patient's tolerance of treatment:  well   Adverse Reaction: n/a  Significant change in status and impact:  n/a  Barriers to improvement:  decreased endurance, impaired balance, impaired gait    Plan for Next Session:    Continue to address transfer training, gait training, and balance in future sessions    Time in:  1142  Time out:  1207  Timed treatment minutes:  25  Total treatment time:  25    Previously filed items:  Social/Functional History  Lives With: Alone  Type of Home: Apartment  Home Access: Level entry  Bathroom Shower/Tub: Tub/Shower unit  Bathroom Toilet: Standard  Has the patient had two or more falls in the past year or any fall with injury in the past year?: Yes  Receives Help From: Family  ADL Assistance: [address]: Independent  Homemaking Responsibilities: Yes  Ambulation Assistance: Independent  Transfer Assistance: Independent  Active : Yes  Occupation: On disability        Short Term Goals  Time Frame for Short Term Goals: 1 week  Short Term Goal 1: pt to complete all bed mobility min A x1  Short Term Goal 2: Pt to sit EOB 10 minutes SBA with no LOB  Short Term Goal 3: Pt to complete STS transfers to/from bed, commode, and chair mod A  Short Term Goal 4: Pt to ambulate 25' with LRAD mod A    Electronically signed by:    Verónica Marquez PT  2/15/2023, 1:26 PM

## 2023-02-15 NOTE — PROGRESS NOTES
Nephrology Progress Note        [address], [address] [address]  Phone: [phone]  Office Hours: 8:30AM - 4:30PM  Monday - Friday        2/15/2023 7:08 AM  Subjective:   Admit Date: 2/12/2023  PCP: No primary care provider on file. Interval History: on nc  Flat in bed    Diet: ADULT DIET; Regular; 5 carb choices (75 gm/meal)      Data:   Scheduled Meds:   furosemide  40 mg Oral Daily    carvedilol  6.25 mg Oral BID WC    vancomycin (VANCOCIN) intermittent dosing (placeholder)   Other RX Placeholder    insulin glargine  5 Units SubCUTAneous Daily with breakfast    heparin (porcine)  5,000 Units SubCUTAneous 3 times per day    sodium chloride flush  5-40 mL IntraVENous 2 times per day    insulin lispro  0-8 Units SubCUTAneous TID WC    insulin lispro  0-4 Units SubCUTAneous Nightly     Continuous Infusions:   sodium bicarbonate infusion 75 mL/hr at 02/14/23 2308    sodium chloride      dextrose       PRN Meds:ondansetron, sodium chloride flush, sodium chloride, acetaminophen **OR** acetaminophen, glucose, dextrose bolus **OR** dextrose bolus, glucagon (rDNA), dextrose  I/O last 3 completed shifts:  In: -   Out: 2125 [Urine:2125]  No intake/output data recorded.     Intake/Output Summary (Last 24 hours) at 2/15/2023 0708  Last data filed at 2/14/2023 1945  Gross per 24 hour   Intake --   Output 2125 ml   Net -2125 ml       CBC:   Recent Labs     02/12/23 1640 02/13/23 0046 02/14/23  0044 02/15/23  0614   WBC 8.7 8.2 5.9 5.5   HGB 12.9 12.4* 10.7* 9.7*   * 113* 140  --        BMP:    Recent Labs     02/12/23 1640 02/13/23 0046 02/14/23  0044   * 134* 129*   K 4.0 3.8 3.6   CL 90* 94* 94*   CO2 24 24 22   BUN 17 25* 30*   CREATININE 0.8 1.3* 2.0*   GLUCOSE 339* 306* 270*     Hepatic:   Recent Labs     02/12/23 1640 02/13/23 0046 02/14/23  0044   * 224* 215*   ALT 21 28 30   BILITOT 1.2* 1.1* 0.6   ALKPHOS 89 79 69         Objective:   Vitals: /73   Pulse 73 Temp 97.2 °F (36.2 °C) (Oral)   Resp 21   Ht 5' 5\" (1.651 m)   Wt (!) 305 lb (138.3 kg)   SpO2 97%   BMI 50.75 kg/m²   General appearance: alert and cooperative with exam, in no acute distress  HEENT: normocephalic, atraumatic,   Neck: supple, trachea midline  Lungs: breathing comfortably on nc  Heart[de-identified] regular rate and rhythm, S1, S2 normal,  Extremities: extremities atraumatic, no cyanosis or edema  Neurologic: alert, oriented, follows commands, interactive    MEDICAL DECISION MAKING       -RAMON from ATN in setting of volume depletion and rhabdo; cr 1.3 from normal baseline//no HN on CT AP//UA without rbcs or wbcs  -Hyponatremia  -Staph bacteremia  -Proteinuria on UA; recheck once RAMON resolves  -Transaminitis  -Rhabdo s/p being found on the floor ; TSH and phos wnl  -CHF hx  -DM2     Suggest:  -Obtain BMP and CK level  -Continue gentle bicarb drip for now  -Continue po lasix  -Avoid nephrotoxins              Electronically signed by Terrance Martinez DO on 2/15/2023 at 7:08 AM    [address] [place] ,  [address]  [place], [place] [address]  PHONE: [phone]  FAX: [phone]

## 2023-02-15 NOTE — PROGRESS NOTES
[facility]  consulted by Dr. Gabby Jacinto for monitoring and adjustment. Indication for treatment: Sepsis; Bacteremia (Staph aureus, sensitivities pending)  Goal trough: Trough Goal: 15-20 mcg/mL  AUC/ALEJANDRO: 400-600    Risk Factors for MRSA Identified:   Positive blood cultures    Pertinent Laboratory Values:   Temp Readings from Last 3 Encounters:   02/15/23 97.2 °F (36.2 °C) (Oral)     Recent Labs     02/13/23 0046 02/14/23 0044 02/14/23  0529 02/15/23  0614   WBC 8.2 5.9  --  5.5   LACTATE  --  1.3 1.6  --      Recent Labs     02/13/23  0046 02/14/23  0044 02/15/23  0614   BUN 25* 30* 28*   CREATININE 1.3* 2.0* 1.7*     Estimated Creatinine Clearance: 52 mL/min (A) (based on SCr of 1.7 mg/dL (H)). Intake/Output Summary (Last 24 hours) at 2/15/2023 0826  Last data filed at 2/14/2023 1945  Gross per 24 hour   Intake --   Output 2125 ml   Net -2125 ml       Pertinent Cultures:   Date    Source    Results  02/12   Blood    Staph aureus, sensitivities pending  02/12   Rapid Influenza, A/B  Negative  02/12   COVID-19, Rapid  Not detected  02/12   Urine    Negative  02/13   MRSA Screen (Nasal)  Pending    Assessment:  HPI: 62 y.o. female with history of HTN, DM-2, chronic diastolic congestive heart failure, pulmonary hypertension, obesity hypoventilation syndrome, and morbid obesity. Patient presents to ED with severe sepsis without septic shock. At presentation patient was noted to have /91, , RR 22, temp 100.2, saturating 98% on room air. Later patient was noted to be hypotensive, BP down to 86/67. ID following, plan to continue vancomycin until susceptibility available. Sensitivities still pending.    Renal trends:   RAMON, SCr worsening with total CK elevated  CK trends slightly improved today  SCr @ 0.8 on admission  Day(s) of therapy: 4 of 7  Vancomycin concentration:   02/14 - 19.6, collected 8h post-dose,   02/15 - 6.2    Plan:  Restart vancomycin at reduced dose of 1250mg IVPB every 24 hours  Pharmacy will continue to monitor patient and adjust therapy as indicated    Barron 3 2/17 @ 0600    Thank you for the consult,  Sharath Awad [facility], PharmD  2/15/2023 8:26 AM

## 2023-02-15 NOTE — CARE COORDINATION
CM in to see Pt to follow up on discharge planning. Discharge plan now home with home care, choice of [facility]. Pt denies any needs at this time.      CM following

## 2023-02-15 NOTE — PROGRESS NOTES
V2.0  [facility] [place] [facility] Progress Note      Name:  Donal Boucher /Age/Sex: 1965  (62 y.o. female)   MRN & CSN:  [id number] & [id number] Encounter Date/Time: 2023 11:42 AM EST    [facility]:  [facility] PCP: No primary care provider on file. Hospital Day: 5    Assessment and Plan:   Donal Boucher is a 62 y.o. female with pmh of hypertension, diabetes mellitus, history of diastolic heart failure, SAAD, COPD, obesity hypoventilation syndrome and chronic respiratory failure on 2 L of O2 who presents with history of found on the floor with Severe sepsis without septic shock ([address] and rhabdomyolysis. RAMON w/ ATN due to Rhabdomyolysis and underlying severe sepsis  Improving kidney function  Continue IVFs   Trend CK  Avoid nephrotoxic agents and renally dose medications   Nephrology following    Severe sepsis of unknown etiology  MSSA Bacteremia  Unlikely pneumonia as the source, feel like poor dentition w/ multiple caries could be the source. No clinical evidence of meningitis. It can occur wo apparent focus. Repeat Bcx still positive 4/4 Staph species  Continue Vancomycin, will switch to nafcillin if final cultures remain MSSA  JONI with no evidence of vegetation  Repeat blood cultures for tomorrow    # Fall from bed: CT head, CT C-spine no acute process, patient lives alone at home and she is on disability, PT/OT evaluation when appropriate. # Hypertension: On Lasix 40 mg daily, Coreg 6.25 mg daily hold    # Type 2 diabetes mellitus on metformin 1 g twice daily, glipizide 10 mg daily, hold oral diabetic's, start on sliding scale insulin, hypoglycemic protocol, diabetic diet and obtain A1c    #History of chronic diastolic heart failure, patient was on Lasix will hold and obtain echocardiogram    #History of pulmonary hypertension  #Obesity hypoventilation syndrome  #Sleep apnea  #Morbid obesity  #Pulmonary hypertension  #Chronic respiratory failure on 2 L of O2    Diet ADULT DIET;  Regular; 5 carb choices (75 gm/meal); 2000 ml   DVT Prophylaxis [] Lovenox, [x]  Heparin, [] SCDs, [] Ambulation,  [] Eliquis, [] Xarelto  [] Coumadin   Code Status Full Code   Disposition From: Home  Expected Disposition: Home versus SNF  Estimated Date of Discharge: TBD  Patient requires continued admission due to sepsis/rhabdomyolysis, RAMON   Surrogate Decision Maker/ POA Daughter     Subjective:     Chief Complaint: Fall (\"Slide out of bed. \" Pt found down by family) and Emesis (Since this morning)       Patient seen and examined at bedside. Patient denies any complaints, denies any fever, chills, nausea, vomiting, chest pain, shortness of breath, abdominal pain, diarrhea, constipation, urinary symptoms. Review of Systems:    Review of Systems    As above    Objective: Intake/Output Summary (Last 24 hours) at 2/16/2023 1837  Last data filed at 2/16/2023 1657  Gross per 24 hour   Intake 1632.14 ml   Output 2600 ml   Net -967.86 ml        Vitals:   Vitals:    02/16/23 1654   BP: (!) 102/59   Pulse: 76   Resp: 27   Temp: 98.9 °F (37.2 °C)   SpO2:        Physical Exam:     General: 2 L of O2  Eyes: EOMI, mucous membrane pink/dry  ENT: neck supple, no JVD  Cardiovascular: Regular rate  Respiratory: Clear to auscultation no wheezing no crackles  Gastrointestinal: Soft, non tender bowel sounds normal  Genitourinary: no suprapubic tenderness  Musculoskeletal: No edema  Skin: warm, dry  Neuro: Alert. Oriented no focal deficit  Psych: Mood appropriate.      Medications:   Medications:    ketotifen  1 drop Right Eye 4x Daily    insulin glargine  15 Units SubCUTAneous Daily with breakfast    insulin lispro  5 Units SubCUTAneous TID WC    nafcillin 12,000 mg in sodium chloride 0.9% 500 mL infusion  12,000 mg IntraVENous Q24H    furosemide  40 mg Oral Daily    carvedilol  6.25 mg Oral BID WC    heparin (porcine)  5,000 Units SubCUTAneous 3 times per day    sodium chloride flush  5-40 mL IntraVENous 2 times per day    insulin lispro  0-8 Units SubCUTAneous TID     insulin lispro  0-4 Units SubCUTAneous Nightly      Infusions:    sodium chloride      dextrose       PRN Meds: ondansetron, 4 mg, Q6H PRN  sodium chloride flush, 5-40 mL, PRN  sodium chloride, , PRN  acetaminophen, 650 mg, Q6H PRN   Or  acetaminophen, 650 mg, Q6H PRN  glucose, 4 tablet, PRN  dextrose bolus, 125 mL, PRN   Or  dextrose bolus, 250 mL, PRN  glucagon (rDNA), 1 mg, PRN  dextrose, , Continuous PRN      Labs      Recent Results (from the past 24 hour(s))   POCT Glucose    Collection Time: 02/15/23  8:38 PM   Result Value Ref Range    POC Glucose 282 (H) 70 - 99 MG/DL   C-Reactive Protein    Collection Time: 02/16/23  6:57 AM   Result Value Ref Range    CRP High Sensitivity 128.2 (H) <5.0 mg/L   CK    Collection Time: 02/16/23  7:42 AM   Result Value Ref Range    Total CK 3,053 (H) 26 - 140 IU/L   Basic Metabolic Panel    Collection Time: 02/16/23  7:42 AM   Result Value Ref Range    Sodium 136 135 - 145 MMOL/L    Potassium 3.4 (L) 3.5 - 5.1 MMOL/L    Chloride 96 (L) 99 - 110 mMol/L    CO2 28 21 - 32 MMOL/L    Anion Gap 12 4 - 16    BUN 29 (H) 6 - 23 MG/DL    Creatinine 1.6 (H) 0.6 - 1.1 MG/DL    Est, Glom Filt Rate 37 (L) >60 mL/min/1.73m2    Glucose 311 (H) 70 - 99 MG/DL    Calcium 7.5 (L) 8.3 - 10.6 MG/DL   POCT Glucose    Collection Time: 02/16/23  8:48 AM   Result Value Ref Range    POC Glucose 371 (H) 70 - 99 MG/DL   POCT Glucose    Collection Time: 02/16/23 12:11 PM   Result Value Ref Range    POC Glucose 301 (H) 70 - 99 MG/DL   POCT Glucose    Collection Time: 02/16/23  4:51 PM   Result Value Ref Range    POC Glucose 271 (H) 70 - 99 MG/DL        Imaging/Diagnostics Last 24 Hours   CT HEAD WO CONTRAST    Result Date: 2/12/2023  EXAMINATION: CT OF THE HEAD WITHOUT CONTRAST  2/12/2023 4:25 pm TECHNIQUE: CT of the head was performed without the administration of intravenous contrast. Automated exposure control, iterative reconstruction, and/or weight based adjustment of the mA/kV was utilized to reduce the radiation dose to as low as reasonably achievable. COMPARISON: None. HISTORY: ORDERING SYSTEM PROVIDED HISTORY: Fall, hit head TECHNOLOGIST PROVIDED HISTORY: Has a \"code stroke\" or \"stroke alert\" been called? ->No Reason for exam:->Fall, hit head Decision Support Exception - unselect if not a suspected or confirmed emergency medical condition->Emergency Medical Condition (MA) Reason for Exam: Fall, hit head FINDINGS: BRAIN/VENTRICLES: There is no acute intracranial hemorrhage, mass effect or midline shift. No abnormal extra-axial fluid collection. The gray-white differentiation is maintained without evidence of an acute infarct. There is no evidence of hydrocephalus. ORBITS: The visualized portion of the orbits demonstrate no acute abnormality. SINUSES: The visualized paranasal sinuses and mastoid air cells demonstrate no acute abnormality. SOFT TISSUES/SKULL:  No acute abnormality of the visualized skull or soft tissues. No acute intracranial abnormality. CT CERVICAL SPINE WO CONTRAST    Result Date: 2/12/2023  EXAMINATION: CT OF THE CERVICAL SPINE WITHOUT CONTRAST 2/12/2023 4:25 pm TECHNIQUE: CT of the cervical spine was performed without the administration of intravenous contrast. Multiplanar reformatted images are provided for review. Automated exposure control, iterative reconstruction, and/or weight based adjustment of the mA/kV was utilized to reduce the radiation dose to as low as reasonably achievable. COMPARISON: None. HISTORY: ORDERING SYSTEM PROVIDED HISTORY: Fall from bed TECHNOLOGIST PROVIDED HISTORY: Reason for exam:->Fall from bed Decision Support Exception - unselect if not a suspected or confirmed emergency medical condition->Emergency Medical Condition (MA) Reason for Exam: FALL, NECK PAIN FINDINGS: BONES/ALIGNMENT: There is no acute fracture or traumatic malalignment.  DEGENERATIVE CHANGES: Calcification of the posterior longitudinal ligament at C3-C4, which causes eccentric narrowing of the spinal canal. SOFT TISSUES: There is no prevertebral soft tissue swelling. No acute abnormality of the cervical spine. XR CHEST PORTABLE    Result Date: 2/12/2023  EXAMINATION: ONE XRAY VIEW OF THE CHEST 2/12/2023 5:54 pm COMPARISON: Chest x-ray September 19, 2008 HISTORY: ORDERING SYSTEM PROVIDED HISTORY: Fall TECHNOLOGIST PROVIDED HISTORY: Reason for exam:->Fall Reason for Exam: fall FINDINGS: Cardiac silhouette is enlarged but grossly stable given changes in technique when compared with prior exam.  Central pulmonary vascular congestion and mild interstitial opacities favored to reflect changes related to pulmonary edema. No large effusion. No pneumothorax. 1. Cardiomegaly and findings favored to reflect pulmonary edema. CT CHEST ABDOMEN PELVIS W CONTRAST Additional Contrast? None    Result Date: 2/13/2023  EXAMINATION: CT OF THE CHEST, ABDOMEN, AND PELVIS WITH CONTRAST 2/13/2023 2:36 am TECHNIQUE: CT of the chest, abdomen and pelvis was performed with the administration of intravenous contrast. Multiplanar reformatted images are provided for review. Automated exposure control, iterative reconstruction, and/or weight based adjustment of the mA/kV was utilized to reduce the radiation dose to as low as reasonably achievable. COMPARISON: None HISTORY: ORDERING SYSTEM PROVIDED HISTORY: Shortness of breath, sepsis TECHNOLOGIST PROVIDED HISTORY: Reason for exam:->Shortness of breath, sepsis Additional Contrast?->None Decision Support Exception - unselect if not a suspected or confirmed emergency medical condition->Emergency Medical Condition (MA) Reason for Exam: Shortness of breath, sepsis, emesis FINDINGS: Chest: Mediastinum: Cardiac chambers are not enlarged and no significant pericardial effusion. Mediastinal lymph nodes are not enlarged.   Thoracic aorta is not aneurysmal.  Bolus timing does not allow for evaluation of the pulmonary arterial system. Lungs/pleura: Linear bands of atelectasis/scarring in the right lower lobe and some calcified granulomas. No focal consolidation or diffuse opacities are seen to indicate pneumonia. Noncalcified 3 mm nodule in the left upper lobe on series 304 image number 66 there is no pleural effusion or pneumothorax. Soft Tissues/Bones: No acute or displaced fractures are seen at the ribs. There is no soft tissue emphysema in the chest wall. Degenerative changes anterior longitudinal ligament ossification in the mid to lower thoracic spine. No acute fracture or subluxation is demonstrated at the spine. Abdomen/Pelvis: Organs: Liver is enlarged and a small portion is cut off along the right lateral aspect. Otherwise the liver surface is smooth. The biliary system is not dilated. Multiple calcified gallstones are present in the gallbladder without surrounding fat stranding at the gallbladder fossa. The spleen is not enlarged. There is fatty atrophy of the pancreas with limited amounts of parenchyma remaining. Adrenal glands are unremarkable. Mild perinephric stranding is symmetric and may relate to chronic renal disease. There is no acute enhancement abnormality, hydronephrosis, or hydroureter. GI/Bowel: Small portion of the ascending colon is cut off along the right lateral aspect of the abdomen. There is constipation in the distal colon and rectum. Scattered diverticulosis but without signs of acute diverticulitis. Small bowel and colon are not dilated. Some gastritis is suspected at the stomach. There is no ascites or pneumoperitoneum. Pelvis: The urinary bladder wall is not significantly thickened. The uterus is not enlarged. No free fluid in the pelvis. Peritoneum/Retroperitoneum: No abdominal aortic aneurysm, retroperitoneal hematoma, or bulky adenopathy. Bones/Soft Tissues:  The right lateral abdominal wall is cut off the exam. Degenerative changes along the spine without acute fracture deformity or subluxation. Also has degenerative changes in the sacroiliac joints and hips with joint space narrowing and marginal sclerosis. 1.  Linear bands of atelectasis/scarring in the right lower chest and some scattered calcified granulomas without signs of pneumonia. 2.  A noncalcified 3 mm nodule in the left upper lobe would not require further workup in a low risk patient. 3.  The liver is enlarged and portion of the right lateral abdomen including the edge of the liver is cut off the edge of the exam.  No apparent mass or nodularity within the liver. 4.  There is cholelithiasis but without fat stranding around the gallbladder fossa. 5.  No bowel obstruction, pneumoperitoneum, or sign of diverticulitis. Some gastritis in the stomach is suspected. 6.  Some stranding around both kidneys appear symmetric and may relate to chronic renal disease. There is no hydronephrosis or hydroureter. 7.  Fatty atrophy of the pancreas.        Electronically signed by Jorden Cason MD on 2/16/2023 at 6:37 PM

## 2023-02-16 VITALS
OXYGEN SATURATION: 93 % | TEMPERATURE: 98.2 F | WEIGHT: 293 LBS | HEART RATE: 69 BPM | SYSTOLIC BLOOD PRESSURE: 107 MMHG | DIASTOLIC BLOOD PRESSURE: 55 MMHG | RESPIRATION RATE: 13 BRPM | BODY MASS INDEX: 48.82 KG/M2 | HEIGHT: 65 IN

## 2023-02-16 LAB
ANION GAP SERPL CALCULATED.3IONS-SCNC: 12 MMOL/L (ref 4–16)
BUN SERPL-MCNC: 29 MG/DL (ref 6–23)
CALCIUM SERPL-MCNC: 7.5 MG/DL (ref 8.3–10.6)
CHLORIDE BLD-SCNC: 96 MMOL/L (ref 99–110)
CO2: 28 MMOL/L (ref 21–32)
CREAT SERPL-MCNC: 1.6 MG/DL (ref 0.6–1.1)
CRP SERPL HS-MCNC: 128.2 MG/L
CULTURE: NORMAL
GFR SERPL CREATININE-BSD FRML MDRD: 37 ML/MIN/1.73M2
GLUCOSE BLD-MCNC: 271 MG/DL (ref 70–99)
GLUCOSE BLD-MCNC: 301 MG/DL (ref 70–99)
GLUCOSE BLD-MCNC: 347 MG/DL (ref 70–99)
GLUCOSE BLD-MCNC: 371 MG/DL (ref 70–99)
GLUCOSE SERPL-MCNC: 311 MG/DL (ref 70–99)
Lab: NORMAL
POTASSIUM SERPL-SCNC: 3.4 MMOL/L (ref 3.5–5.1)
SODIUM BLD-SCNC: 136 MMOL/L (ref 135–145)
SPECIMEN: NORMAL
TOTAL CK: 3053 IU/L (ref 26–140)

## 2023-02-16 PROCEDURE — 36415 COLL VENOUS BLD VENIPUNCTURE: CPT

## 2023-02-16 PROCEDURE — 6370000000 HC RX 637 (ALT 250 FOR IP): Performed by: INTERNAL MEDICINE

## 2023-02-16 PROCEDURE — 82962 GLUCOSE BLOOD TEST: CPT

## 2023-02-16 PROCEDURE — 2500000003 HC RX 250 WO HCPCS: Performed by: NURSE PRACTITIONER

## 2023-02-16 PROCEDURE — 2580000003 HC RX 258: Performed by: INTERNAL MEDICINE

## 2023-02-16 PROCEDURE — 82550 ASSAY OF CK (CPK): CPT

## 2023-02-16 PROCEDURE — 80048 BASIC METABOLIC PNL TOTAL CA: CPT

## 2023-02-16 PROCEDURE — 87040 BLOOD CULTURE FOR BACTERIA: CPT

## 2023-02-16 PROCEDURE — 6370000000 HC RX 637 (ALT 250 FOR IP): Performed by: STUDENT IN AN ORGANIZED HEALTH CARE EDUCATION/TRAINING PROGRAM

## 2023-02-16 PROCEDURE — 2580000003 HC RX 258: Performed by: NURSE PRACTITIONER

## 2023-02-16 PROCEDURE — 6360000002 HC RX W HCPCS: Performed by: STUDENT IN AN ORGANIZED HEALTH CARE EDUCATION/TRAINING PROGRAM

## 2023-02-16 PROCEDURE — 86140 C-REACTIVE PROTEIN: CPT

## 2023-02-16 RX ORDER — KETOTIFEN FUMARATE 0.35 MG/ML
1 SOLUTION/ DROPS OPHTHALMIC 4 TIMES DAILY
Status: DISCONTINUED | OUTPATIENT
Start: 2023-02-16 | End: 2023-02-17 | Stop reason: HOSPADM

## 2023-02-16 RX ORDER — ERYTHROMYCIN 5 MG/G
OINTMENT OPHTHALMIC EVERY 8 HOURS SCHEDULED
Status: DISCONTINUED | OUTPATIENT
Start: 2023-02-16 | End: 2023-02-16

## 2023-02-16 RX ORDER — POTASSIUM CHLORIDE 20 MEQ/1
20 TABLET, EXTENDED RELEASE ORAL 2 TIMES DAILY WITH MEALS
Status: COMPLETED | OUTPATIENT
Start: 2023-02-16 | End: 2023-02-16

## 2023-02-16 RX ADMIN — CARVEDILOL 6.25 MG: 6.25 TABLET, FILM COATED ORAL at 17:13

## 2023-02-16 RX ADMIN — SODIUM CHLORIDE, PRESERVATIVE FREE 10 ML: 5 INJECTION INTRAVENOUS at 21:06

## 2023-02-16 RX ADMIN — POTASSIUM CHLORIDE 20 MEQ: 1500 TABLET, EXTENDED RELEASE ORAL at 09:20

## 2023-02-16 RX ADMIN — CARVEDILOL 6.25 MG: 6.25 TABLET, FILM COATED ORAL at 09:20

## 2023-02-16 RX ADMIN — FUROSEMIDE 40 MG: 40 TABLET ORAL at 09:20

## 2023-02-16 RX ADMIN — INSULIN LISPRO 5 UNITS: 100 INJECTION, SOLUTION INTRAVENOUS; SUBCUTANEOUS at 09:21

## 2023-02-16 RX ADMIN — HEPARIN SODIUM 5000 UNITS: 5000 INJECTION INTRAVENOUS; SUBCUTANEOUS at 06:09

## 2023-02-16 RX ADMIN — KETOTIFEN FUMARATE 1 DROP: 0.35 SOLUTION/ DROPS OPHTHALMIC at 17:13

## 2023-02-16 RX ADMIN — KETOTIFEN FUMARATE 1 DROP: 0.35 SOLUTION/ DROPS OPHTHALMIC at 21:06

## 2023-02-16 RX ADMIN — INSULIN LISPRO 5 UNITS: 100 INJECTION, SOLUTION INTRAVENOUS; SUBCUTANEOUS at 12:58

## 2023-02-16 RX ADMIN — HEPARIN SODIUM 5000 UNITS: 5000 INJECTION INTRAVENOUS; SUBCUTANEOUS at 17:13

## 2023-02-16 RX ADMIN — NAFCILLIN SODIUM 12000 MG: 2 INJECTION, POWDER, LYOPHILIZED, FOR SOLUTION INTRAMUSCULAR; INTRAVENOUS at 21:16

## 2023-02-16 RX ADMIN — POTASSIUM CHLORIDE 20 MEQ: 1500 TABLET, EXTENDED RELEASE ORAL at 17:13

## 2023-02-16 RX ADMIN — INSULIN LISPRO 6 UNITS: 100 INJECTION, SOLUTION INTRAVENOUS; SUBCUTANEOUS at 12:58

## 2023-02-16 RX ADMIN — INSULIN LISPRO 4 UNITS: 100 INJECTION, SOLUTION INTRAVENOUS; SUBCUTANEOUS at 21:07

## 2023-02-16 RX ADMIN — INSULIN LISPRO 4 UNITS: 100 INJECTION, SOLUTION INTRAVENOUS; SUBCUTANEOUS at 17:14

## 2023-02-16 RX ADMIN — INSULIN LISPRO 5 UNITS: 100 INJECTION, SOLUTION INTRAVENOUS; SUBCUTANEOUS at 17:15

## 2023-02-16 RX ADMIN — ACETAMINOPHEN 650 MG: 325 TABLET ORAL at 13:01

## 2023-02-16 RX ADMIN — INSULIN LISPRO 8 UNITS: 100 INJECTION, SOLUTION INTRAVENOUS; SUBCUTANEOUS at 09:21

## 2023-02-16 RX ADMIN — KETOTIFEN FUMARATE 1 DROP: 0.35 SOLUTION/ DROPS OPHTHALMIC at 13:01

## 2023-02-16 RX ADMIN — INSULIN GLARGINE 15 UNITS: 100 INJECTION, SOLUTION SUBCUTANEOUS at 09:20

## 2023-02-16 RX ADMIN — HEPARIN SODIUM 5000 UNITS: 5000 INJECTION INTRAVENOUS; SUBCUTANEOUS at 21:06

## 2023-02-16 ASSESSMENT — PAIN SCALES - GENERAL
PAINLEVEL_OUTOF10: 0
PAINLEVEL_OUTOF10: 4

## 2023-02-16 NOTE — PROGRESS NOTES
V2.0  [facility] [place]      Name:  Benedict Portillo /Age/Sex: 1965  (62 y.o. female)   MRN & CSN:  [id number] & [id number] Encounter Date/Time: 2023 11:42 AM EST    [facility]  [facility] PCP: No primary care provider on file. Hospital Day: 5    Assessment and Plan:   Benedict Portillo is a 62 y.o. female with pmh of hypertension, diabetes mellitus, history of diastolic heart failure, SAAD, COPD, obesity hypoventilation syndrome and chronic respiratory failure on 2 L of O2 who presents with history of found on the floor with Severe sepsis without septic shock ([address] and rhabdomyolysis. Acute R. Eye Pain   Concerning for retinal detachment versus endophthalmitis   Discussed with ophthalmology, will see patient this evening. RAMON w/ ATN due to Rhabdomyolysis and underlying severe sepsis  Stable Cr function  Down trending CK  Avoid nephrotoxic agents and renally dose medications   Nephrology following    Severe sepsis of unknown etiology  MSSA Bacteremia  Unlikely pneumonia as the source, feel like poor dentition w/ multiple caries could be the source. No clinical evidence of meningitis. It can occur wo apparent focus. Repeat Bcx positive / Staph species  JONI with no evidence of vegetation  DC Vancomycin, Continue Nafcillin  Repeat blood cultures pending  ID following    # Fall from bed: CT head, CT C-spine no acute process, patient lives alone at home and she is on disability, PT/OT evaluation when appropriate.     # Hypertension: On Lasix 40 mg daily, Coreg 6.25 mg daily hold    # Type 2 diabetes mellitus on metformin 1 g twice daily, glipizide 10 mg daily, hold oral diabetic's, start on sliding scale insulin, hypoglycemic protocol, diabetic diet and obtain A1c    #History of chronic diastolic heart failure, continue Lasix home regimen    #History of pulmonary hypertension  #Obesity hypoventilation syndrome  #Sleep apnea  #Morbid obesity  #Pulmonary hypertension  #Chronic respiratory failure on 2 L of O2    Diet ADULT DIET; Regular; 5 carb choices (75 gm/meal); 2000 ml   DVT Prophylaxis [] Lovenox, [x]  Heparin, [] SCDs, [] Ambulation,  [] Eliquis, [] Xarelto  [] Coumadin   Code Status Full Code   Disposition From: Home  Expected Disposition: Home versus SNF  Estimated Date of Discharge: TBD  Patient requires continued admission due to sepsis/rhabdomyolysis, RAMON   Surrogate Decision Maker/ POA Daughter     Subjective:     Chief Complaint: Fall (\"Slide out of bed. \" Pt found down by family) and Emesis (Since this morning)       Patient seen and examined at bedside. Patient denies any complaints, denies any fever, chills, nausea, vomiting, chest pain, shortness of breath, abdominal pain, diarrhea, constipation, urinary symptoms. Reports worsening right eye pain associated with floaters. Review of Systems:    Review of Systems    As above    Objective: Intake/Output Summary (Last 24 hours) at 2/16/2023 1636  Last data filed at 2/16/2023 8881  Gross per 24 hour   Intake 1392.14 ml   Output 1550 ml   Net -157.86 ml        Vitals:   Vitals:    02/16/23 0925   BP: 121/76   Pulse: 84   Resp: 30   Temp: 98.2 °F (36.8 °C)   SpO2:        Physical Exam:     General: Afebrile, on 2 L of O2 (baseline)  Eyes: EOMI, conjunctiva erythema  ENT: neck supple, no JVD  Cardiovascular: Regular rate and rhythm  Respiratory: Clear to auscultation no wheezing no crackles  Gastrointestinal: Soft, non tender bowel sounds normal  Genitourinary: no suprapubic tenderness  Musculoskeletal: No edema  Skin: warm, dry  Neuro: Alert. Oriented no focal deficit  Psych: Mood appropriate.      Medications:   Medications:    potassium chloride  20 mEq Oral BID WC    ketotifen  1 drop Right Eye 4x Daily    insulin glargine  15 Units SubCUTAneous Daily with breakfast    insulin lispro  5 Units SubCUTAneous TID WC    nafcillin 12,000 mg in sodium chloride 0.9% 500 mL infusion  12,000 mg IntraVENous Q24H    furosemide  40 mg Oral Daily    carvedilol  6.25 mg Oral BID     heparin (porcine)  5,000 Units SubCUTAneous 3 times per day    sodium chloride flush  5-40 mL IntraVENous 2 times per day    insulin lispro  0-8 Units SubCUTAneous TID     insulin lispro  0-4 Units SubCUTAneous Nightly      Infusions:    sodium chloride      dextrose       PRN Meds: ondansetron, 4 mg, Q6H PRN  sodium chloride flush, 5-40 mL, PRN  sodium chloride, , PRN  acetaminophen, 650 mg, Q6H PRN   Or  acetaminophen, 650 mg, Q6H PRN  glucose, 4 tablet, PRN  dextrose bolus, 125 mL, PRN   Or  dextrose bolus, 250 mL, PRN  glucagon (rDNA), 1 mg, PRN  dextrose, , Continuous PRN      Labs      Recent Results (from the past 24 hour(s))   POCT Glucose    Collection Time: 02/15/23  5:14 PM   Result Value Ref Range    POC Glucose 289 (H) 70 - 99 MG/DL   POCT Glucose    Collection Time: 02/15/23  8:38 PM   Result Value Ref Range    POC Glucose 282 (H) 70 - 99 MG/DL   C-Reactive Protein    Collection Time: 02/16/23  6:57 AM   Result Value Ref Range    CRP High Sensitivity 128.2 (H) <5.0 mg/L   CK    Collection Time: 02/16/23  7:42 AM   Result Value Ref Range    Total CK 3,053 (H) 26 - 140 IU/L   Basic Metabolic Panel    Collection Time: 02/16/23  7:42 AM   Result Value Ref Range    Sodium 136 135 - 145 MMOL/L    Potassium 3.4 (L) 3.5 - 5.1 MMOL/L    Chloride 96 (L) 99 - 110 mMol/L    CO2 28 21 - 32 MMOL/L    Anion Gap 12 4 - 16    BUN 29 (H) 6 - 23 MG/DL    Creatinine 1.6 (H) 0.6 - 1.1 MG/DL    Est, Glom Filt Rate 37 (L) >60 mL/min/1.73m2    Glucose 311 (H) 70 - 99 MG/DL    Calcium 7.5 (L) 8.3 - 10.6 MG/DL   POCT Glucose    Collection Time: 02/16/23  8:48 AM   Result Value Ref Range    POC Glucose 371 (H) 70 - 99 MG/DL   POCT Glucose    Collection Time: 02/16/23 12:11 PM   Result Value Ref Range    POC Glucose 301 (H) 70 - 99 MG/DL        Imaging/Diagnostics Last 24 Hours   CT HEAD WO CONTRAST    Result Date: 2/12/2023  EXAMINATION: CT OF THE HEAD WITHOUT CONTRAST 2/12/2023 4:25 pm TECHNIQUE: CT of the head was performed without the administration of intravenous contrast. Automated exposure control, iterative reconstruction, and/or weight based adjustment of the mA/kV was utilized to reduce the radiation dose to as low as reasonably achievable. COMPARISON: None. HISTORY: ORDERING SYSTEM PROVIDED HISTORY: Fall, hit head TECHNOLOGIST PROVIDED HISTORY: Has a \"code stroke\" or \"stroke alert\" been called? ->No Reason for exam:->Fall, hit head Decision Support Exception - unselect if not a suspected or confirmed emergency medical condition->Emergency Medical Condition (MA) Reason for Exam: Fall, hit head FINDINGS: BRAIN/VENTRICLES: There is no acute intracranial hemorrhage, mass effect or midline shift. No abnormal extra-axial fluid collection. The gray-white differentiation is maintained without evidence of an acute infarct. There is no evidence of hydrocephalus. ORBITS: The visualized portion of the orbits demonstrate no acute abnormality. SINUSES: The visualized paranasal sinuses and mastoid air cells demonstrate no acute abnormality. SOFT TISSUES/SKULL:  No acute abnormality of the visualized skull or soft tissues. No acute intracranial abnormality. CT CERVICAL SPINE WO CONTRAST    Result Date: 2/12/2023  EXAMINATION: CT OF THE CERVICAL SPINE WITHOUT CONTRAST 2/12/2023 4:25 pm TECHNIQUE: CT of the cervical spine was performed without the administration of intravenous contrast. Multiplanar reformatted images are provided for review. Automated exposure control, iterative reconstruction, and/or weight based adjustment of the mA/kV was utilized to reduce the radiation dose to as low as reasonably achievable. COMPARISON: None.  HISTORY: ORDERING SYSTEM PROVIDED HISTORY: Fall from bed TECHNOLOGIST PROVIDED HISTORY: Reason for exam:->Fall from bed Decision Support Exception - unselect if not a suspected or confirmed emergency medical condition->Emergency Medical Condition (MA) Reason for Exam: FALL, NECK PAIN FINDINGS: BONES/ALIGNMENT: There is no acute fracture or traumatic malalignment. DEGENERATIVE CHANGES: Calcification of the posterior longitudinal ligament at C3-C4, which causes eccentric narrowing of the spinal canal. SOFT TISSUES: There is no prevertebral soft tissue swelling. No acute abnormality of the cervical spine. XR CHEST PORTABLE    Result Date: 2/12/2023  EXAMINATION: ONE XRAY VIEW OF THE CHEST 2/12/2023 5:54 pm COMPARISON: Chest x-ray September 19, 2008 HISTORY: ORDERING SYSTEM PROVIDED HISTORY: Fall TECHNOLOGIST PROVIDED HISTORY: Reason for exam:->Fall Reason for Exam: fall FINDINGS: Cardiac silhouette is enlarged but grossly stable given changes in technique when compared with prior exam.  Central pulmonary vascular congestion and mild interstitial opacities favored to reflect changes related to pulmonary edema. No large effusion. No pneumothorax. 1. Cardiomegaly and findings favored to reflect pulmonary edema. CT CHEST ABDOMEN PELVIS W CONTRAST Additional Contrast? None    Result Date: 2/13/2023  EXAMINATION: CT OF THE CHEST, ABDOMEN, AND PELVIS WITH CONTRAST 2/13/2023 2:36 am TECHNIQUE: CT of the chest, abdomen and pelvis was performed with the administration of intravenous contrast. Multiplanar reformatted images are provided for review. Automated exposure control, iterative reconstruction, and/or weight based adjustment of the mA/kV was utilized to reduce the radiation dose to as low as reasonably achievable.  COMPARISON: None HISTORY: ORDERING SYSTEM PROVIDED HISTORY: Shortness of breath, sepsis TECHNOLOGIST PROVIDED HISTORY: Reason for exam:->Shortness of breath, sepsis Additional Contrast?->None Decision Support Exception - unselect if not a suspected or confirmed emergency medical condition->Emergency Medical Condition (MA) Reason for Exam: Shortness of breath, sepsis, emesis FINDINGS: Chest: Mediastinum: Cardiac chambers are not enlarged and no significant pericardial effusion. Mediastinal lymph nodes are not enlarged. Thoracic aorta is not aneurysmal.  Bolus timing does not allow for evaluation of the pulmonary arterial system. Lungs/pleura: Linear bands of atelectasis/scarring in the right lower lobe and some calcified granulomas. No focal consolidation or diffuse opacities are seen to indicate pneumonia. Noncalcified 3 mm nodule in the left upper lobe on series 304 image number 66 there is no pleural effusion or pneumothorax. Soft Tissues/Bones: No acute or displaced fractures are seen at the ribs. There is no soft tissue emphysema in the chest wall. Degenerative changes anterior longitudinal ligament ossification in the mid to lower thoracic spine. No acute fracture or subluxation is demonstrated at the spine. Abdomen/Pelvis: Organs: Liver is enlarged and a small portion is cut off along the right lateral aspect. Otherwise the liver surface is smooth. The biliary system is not dilated. Multiple calcified gallstones are present in the gallbladder without surrounding fat stranding at the gallbladder fossa. The spleen is not enlarged. There is fatty atrophy of the pancreas with limited amounts of parenchyma remaining. Adrenal glands are unremarkable. Mild perinephric stranding is symmetric and may relate to chronic renal disease. There is no acute enhancement abnormality, hydronephrosis, or hydroureter. GI/Bowel: Small portion of the ascending colon is cut off along the right lateral aspect of the abdomen. There is constipation in the distal colon and rectum. Scattered diverticulosis but without signs of acute diverticulitis. Small bowel and colon are not dilated. Some gastritis is suspected at the stomach. There is no ascites or pneumoperitoneum. Pelvis: The urinary bladder wall is not significantly thickened. The uterus is not enlarged. No free fluid in the pelvis.  Peritoneum/Retroperitoneum: No abdominal aortic aneurysm, retroperitoneal hematoma, or bulky adenopathy. Bones/Soft Tissues: The right lateral abdominal wall is cut off the exam. Degenerative changes along the spine without acute fracture deformity or subluxation. Also has degenerative changes in the sacroiliac joints and hips with joint space narrowing and marginal sclerosis. 1.  Linear bands of atelectasis/scarring in the right lower chest and some scattered calcified granulomas without signs of pneumonia. 2.  A noncalcified 3 mm nodule in the left upper lobe would not require further workup in a low risk patient. 3.  The liver is enlarged and portion of the right lateral abdomen including the edge of the liver is cut off the edge of the exam.  No apparent mass or nodularity within the liver. 4.  There is cholelithiasis but without fat stranding around the gallbladder fossa. 5.  No bowel obstruction, pneumoperitoneum, or sign of diverticulitis. Some gastritis in the stomach is suspected. 6.  Some stranding around both kidneys appear symmetric and may relate to chronic renal disease. There is no hydronephrosis or hydroureter. 7.  Fatty atrophy of the pancreas.        Electronically signed by Casey Musa MD on 2/16/2023 at 4:36 PM

## 2023-02-16 NOTE — PROGRESS NOTES
Physical Therapy  Attempted to see pt this AM but pt stating she had a rough night and would like to attempt to sleep. Pt also stating she did not defer the appeal option, would just rather have [place] [id number] than [facility].

## 2023-02-16 NOTE — PROGRESS NOTES
Infectious Disease Progress Note  2/15/2023   Patient Name: Irene Son : 1965   Impression  Severe Sepsis Secondary to MSSA Bacteremia Possible Pneumonia:  BREE Lung Nodule:  Cholelithiasis without Cholecystitis:  Afebrile, no leukocytosis, Pct and CRP on DWT  CrCl: 52  AST elevated 145, 224 likely from rhabdomyolysis   -Rapid Flu, COVID-19 Negative  -BC  MSSA   -BC  GPC  -BC Pending  -MRSA/MSSA screen Pending  -Urine culture: Mixed skin/urogenital carie  -CT Chest A&P W contrast: 1. Linear bands of atelectasis/scarring in the right lower chest and some   scattered calcified granulomas without signs of pneumonia. 2.  A noncalcified 3 mm nodule in the left upper lobe would not require   further workup in a low risk patient. 3.  The liver is enlarged and portion of the right lateral abdomen including   the edge of the liver is cut off the edge of the exam.  No apparent mass or   nodularity within the liver. 4.  There is cholelithiasis but without fat stranding around the gallbladder   fossa. 5.  No bowel obstruction, pneumoperitoneum, or sign of diverticulitis. Some   gastritis in the stomach is suspected. 6.  Some stranding around both kidneys appear symmetric and may relate to   chronic renal disease. There is no hydronephrosis or hydroureter. 7.  Fatty atrophy of the pancreas. -JONI: No evidence of endocarditis. RAMON:  Rhabdomyolysis s/p Fall:  Dr. Isauro Odell onboard     DMII:  Chronic Hypoxic Respiratory Failure:   Baseline oxygen 2L/min/nc  HTN/ HFpEF/ PHTN:  Morbid Obesity: BMI:  50.75  Multi-morbidity: per PMHx     Plan:  DC IV vancomycin as spp and sensi of MSSA returned  Start IV continuous nafcillin gtt 12,000 mg every 24 hours  Trend CRP and Pct, ordered  Repeat BC until negative at 48 hours  Ordered repeat [facility]     Ongoing Antimicrobial Therapy  Nafcillin 2/15-? Completed Antimicrobial Therapy  Zosyn -? ?   Vancomycin 2/12-15  History:?Interval history noted.  Chief complaint: Sepsis secondary to MSSA bacteremia.   Denies n/v/d/f or untoward effects of antibiotics  Physical Exam:  Vital Signs: /68   Pulse 82   Temp 97.2 °F (36.2 °C) (Oral)   Resp 26   Ht 5' 5\" (1.651 m)   Wt (!) 305 lb (138.3 kg)   SpO2 97%   BMI 50.75 kg/m²     Gen: alert and oriented  Chest: no distress and CTA. Good air movement. Oxygen per NC.  Heart: RRR and no MRG.   Abd: soft, non-distended, no tenderness, no hepatomegaly. Normoactive bowel sounds.  Ext: no clubbing, cyanosis, or edema  Neuro: Mental status intact. CN 2-12 intact and no focal sensory or motor deficits     Radiologic / Imaging / TESTING  2/12/2023 CT Head WO Contrast:  Impression   No acute intracranial abnormality.      2/12/2023 CT Cervical Spine WO Contrast:  Impression   No acute abnormality of the cervical spine.      2/12/2023 XR Chest Portable:  Impression   1. Cardiomegaly and findings favored to reflect pulmonary edema.      2/12/2023 CT Chest Abdomen Pelvis W Contrast:  Impression   1.  Linear bands of atelectasis/scarring in the right lower chest and some   scattered calcified granulomas without signs of pneumonia.       2.  A noncalcified 3 mm nodule in the left upper lobe would not require   further workup in a low risk patient.       3.  The liver is enlarged and portion of the right lateral abdomen including   the edge of the liver is cut off the edge of the exam.  No apparent mass or   nodularity within the liver.       4.  There is cholelithiasis but without fat stranding around the gallbladder   fossa.       5.  No bowel obstruction, pneumoperitoneum, or sign of diverticulitis.  Some   gastritis in the stomach is suspected.       6.  Some stranding around both kidneys appear symmetric and may relate to   chronic renal disease.  There is no hydronephrosis or hydroureter.       7.  Fatty atrophy of the pancreas.      2/13/2023 XR Chest Portable;  Impression  Linear changes in the right lung base which could represent atelectasis or   infiltrate. Small right effusion. Follow up to resolution is suggested. 2/14/2023 JONI:   Summary   Mild to moderate aortic regurgitation. There is a small (trivial) pericardial effusion. No evidence of thrombus within the left atrial appendage. No evidence of endocarditis noted. small pericardial effusion        Labs:    Recent Results (from the past 24 hour(s))   POCT Glucose    Collection Time: 02/14/23  8:50 PM   Result Value Ref Range    POC Glucose 304 (H) 70 - 99 MG/DL   C-Reactive Protein    Collection Time: 02/15/23  6:14 AM   Result Value Ref Range    CRP High Sensitivity 164.6 (H) <5.0 mg/L   Vancomycin Level, Random    Collection Time: 02/15/23  6:14 AM   Result Value Ref Range    Vancomycin Rm 6.2 UG/ML    DOSE AMOUNT DOSE AMT.  GIVEN - `     DOSE TIME DOSE TIME GIVEN - `    Procalcitonin    Collection Time: 02/15/23  6:14 AM   Result Value Ref Range    Procalcitonin 5.20    CBC with Auto Differential    Collection Time: 02/15/23  6:14 AM   Result Value Ref Range    WBC 5.5 4.0 - 10.5 K/CU MM    RBC 3.65 (L) 4.2 - 5.4 M/CU MM    Hemoglobin 9.7 (L) 12.5 - 16.0 GM/DL    Hematocrit 31.3 (L) 37 - 47 %    MCV 85.8 78 - 100 FL    MCH 26.6 (L) 27 - 31 PG    MCHC 31.0 (L) 32.0 - 36.0 %    RDW 13.8 11.7 - 14.9 %    Platelets 172 897 - 228 K/CU MM    MPV 11.6 (H) 7.5 - 11.1 FL    Differential Type AUTOMATED DIFFERENTIAL     Segs Relative 78.2 (H) 36 - 66 %    Lymphocytes % 11.0 (L) 24 - 44 %    Monocytes % 9.5 (H) 0 - 4 %    Eosinophils % 0.2 0 - 3 %    Basophils % 0.4 0 - 1 %    Segs Absolute 4.3 K/CU MM    Lymphocytes Absolute 0.6 K/CU MM    Monocytes Absolute 0.5 K/CU MM    Eosinophils Absolute 0.0 K/CU MM    Basophils Absolute 0.0 K/CU MM    Nucleated RBC % 0.0 %    Total Nucleated RBC 0.0 K/CU MM    Total Immature Neutrophil 0.04 K/CU MM    Immature Neutrophil % 0.7 (H) 0 - 0.43 %   Comprehensive Metabolic Panel w/ Reflex to MG    Collection Time: 02/15/23  6:14 AM   Result Value Ref Range    Sodium 136 135 - 145 MMOL/L    Potassium 3.5 3.5 - 5.1 MMOL/L    Chloride 99 99 - 110 mMol/L    CO2 27 21 - 32 MMOL/L    BUN 28 (H) 6 - 23 MG/DL    Creatinine 1.7 (H) 0.6 - 1.1 MG/DL    Est, Glom Filt Rate 35 (L) >60 mL/min/1.73m2    Glucose 245 (H) 70 - 99 MG/DL    Calcium 7.7 (L) 8.3 - 10.6 MG/DL    Albumin 2.8 (L) 3.4 - 5.0 GM/DL    Total Protein 5.0 (L) 6.4 - 8.2 GM/DL    Total Bilirubin 0.3 0.0 - 1.0 MG/DL    ALT 24 10 - 40 U/L     (H) 15 - 37 IU/L    Alkaline Phosphatase 67 40 - 128 IU/L    Anion Gap 10 4 - 16   Magnesium    Collection Time: 02/15/23  6:14 AM   Result Value Ref Range    Magnesium 2.0 1.8 - 2.4 mg/dl   CK    Collection Time: 02/15/23  6:14 AM   Result Value Ref Range    Total CK 5,628 (H) 26 - 140 IU/L   POCT Glucose    Collection Time: 02/15/23  8:16 AM   Result Value Ref Range    POC Glucose 271 (H) 70 - 99 MG/DL   POCT Glucose    Collection Time: 02/15/23 11:39 AM   Result Value Ref Range    POC Glucose 272 (H) 70 - 99 MG/DL   POCT Glucose    Collection Time: 02/15/23  5:14 PM   Result Value Ref Range    POC Glucose 289 (H) 70 - 99 MG/DL     CULTURE results: Invalid input(s): BLOOD CULTURE,  URINE CULTURE, SURGICAL CULTURE    Diagnosis:  Patient Active Problem List   Diagnosis    Severe sepsis without septic shock (HCC)    Accidental fall from bed    Sepsis due to methicillin susceptible Staphylococcus aureus (MSSA) without acute organ dysfunction (HCC)    Staphylococcus aureus bacteremia    Class 3 severe obesity due to excess calories with serious comorbidity and body mass index (BMI) of 50.0 to 59.9 in [facility])       Active Problems  Principal Problem:    Severe sepsis without septic shock (HCC)  Active Problems:    Accidental fall from bed    Sepsis due to methicillin susceptible Staphylococcus aureus (MSSA) without acute organ dysfunction (HCC)    Staphylococcus aureus bacteremia    Class 3 severe obesity due to excess calories with serious comorbidity and body mass index (BMI) of 50.0 to 59.9 in adult (HCC)  Resolved Problems:    * No resolved hospital problems. *    Electronically signed by: Electronically signed by MASON Zavala CNP on 2/15/2023 at 7:43 PM

## 2023-02-16 NOTE — PLAN OF CARE
Problem: Discharge Planning  Goal: Discharge to home or other facility with appropriate resources  2/16/2023 0451 by Joon Carter RN  Outcome: Progressing  2/16/2023 0451 by Joon Carter RN  Outcome: Progressing     Problem: Skin/Tissue Integrity  Goal: Absence of new skin breakdown  Description: 1. Monitor for areas of redness and/or skin breakdown  2. Assess vascular access sites hourly  3. Every 4-6 hours minimum:  Change oxygen saturation probe site  4. Every 4-6 hours:  If on nasal continuous positive airway pressure, respiratory therapy assess nares and determine need for appliance change or resting period.   2/16/2023 0451 by Joon Carter RN  Outcome: Progressing  2/16/2023 0451 by Joon Carter RN  Outcome: Adequate for Discharge     Problem: Safety - Adult  Goal: Free from fall injury  2/16/2023 0451 by Joon Carter RN  Outcome: Progressing  2/16/2023 0451 by Joon Carter RN  Outcome: Progressing

## 2023-02-16 NOTE — CONSULTS
62year old obese diabetic who noticed a mild pain or discomfort OD on Sunday. Pain has increased somewhat today. She has staph aureus growing out of blood cultures. Site for the sepsis is undetermined. She claims some increased spots or floaters OD which has slightly blurred her vision. She says that she has had a mild discharge recently. No history of any URI. Is using Zaditor QID. Exam-    Visual acuity (approximate) 20/40 OU. Conjunctiva slightly cheimotic OD but no injection. Normal ocular motility OU. Cornea, anterior chamber normal OU. Dilated fundus exam performed OU. Fundus exam-Normal view of left fundus-slighty less clear view or right fundus with direct ophthalmoscope. No diabetic retinopathy seen. Exam with indirect ophthalmoscope-Definitely more vitreous haze with white area in inferior vitreous which could be a vitreous abscess. Diagnosis:  Possible endophthalmitis OD. Rec:  Transfer to [facility]. Discussed case with Dr. Miranda Phillips. He will arrange transfer.

## 2023-02-16 NOTE — DISCHARGE SUMMARY
V2.0  Discharge Summary    Name:  Candance Pal /Age/Sex: 1965 (62 y.o. female)   Admit Date: 2023  Discharge Date: 23    MRN & CSN:  [id number] & [id number] Encounter Date and Time 23 6:44 PM EST    Attending:  Arnold Nolasco MD Discharging Provider: Arnold Nolasco, Jenaro ben Garcia Course:     Brief HPI: Candance Pal is a 62 y.o. female who presented with ***    Brief Problem Based Course:   ***      The patient expressed appropriate understanding of, and agreement with the discharge recommendations, medications, and plan. Consults this admission:  PHARMACY TO DOSE VANCOMYCIN  IP CONSULT TO IV TEAM  IP CONSULT TO NEPHROLOGY  IP CONSULT TO INFECTIOUS DISEASES  IP CONSULT TO CARDIOLOGY  IP CONSULT TO OPHTHALMOLOGY    Discharge Diagnosis:   Severe sepsis without septic shock [facility])    ***    Discharge Instruction:   Follow up appointments: ***   Primary care physician: No primary care provider on file.  within 2 weeks  Diet: {diet:03859}   Activity: {discharge activity:12796}  Disposition: Discharged to:   []Home, []C, []SNF, []Acute Rehab, []Hospice ***  Condition on discharge: Stable  Labs and Tests to be Followed up as an outpatient by PCP or Specialist: ***    Discharge Medications:        Medication List        CONTINUE taking these medications      carvedilol 6.25 MG tablet  Commonly known as: COREG     furosemide 40 MG tablet  Commonly known as: LASIX     glipiZIDE 10 MG extended release tablet  Commonly known as: GLUCOTROL XL     metFORMIN 1000 MG tablet  Commonly known as: GLUCOPHAGE             Objective Findings at Discharge:   BP (!) 102/59   Pulse 76   Temp 98.9 °F (37.2 °C) (Oral)   Resp 27   Ht 5' 5\" (1.651 m)   Wt (!) 309 lb 11.9 oz (140.5 kg)   SpO2 97%   BMI 51.54 kg/m²       Physical Exam:   Physical Exam         Labs and Imaging   CT HEAD WO CONTRAST    Result Date: 2023  EXAMINATION: CT OF THE HEAD WITHOUT CONTRAST  2023 4:25 pm TECHNIQUE: CT of the head was performed without the administration of intravenous contrast. Automated exposure control, iterative reconstruction, and/or weight based adjustment of the mA/kV was utilized to reduce the radiation dose to as low as reasonably achievable. COMPARISON: None. HISTORY: ORDERING SYSTEM PROVIDED HISTORY: Fall, hit head TECHNOLOGIST PROVIDED HISTORY: Has a \"code stroke\" or \"stroke alert\" been called? ->No Reason for exam:->Fall, hit head Decision Support Exception - unselect if not a suspected or confirmed emergency medical condition->Emergency Medical Condition (MA) Reason for Exam: Fall, hit head FINDINGS: BRAIN/VENTRICLES: There is no acute intracranial hemorrhage, mass effect or midline shift. No abnormal extra-axial fluid collection. The gray-white differentiation is maintained without evidence of an acute infarct. There is no evidence of hydrocephalus. ORBITS: The visualized portion of the orbits demonstrate no acute abnormality. SINUSES: The visualized paranasal sinuses and mastoid air cells demonstrate no acute abnormality. SOFT TISSUES/SKULL:  No acute abnormality of the visualized skull or soft tissues. No acute intracranial abnormality. CT CERVICAL SPINE WO CONTRAST    Result Date: 2/12/2023  EXAMINATION: CT OF THE CERVICAL SPINE WITHOUT CONTRAST 2/12/2023 4:25 pm TECHNIQUE: CT of the cervical spine was performed without the administration of intravenous contrast. Multiplanar reformatted images are provided for review. Automated exposure control, iterative reconstruction, and/or weight based adjustment of the mA/kV was utilized to reduce the radiation dose to as low as reasonably achievable. COMPARISON: None.  HISTORY: ORDERING SYSTEM PROVIDED HISTORY: Fall from bed TECHNOLOGIST PROVIDED HISTORY: Reason for exam:->Fall from bed Decision Support Exception - unselect if not a suspected or confirmed emergency medical condition->Emergency Medical Condition (MA) Reason for Exam: FALL, NECK PAIN FINDINGS: BONES/ALIGNMENT: There is no acute fracture or traumatic malalignment. DEGENERATIVE CHANGES: Calcification of the posterior longitudinal ligament at C3-C4, which causes eccentric narrowing of the spinal canal. SOFT TISSUES: There is no prevertebral soft tissue swelling. No acute abnormality of the cervical spine. XR CHEST PORTABLE    Result Date: 2/13/2023  EXAMINATION: ONE XRAY VIEW OF THE CHEST 2/13/2023 12:11 pm COMPARISON: 02/12/2023 HISTORY: ORDERING SYSTEM PROVIDED HISTORY: sob TECHNOLOGIST PROVIDED HISTORY: Reason for exam:->sob Reason for Exam: sob Follow-up FINDINGS: The trachea is midline. The patient is rotated toward the right. There is mild fullness in the right hilar region which may be related to the vascular structures rather than an abnormality. Refer to the recent CT. There is interval development of atelectasis or infiltrate in the right lung base. Small right effusion. Linear changes in the right lung base which could represent atelectasis or infiltrate. Small right effusion. Follow up to resolution is suggested. XR CHEST PORTABLE    Result Date: 2/12/2023  EXAMINATION: ONE XRAY VIEW OF THE CHEST 2/12/2023 5:54 pm COMPARISON: Chest x-ray September 19, 2008 HISTORY: ORDERING SYSTEM PROVIDED HISTORY: Fall TECHNOLOGIST PROVIDED HISTORY: Reason for exam:->Fall Reason for Exam: fall FINDINGS: Cardiac silhouette is enlarged but grossly stable given changes in technique when compared with prior exam.  Central pulmonary vascular congestion and mild interstitial opacities favored to reflect changes related to pulmonary edema. No large effusion. No pneumothorax. 1. Cardiomegaly and findings favored to reflect pulmonary edema.      CT CHEST ABDOMEN PELVIS W CONTRAST Additional Contrast? None    Result Date: 2/13/2023  EXAMINATION: CT OF THE CHEST, ABDOMEN, AND PELVIS WITH CONTRAST 2/13/2023 2:36 am TECHNIQUE: CT of the chest, abdomen and pelvis was performed with the administration of intravenous contrast. Multiplanar reformatted images are provided for review. Automated exposure control, iterative reconstruction, and/or weight based adjustment of the mA/kV was utilized to reduce the radiation dose to as low as reasonably achievable. COMPARISON: None HISTORY: ORDERING SYSTEM PROVIDED HISTORY: Shortness of breath, sepsis TECHNOLOGIST PROVIDED HISTORY: Reason for exam:->Shortness of breath, sepsis Additional Contrast?->None Decision Support Exception - unselect if not a suspected or confirmed emergency medical condition->Emergency Medical Condition (MA) Reason for Exam: Shortness of breath, sepsis, emesis FINDINGS: Chest: Mediastinum: Cardiac chambers are not enlarged and no significant pericardial effusion. Mediastinal lymph nodes are not enlarged. Thoracic aorta is not aneurysmal.  Bolus timing does not allow for evaluation of the pulmonary arterial system. Lungs/pleura: Linear bands of atelectasis/scarring in the right lower lobe and some calcified granulomas. No focal consolidation or diffuse opacities are seen to indicate pneumonia. Noncalcified 3 mm nodule in the left upper lobe on series 304 image number 66 there is no pleural effusion or pneumothorax. Soft Tissues/Bones: No acute or displaced fractures are seen at the ribs. There is no soft tissue emphysema in the chest wall. Degenerative changes anterior longitudinal ligament ossification in the mid to lower thoracic spine. No acute fracture or subluxation is demonstrated at the spine. Abdomen/Pelvis: Organs: Liver is enlarged and a small portion is cut off along the right lateral aspect. Otherwise the liver surface is smooth. The biliary system is not dilated. Multiple calcified gallstones are present in the gallbladder without surrounding fat stranding at the gallbladder fossa. The spleen is not enlarged. There is fatty atrophy of the pancreas with limited amounts of parenchyma remaining.   Adrenal glands are unremarkable. Mild perinephric stranding is symmetric and may relate to chronic renal disease. There is no acute enhancement abnormality, hydronephrosis, or hydroureter. GI/Bowel: Small portion of the ascending colon is cut off along the right lateral aspect of the abdomen. There is constipation in the distal colon and rectum. Scattered diverticulosis but without signs of acute diverticulitis. Small bowel and colon are not dilated. Some gastritis is suspected at the stomach. There is no ascites or pneumoperitoneum. Pelvis: The urinary bladder wall is not significantly thickened. The uterus is not enlarged. No free fluid in the pelvis. Peritoneum/Retroperitoneum: No abdominal aortic aneurysm, retroperitoneal hematoma, or bulky adenopathy. Bones/Soft Tissues: The right lateral abdominal wall is cut off the exam. Degenerative changes along the spine without acute fracture deformity or subluxation. Also has degenerative changes in the sacroiliac joints and hips with joint space narrowing and marginal sclerosis. 1.  Linear bands of atelectasis/scarring in the right lower chest and some scattered calcified granulomas without signs of pneumonia. 2.  A noncalcified 3 mm nodule in the left upper lobe would not require further workup in a low risk patient. 3.  The liver is enlarged and portion of the right lateral abdomen including the edge of the liver is cut off the edge of the exam.  No apparent mass or nodularity within the liver. 4.  There is cholelithiasis but without fat stranding around the gallbladder fossa. 5.  No bowel obstruction, pneumoperitoneum, or sign of diverticulitis. Some gastritis in the stomach is suspected. 6.  Some stranding around both kidneys appear symmetric and may relate to chronic renal disease. There is no hydronephrosis or hydroureter. 7.  Fatty atrophy of the pancreas.         CBC:   Recent Labs     02/14/23  0044 02/15/23  0614   WBC 5.9 5.5   HGB 10.7* 9.7*    161 BMP:    Recent Labs     02/14/23 0044 02/15/23  0614 02/16/23  0742   * 136 136   K 3.6 3.5 3.4*   CL 94* 99 96*   CO2 22 27 28   BUN 30* 28* 29*   CREATININE 2.0* 1.7* 1.6*   GLUCOSE 270* 245* 311*     Hepatic:   Recent Labs     02/14/23 0044 02/15/23  0614   * 129*   ALT 30 24   BILITOT 0.6 0.3   ALKPHOS 69 67     Lipids: No results found for: CHOL, HDL, TRIG  Hemoglobin A1C:   Lab Results   Component Value Date/Time    LABA1C 9.5 02/14/2023 12:44 AM     TSH: No results found for: TSH  Troponin: No results found for: TROPONINT  Lactic Acid: No results for input(s): LACTA in the last 72 hours.   BNP:   Recent Labs     02/14/23 0044   PROBNP 1,415*     UA:  Lab Results   Component Value Date/Time    NITRU NEGATIVE 02/12/2023 07:47 PM    COLORU YELLOW 02/12/2023 07:47 PM    WBCUA NONE SEEN 02/12/2023 07:47 PM    RBCUA 1 02/12/2023 07:47 PM    TRICHOMONAS NONE SEEN 02/12/2023 07:47 PM    BACTERIA NEGATIVE 02/12/2023 07:47 PM    CLARITYU CLEAR 02/12/2023 07:47 PM    SPECGRAV 1.025 02/12/2023 07:47 PM    LEUKOCYTESUR NEGATIVE 02/12/2023 07:47 PM    UROBILINOGEN 1.0 02/12/2023 07:47 PM    BILIRUBINUR MODERATE NUMBER OR AMOUNT OBSERVED 02/12/2023 07:47 PM    BLOODU LARGE NUMBER OR AMOUNT OBSERVED 02/12/2023 07:47 PM    KETUA >80 02/12/2023 07:47 PM     Urine Cultures: No results found for: Rene Filter  Blood Cultures: No results found for: BC  No results found for: BLOODCULT2  Organism: No results found for: ORG    Time Spent Discharging patient *** minutes    Electronically signed by Dorota Garcia MD on 2/16/2023 at 6:44 PM

## 2023-02-16 NOTE — PROGRESS NOTES
Consult called to Dr. Mason Bourgeois,Ophthalmologist. For Right Eye pain. Spoke to his nurse and consult accepted.

## 2023-02-16 NOTE — PROGRESS NOTES
5:53 pm discussed with Dr. Mendy Friend (Ophthalmology) regarding his evaluation. Recommended transfer to [facility] for possible endophthalmitis for further evaluation and management. Called Transfer center at 6pm to initiate transfer. Received call back from [facility] transfer center at 6:21pm and they are going to page ophthalmology. Awaiting call back. Addendum  6:35pm received call back from [facility] Ophthalmology, who accepted the patient. Will place discharge order pending transfer to [facility].

## 2023-02-16 NOTE — CARE COORDINATION
Per CM note patient now requesting to go home and no appeal to be initiated. ARU will stop following.

## 2023-02-16 NOTE — PROGRESS NOTES
Nephrology Progress Note        [address], [address] [address]  Phone: [phone]  Office Hours: 8:30AM - 4:30PM  Monday - Friday 2/16/2023 6:59 AM  Subjective:   Admit Date: 2/12/2023  PCP: No primary care provider on file. Interval History:   Resting on nc  Laying flat in bed    Diet: ADULT DIET; Regular; 5 carb choices (75 gm/meal)      Data:   Scheduled Meds:   insulin glargine  15 Units SubCUTAneous Daily with breakfast    insulin lispro  5 Units SubCUTAneous TID WC    nafcillin 12,000 mg in sodium chloride 0.9% 500 mL infusion  12,000 mg IntraVENous Q24H    furosemide  40 mg Oral Daily    carvedilol  6.25 mg Oral BID WC    heparin (porcine)  5,000 Units SubCUTAneous 3 times per day    sodium chloride flush  5-40 mL IntraVENous 2 times per day    insulin lispro  0-8 Units SubCUTAneous TID WC    insulin lispro  0-4 Units SubCUTAneous Nightly     Continuous Infusions:   sodium bicarbonate infusion 75 mL/hr at 02/15/23 2118    sodium chloride      dextrose       PRN Meds:ondansetron, sodium chloride flush, sodium chloride, acetaminophen **OR** acetaminophen, glucose, dextrose bolus **OR** dextrose bolus, glucagon (rDNA), dextrose  I/O last 3 completed shifts:   In: 2280.9 [I.V.:2021.8; IV Piggyback:259.2]  Out: 7196 [Urine:4025]  I/O this shift:  In: -   Out: 9096 [Urine:1550]    Intake/Output Summary (Last 24 hours) at 2/16/2023 0659  Last data filed at 2/16/2023 0519  Gross per 24 hour   Intake 2280.94 ml   Output 3450 ml   Net -1169.06 ml       CBC:   Recent Labs     02/14/23  0044 02/15/23  0614   WBC 5.9 5.5   HGB 10.7* 9.7*    161       BMP:    Recent Labs     02/14/23  0044 02/15/23  0614   * 136   K 3.6 3.5   CL 94* 99   CO2 22 27   BUN 30* 28*   CREATININE 2.0* 1.7*   GLUCOSE 270* 245*     Hepatic:   Recent Labs     02/14/23 0044 02/15/23  0614   * 129*   ALT 30 24   BILITOT 0.6 0.3   ALKPHOS 69 67         Objective:   Vitals: BP (!) 106/51 Pulse 66   Temp 98.8 °F (37.1 °C) (Oral)   Resp 25   Ht 5' 5\" (1.651 m)   Wt (!) 309 lb 11.9 oz (140.5 kg)   SpO2 97%   BMI 51.54 kg/m²   General appearance:  in no acute distress  HEENT: normocephalic, atraumatic,   Neck: supple, trachea midline  Lungs: breathing comfortably on nc  Extremities: extremities atraumatic, no cyanosis or edema      MEDICAL DECISION MAKING     -RAMON from ATN in setting of volume depletion and rhabdo; cr 1.3 from normal baseline//no HN on CT AP//UA without rbcs or wbcs  -Hyponatremia  -Staph bacteremia  -Proteinuria on UA; recheck once RAMON resolves  -Transaminitis  -Rhabdo s/p being found on the floor ; TSH and phos wnl  -CHF hx  -DM2     Suggest:  -Obtain BMP and CK level  -Considering hr chf hx, I will stop the bicarb drip today  -Continue po lasix  -Avoid nephrotoxins                  Electronically signed by Shasta Wen DO on 2/16/2023 at [address] DO [address],  [address]  [place], [place] [address]  PHONE: [phone]  FAX: [phone]

## 2023-02-16 NOTE — PROGRESS NOTES
Infectious Disease Progress Note  2023   Patient Name: Nithin Sosa : 1965   Impression  Severe Sepsis Secondary to MSSA Bacteremia Possible Pneumonia:  BREE Lung Nodule:  Cholelithiasis without Cholecystitis:  Afebrile, no leukocytosis, Pct and CRP on DWT  CrCl: 52  AST elevated 145, 224 likely from rhabdomyolysis   -Rapid Flu, COVID-19 Negative  -BC 4/ MSSA   -BC  MSSA  -BC Pending  -MSSA Screen positive  -Urine culture: Mixed skin/urogenital carie  -CT Chest A&P W contrast: 1. Linear bands of atelectasis/scarring in the right lower chest and some   scattered calcified granulomas without signs of pneumonia. 2.  A noncalcified 3 mm nodule in the left upper lobe would not require   further workup in a low risk patient. 3.  The liver is enlarged and portion of the right lateral abdomen including   the edge of the liver is cut off the edge of the exam.  No apparent mass or   nodularity within the liver. 4.  There is cholelithiasis but without fat stranding around the gallbladder   fossa. 5.  No bowel obstruction, pneumoperitoneum, or sign of diverticulitis. Some   gastritis in the stomach is suspected. 6.  Some stranding around both kidneys appear symmetric and may relate to   chronic renal disease. There is no hydronephrosis or hydroureter. 7.  Fatty atrophy of the pancreas. -JONI: No evidence of endocarditis. RAMON:  Rhabdomyolysis s/p Fall:  Dr. Romeo Greene onboard     DMII:  Chronic Hypoxic Respiratory Failure:   Baseline oxygen 2L/min/nc  HTN/ HFpEF/ PHTN:  Morbid Obesity: BMI:  50.75  Multi-morbidity: per PMHx     Plan:  Continue IV continuous nafcillin gtt 12,000 mg every 24 hours  Trend CRP and Pct, ordered  Repeat BC q48h until negative  Await repeat BC   Ordered CBC with dif for am    Ongoing Antimicrobial Therapy  Nafcillin 2/15-? Completed Antimicrobial Therapy  Zosyn ? ? Vancomycin 2/12-15  History:? Interval history noted.   Chief complaint: Sepsis secondary to MSSA bacteremia. Denies n/v/d/f or untoward effects of antibiotics  Physical Exam:  Vital Signs: /76   Pulse 84   Temp 98.2 °F (36.8 °C) (Oral)   Resp 30   Ht 5' 5\" (1.651 m)   Wt (!) 309 lb 11.9 oz (140.5 kg)   SpO2 97%   BMI 51.54 kg/m²     Gen: alert and oriented, no distress in the bed, right eye with slight erythema surrounding eye  Chest: no distress and CTA. Good air movement. Oxygen per NC. Heart: RRR and no MRG. Abd: soft, non-distended, no tenderness, no hepatomegaly. Normoactive bowel sounds. Ext: no clubbing, cyanosis, or edema  Neuro: Mental status intact. CN 2-12 intact and no focal sensory or motor deficits     Radiologic / Imaging / TESTING  2/12/2023 CT Head WO Contrast:  Impression   No acute intracranial abnormality. 2/12/2023 CT Cervical Spine WO Contrast:  Impression   No acute abnormality of the cervical spine. 2/12/2023 XR Chest Portable:  Impression   1. Cardiomegaly and findings favored to reflect pulmonary edema. 2/12/2023 CT Chest Abdomen Pelvis W Contrast:  Impression   1. Linear bands of atelectasis/scarring in the right lower chest and some   scattered calcified granulomas without signs of pneumonia. 2.  A noncalcified 3 mm nodule in the left upper lobe would not require   further workup in a low risk patient. 3.  The liver is enlarged and portion of the right lateral abdomen including   the edge of the liver is cut off the edge of the exam.  No apparent mass or   nodularity within the liver. 4.  There is cholelithiasis but without fat stranding around the gallbladder   fossa. 5.  No bowel obstruction, pneumoperitoneum, or sign of diverticulitis. Some   gastritis in the stomach is suspected. 6.  Some stranding around both kidneys appear symmetric and may relate to   chronic renal disease. There is no hydronephrosis or hydroureter. 7.  Fatty atrophy of the pancreas.       2/13/2023 XR Chest Portable;  Impression   Linear changes in the right lung base which could represent atelectasis or   infiltrate. Small right effusion. Follow up to resolution is suggested. 2/14/2023 JONI:   Summary   Mild to moderate aortic regurgitation. There is a small (trivial) pericardial effusion. No evidence of thrombus within the left atrial appendage. No evidence of endocarditis noted.    small pericardial effusion        Labs:    Recent Results (from the past 24 hour(s))   POCT Glucose    Collection Time: 02/15/23  5:14 PM   Result Value Ref Range    POC Glucose 289 (H) 70 - 99 MG/DL   POCT Glucose    Collection Time: 02/15/23  8:38 PM   Result Value Ref Range    POC Glucose 282 (H) 70 - 99 MG/DL   C-Reactive Protein    Collection Time: 02/16/23  6:57 AM   Result Value Ref Range    CRP High Sensitivity 128.2 (H) <5.0 mg/L   CK    Collection Time: 02/16/23  7:42 AM   Result Value Ref Range    Total CK 3,053 (H) 26 - 140 IU/L   Basic Metabolic Panel    Collection Time: 02/16/23  7:42 AM   Result Value Ref Range    Sodium 136 135 - 145 MMOL/L    Potassium 3.4 (L) 3.5 - 5.1 MMOL/L    Chloride 96 (L) 99 - 110 mMol/L    CO2 28 21 - 32 MMOL/L    Anion Gap 12 4 - 16    BUN 29 (H) 6 - 23 MG/DL    Creatinine 1.6 (H) 0.6 - 1.1 MG/DL    Est, Glom Filt Rate 37 (L) >60 mL/min/1.73m2    Glucose 311 (H) 70 - 99 MG/DL    Calcium 7.5 (L) 8.3 - 10.6 MG/DL   POCT Glucose    Collection Time: 02/16/23  8:48 AM   Result Value Ref Range    POC Glucose 371 (H) 70 - 99 MG/DL   POCT Glucose    Collection Time: 02/16/23 12:11 PM   Result Value Ref Range    POC Glucose 301 (H) 70 - 99 MG/DL     CULTURE results: Invalid input(s): BLOOD CULTURE,  URINE CULTURE, SURGICAL CULTURE    Diagnosis:  Patient Active Problem List   Diagnosis    Severe sepsis without septic shock ([address]    Accidental fall from bed    Sepsis due to methicillin susceptible Staphylococcus aureus (MSSA) without acute organ dysfunction (HCC)    Staphylococcus aureus bacteremia    Class 3 severe obesity due to excess calories with serious comorbidity and body mass index (BMI) of 50.0 to 59.9 in adult [facility])       Active Problems  Principal Problem:    Severe sepsis without septic shock (HCC)  Active Problems:    Accidental fall from bed    Sepsis due to methicillin susceptible Staphylococcus aureus (MSSA) without acute organ dysfunction (HCC)    Staphylococcus aureus bacteremia    Class 3 severe obesity due to excess calories with serious comorbidity and body mass index (BMI) of 50.0 to 59.9 in [facility])  Resolved Problems:    * No resolved hospital problems. *    Electronically signed by: Electronically signed by Ramses Horn.  MASON Bryan CNP on 2/16/2023 at 4:30 PM

## 2023-02-17 LAB
CULTURE: ABNORMAL
Lab: ABNORMAL
SPECIMEN: ABNORMAL

## 2023-02-17 NOTE — PROGRESS NOTES
[facility] here to pick patient up. Patient walked to cart with walker. Patient alert and stable at time of discharge. No distress noted. All personal belongings sent with patient.

## 2023-02-17 NOTE — PROGRESS NOTES
[facility] [address]. Informed her transport should be here at facility at 2230. Gave nurse phone number for any follow-up questions.

## 2023-02-17 NOTE — PROGRESS NOTES
Lab just called, the first set of blood cultures that were re-drawn today, have come back positive for   Gram + cocci.  RN aware, note relayed to hospitalist.

## 2023-02-17 NOTE — PLAN OF CARE
Problem: Discharge Planning  Goal: Discharge to home or other facility with appropriate resources  Outcome: Progressing  Flowsheets (Taken 2/16/2023 2015)  Discharge to home or other facility with appropriate resources:   Identify barriers to discharge with patient and caregiver   Arrange for needed discharge resources and transportation as appropriate     Problem: Skin/Tissue Integrity  Goal: Absence of new skin breakdown  Description: 1. Monitor for areas of redness and/or skin breakdown  2. Assess vascular access sites hourly  3. Every 4-6 hours minimum:  Change oxygen saturation probe site  4. Every 4-6 hours:  If on nasal continuous positive airway pressure, respiratory therapy assess nares and determine need for appliance change or resting period.   Outcome: Progressing     Problem: Safety - Adult  Goal: Free from fall injury  Outcome: Progressing     Problem: Pain  Goal: Verbalizes/displays adequate comfort level or baseline comfort level  Outcome: Progressing  Flowsheets (Taken 2/16/2023 2015)  Verbalizes/displays adequate comfort level or baseline comfort level:   Encourage patient to monitor pain and request assistance   Assess pain using appropriate pain scale     Problem: ABCDS Injury Assessment  Goal: Absence of physical injury  Outcome: Progressing

## 2023-02-17 NOTE — PROGRESS NOTES
Access center called with bed assignment @ [facility] and transport info as follows:    [facility]  Report #  [id number]    Transport to  @ 10:30pm tonight.

## 2023-02-18 LAB
CULTURE: ABNORMAL
CULTURE: ABNORMAL
Lab: ABNORMAL
SPECIMEN: ABNORMAL

## 2023-03-11 ENCOUNTER — APPOINTMENT (OUTPATIENT)
Dept: GENERAL RADIOLOGY | Age: 58
DRG: 280 | End: 2023-03-11
Payer: MEDICARE

## 2023-03-11 ENCOUNTER — HOSPITAL ENCOUNTER (INPATIENT)
Age: 58
LOS: 5 days | Discharge: NURSING, REHAB OR LONG-TERM CARE | DRG: 280 | End: 2023-03-16
Attending: EMERGENCY MEDICINE | Admitting: STUDENT IN AN ORGANIZED HEALTH CARE EDUCATION/TRAINING PROGRAM
Payer: MEDICARE

## 2023-03-11 DIAGNOSIS — J96.21 ACUTE ON CHRONIC RESPIRATORY FAILURE WITH HYPOXEMIA (HCC): ICD-10-CM

## 2023-03-11 DIAGNOSIS — D64.9 ANEMIA, UNSPECIFIED TYPE: ICD-10-CM

## 2023-03-11 DIAGNOSIS — J81.0 ACUTE PULMONARY EDEMA (HCC): Primary | ICD-10-CM

## 2023-03-11 PROBLEM — I50.33 ACUTE ON CHRONIC HEART FAILURE WITH PRESERVED EJECTION FRACTION (HFPEF) (HCC): Status: ACTIVE | Noted: 2023-03-11

## 2023-03-11 LAB
ALBUMIN SERPL-MCNC: 3.3 GM/DL (ref 3.4–5)
ALP BLD-CCNC: 113 IU/L (ref 40–129)
ALT SERPL-CCNC: <5 U/L (ref 10–40)
ANION GAP SERPL CALCULATED.3IONS-SCNC: 9 MMOL/L (ref 4–16)
AST SERPL-CCNC: 12 IU/L (ref 15–37)
BASE EXCESS MIXED: 8.9 (ref 0–2.3)
BASOPHILS ABSOLUTE: 0 K/CU MM
BASOPHILS RELATIVE PERCENT: 0.6 % (ref 0–1)
BILIRUB SERPL-MCNC: 0.4 MG/DL (ref 0–1)
BUN SERPL-MCNC: 19 MG/DL (ref 6–23)
CALCIUM SERPL-MCNC: 8.4 MG/DL (ref 8.3–10.6)
CHLORIDE BLD-SCNC: 99 MMOL/L (ref 99–110)
CO2: 34 MMOL/L (ref 21–32)
COMMENT: ABNORMAL
CREAT SERPL-MCNC: 1.1 MG/DL (ref 0.6–1.1)
DIFFERENTIAL TYPE: ABNORMAL
EOSINOPHILS ABSOLUTE: 0.3 K/CU MM
EOSINOPHILS RELATIVE PERCENT: 4.2 % (ref 0–3)
GFR SERPL CREATININE-BSD FRML MDRD: 59 ML/MIN/1.73M2
GLUCOSE SERPL-MCNC: 209 MG/DL (ref 70–99)
HCO3 VENOUS: 35.5 MMOL/L (ref 19–25)
HCT VFR BLD CALC: 30.9 % (ref 37–47)
HEMOGLOBIN: 8.8 GM/DL (ref 12.5–16)
IMMATURE NEUTROPHIL %: 0.7 % (ref 0–0.43)
LACTIC ACID, SEPSIS: 2.5 MMOL/L (ref 0.5–1.9)
LYMPHOCYTES ABSOLUTE: 1 K/CU MM
LYMPHOCYTES RELATIVE PERCENT: 14.6 % (ref 24–44)
MCH RBC QN AUTO: 26.8 PG (ref 27–31)
MCHC RBC AUTO-ENTMCNC: 28.5 % (ref 32–36)
MCV RBC AUTO: 94.2 FL (ref 78–100)
MONOCYTES ABSOLUTE: 0.5 K/CU MM
MONOCYTES RELATIVE PERCENT: 7.6 % (ref 0–4)
NUCLEATED RBC %: 0 %
O2 SAT, VEN: 93.4 % (ref 50–70)
PCO2, VEN: 56 MMHG (ref 38–52)
PDW BLD-RTO: 15.1 % (ref 11.7–14.9)
PH VENOUS: 7.41 (ref 7.32–7.42)
PLATELET # BLD: 189 K/CU MM (ref 140–440)
PMV BLD AUTO: 10.8 FL (ref 7.5–11.1)
PO2, VEN: 186 MMHG (ref 28–48)
POTASSIUM SERPL-SCNC: 4.1 MMOL/L (ref 3.5–5.1)
PRO-BNP: 8871 PG/ML
PROCALCITONIN SERPL-MCNC: 0.1 NG/ML
RBC # BLD: 3.28 M/CU MM (ref 4.2–5.4)
SEGMENTED NEUTROPHILS ABSOLUTE COUNT: 4.9 K/CU MM
SEGMENTED NEUTROPHILS RELATIVE PERCENT: 72.3 % (ref 36–66)
SODIUM BLD-SCNC: 142 MMOL/L (ref 135–145)
TOTAL IMMATURE NEUTOROPHIL: 0.05 K/CU MM
TOTAL NUCLEATED RBC: 0 K/CU MM
TOTAL PROTEIN: 7 GM/DL (ref 6.4–8.2)
TROPONIN T: 0.06 NG/ML
WBC # BLD: 6.7 K/CU MM (ref 4–10.5)

## 2023-03-11 PROCEDURE — 6360000002 HC RX W HCPCS: Performed by: EMERGENCY MEDICINE

## 2023-03-11 PROCEDURE — 85025 COMPLETE CBC W/AUTO DIFF WBC: CPT

## 2023-03-11 PROCEDURE — 83605 ASSAY OF LACTIC ACID: CPT

## 2023-03-11 PROCEDURE — 96374 THER/PROPH/DIAG INJ IV PUSH: CPT

## 2023-03-11 PROCEDURE — 1200000000 HC SEMI PRIVATE

## 2023-03-11 PROCEDURE — 82805 BLOOD GASES W/O2 SATURATION: CPT

## 2023-03-11 PROCEDURE — 93005 ELECTROCARDIOGRAM TRACING: CPT | Performed by: EMERGENCY MEDICINE

## 2023-03-11 PROCEDURE — 99285 EMERGENCY DEPT VISIT HI MDM: CPT

## 2023-03-11 PROCEDURE — 87040 BLOOD CULTURE FOR BACTERIA: CPT

## 2023-03-11 PROCEDURE — 84484 ASSAY OF TROPONIN QUANT: CPT

## 2023-03-11 PROCEDURE — 71045 X-RAY EXAM CHEST 1 VIEW: CPT

## 2023-03-11 PROCEDURE — 83880 ASSAY OF NATRIURETIC PEPTIDE: CPT

## 2023-03-11 PROCEDURE — 80053 COMPREHEN METABOLIC PANEL: CPT

## 2023-03-11 PROCEDURE — 84145 PROCALCITONIN (PCT): CPT

## 2023-03-11 RX ORDER — ATORVASTATIN CALCIUM 10 MG/1
20 TABLET, FILM COATED ORAL EVERY EVENING
Status: DISCONTINUED | OUTPATIENT
Start: 2023-03-12 | End: 2023-03-16 | Stop reason: HOSPADM

## 2023-03-11 RX ORDER — INSULIN LISPRO 100 [IU]/ML
10 INJECTION, SOLUTION INTRAVENOUS; SUBCUTANEOUS
Status: ON HOLD | COMMUNITY
Start: 2023-02-23 | End: 2023-03-16 | Stop reason: HOSPADM

## 2023-03-11 RX ORDER — INSULIN LISPRO 100 [IU]/ML
10 INJECTION, SOLUTION INTRAVENOUS; SUBCUTANEOUS
Status: DISCONTINUED | OUTPATIENT
Start: 2023-03-12 | End: 2023-03-16 | Stop reason: HOSPADM

## 2023-03-11 RX ORDER — POTASSIUM CHLORIDE 7.45 MG/ML
10 INJECTION INTRAVENOUS PRN
Status: DISCONTINUED | OUTPATIENT
Start: 2023-03-11 | End: 2023-03-16 | Stop reason: HOSPADM

## 2023-03-11 RX ORDER — MAGNESIUM SULFATE IN WATER 40 MG/ML
2000 INJECTION, SOLUTION INTRAVENOUS PRN
Status: DISCONTINUED | OUTPATIENT
Start: 2023-03-11 | End: 2023-03-16 | Stop reason: HOSPADM

## 2023-03-11 RX ORDER — DEXTROSE MONOHYDRATE 100 MG/ML
INJECTION, SOLUTION INTRAVENOUS CONTINUOUS PRN
Status: DISCONTINUED | OUTPATIENT
Start: 2023-03-11 | End: 2023-03-16 | Stop reason: HOSPADM

## 2023-03-11 RX ORDER — LANOLIN ALCOHOL/MO/W.PET/CERES
1000 CREAM (GRAM) TOPICAL DAILY
Status: DISCONTINUED | OUTPATIENT
Start: 2023-03-12 | End: 2023-03-16 | Stop reason: HOSPADM

## 2023-03-11 RX ORDER — ONDANSETRON 4 MG/1
4 TABLET, ORALLY DISINTEGRATING ORAL EVERY 8 HOURS PRN
Status: DISCONTINUED | OUTPATIENT
Start: 2023-03-11 | End: 2023-03-16 | Stop reason: HOSPADM

## 2023-03-11 RX ORDER — BUMETANIDE 0.25 MG/ML
1 INJECTION, SOLUTION INTRAMUSCULAR; INTRAVENOUS 2 TIMES DAILY
Status: DISCONTINUED | OUTPATIENT
Start: 2023-03-12 | End: 2023-03-14

## 2023-03-11 RX ORDER — INSULIN GLARGINE 100 [IU]/ML
30 INJECTION, SOLUTION SUBCUTANEOUS EVERY EVENING
Status: DISCONTINUED | OUTPATIENT
Start: 2023-03-12 | End: 2023-03-16 | Stop reason: HOSPADM

## 2023-03-11 RX ORDER — SODIUM CHLORIDE 9 MG/ML
INJECTION, SOLUTION INTRAVENOUS PRN
Status: DISCONTINUED | OUTPATIENT
Start: 2023-03-11 | End: 2023-03-16 | Stop reason: HOSPADM

## 2023-03-11 RX ORDER — POLYETHYLENE GLYCOL 3350 17 G/17G
17 POWDER, FOR SOLUTION ORAL DAILY PRN
Status: DISCONTINUED | OUTPATIENT
Start: 2023-03-11 | End: 2023-03-16 | Stop reason: HOSPADM

## 2023-03-11 RX ORDER — POTASSIUM CHLORIDE 20 MEQ/1
40 TABLET, EXTENDED RELEASE ORAL PRN
Status: DISCONTINUED | OUTPATIENT
Start: 2023-03-11 | End: 2023-03-16 | Stop reason: HOSPADM

## 2023-03-11 RX ORDER — INSULIN LISPRO 100 [IU]/ML
0-4 INJECTION, SOLUTION INTRAVENOUS; SUBCUTANEOUS NIGHTLY
Status: DISCONTINUED | OUTPATIENT
Start: 2023-03-12 | End: 2023-03-16 | Stop reason: HOSPADM

## 2023-03-11 RX ORDER — SODIUM CHLORIDE 0.9 % (FLUSH) 0.9 %
5-40 SYRINGE (ML) INJECTION PRN
Status: DISCONTINUED | OUTPATIENT
Start: 2023-03-11 | End: 2023-03-16 | Stop reason: HOSPADM

## 2023-03-11 RX ORDER — ONDANSETRON 2 MG/ML
4 INJECTION INTRAMUSCULAR; INTRAVENOUS EVERY 6 HOURS PRN
Status: DISCONTINUED | OUTPATIENT
Start: 2023-03-11 | End: 2023-03-16 | Stop reason: HOSPADM

## 2023-03-11 RX ORDER — ENOXAPARIN SODIUM 100 MG/ML
30 INJECTION SUBCUTANEOUS 2 TIMES DAILY
Status: DISCONTINUED | OUTPATIENT
Start: 2023-03-12 | End: 2023-03-15

## 2023-03-11 RX ORDER — FUROSEMIDE 10 MG/ML
40 INJECTION INTRAMUSCULAR; INTRAVENOUS ONCE
Status: COMPLETED | OUTPATIENT
Start: 2023-03-11 | End: 2023-03-11

## 2023-03-11 RX ORDER — SODIUM CHLORIDE 0.9 % (FLUSH) 0.9 %
5-40 SYRINGE (ML) INJECTION EVERY 12 HOURS SCHEDULED
Status: DISCONTINUED | OUTPATIENT
Start: 2023-03-12 | End: 2023-03-16 | Stop reason: HOSPADM

## 2023-03-11 RX ORDER — INSULIN GLARGINE-YFGN 100 [IU]/ML
30 INJECTION, SOLUTION SUBCUTANEOUS NIGHTLY
Status: ON HOLD | COMMUNITY
Start: 2023-02-24

## 2023-03-11 RX ORDER — INSULIN LISPRO 100 [IU]/ML
0-4 INJECTION, SOLUTION INTRAVENOUS; SUBCUTANEOUS
Status: DISCONTINUED | OUTPATIENT
Start: 2023-03-12 | End: 2023-03-16 | Stop reason: HOSPADM

## 2023-03-11 RX ORDER — ACETAMINOPHEN 650 MG/1
650 SUPPOSITORY RECTAL EVERY 6 HOURS PRN
Status: DISCONTINUED | OUTPATIENT
Start: 2023-03-11 | End: 2023-03-16 | Stop reason: HOSPADM

## 2023-03-11 RX ORDER — DICLOXACILLIN SODIUM 250 MG/1
500 CAPSULE ORAL 4 TIMES DAILY
Status: DISCONTINUED | OUTPATIENT
Start: 2023-03-12 | End: 2023-03-16 | Stop reason: HOSPADM

## 2023-03-11 RX ORDER — ACETAMINOPHEN 325 MG/1
650 TABLET ORAL EVERY 6 HOURS PRN
Status: DISCONTINUED | OUTPATIENT
Start: 2023-03-11 | End: 2023-03-16 | Stop reason: HOSPADM

## 2023-03-11 RX ORDER — CARVEDILOL 6.25 MG/1
6.25 TABLET ORAL 2 TIMES DAILY WITH MEALS
Status: DISCONTINUED | OUTPATIENT
Start: 2023-03-12 | End: 2023-03-16 | Stop reason: HOSPADM

## 2023-03-11 RX ADMIN — FUROSEMIDE 40 MG: 10 INJECTION, SOLUTION INTRAVENOUS at 20:40

## 2023-03-11 ASSESSMENT — ENCOUNTER SYMPTOMS
BACK PAIN: 0
NAUSEA: 0
EYE PAIN: 0
CHOKING: 0
SHORTNESS OF BREATH: 1
CONSTIPATION: 0
DIARRHEA: 0
WHEEZING: 0
CHEST TIGHTNESS: 0
ABDOMINAL PAIN: 0
BLOOD IN STOOL: 0
ABDOMINAL DISTENTION: 0
COUGH: 0
STRIDOR: 0
VOMITING: 0

## 2023-03-11 ASSESSMENT — PAIN - FUNCTIONAL ASSESSMENT: PAIN_FUNCTIONAL_ASSESSMENT: NONE - DENIES PAIN

## 2023-03-12 LAB
ANION GAP SERPL CALCULATED.3IONS-SCNC: 6 MMOL/L (ref 4–16)
BASOPHILS ABSOLUTE: 0 K/CU MM
BASOPHILS RELATIVE PERCENT: 0.6 % (ref 0–1)
BUN SERPL-MCNC: 18 MG/DL (ref 6–23)
CALCIUM SERPL-MCNC: 8.3 MG/DL (ref 8.3–10.6)
CHLORIDE BLD-SCNC: 101 MMOL/L (ref 99–110)
CHOLEST SERPL-MCNC: 97 MG/DL
CO2: 36 MMOL/L (ref 21–32)
CREAT SERPL-MCNC: 1 MG/DL (ref 0.6–1.1)
DIFFERENTIAL TYPE: ABNORMAL
EOSINOPHILS ABSOLUTE: 0.3 K/CU MM
EOSINOPHILS RELATIVE PERCENT: 4.8 % (ref 0–3)
FERRITIN: 160 NG/ML (ref 15–150)
FOLATE SERPL-MCNC: 4.4 NG/ML (ref 3.1–17.5)
GFR SERPL CREATININE-BSD FRML MDRD: >60 ML/MIN/1.73M2
GLUCOSE BLD-MCNC: 175 MG/DL (ref 70–99)
GLUCOSE BLD-MCNC: 189 MG/DL (ref 70–99)
GLUCOSE BLD-MCNC: 192 MG/DL (ref 70–99)
GLUCOSE BLD-MCNC: 210 MG/DL (ref 70–99)
GLUCOSE BLD-MCNC: 250 MG/DL (ref 70–99)
GLUCOSE SERPL-MCNC: 224 MG/DL (ref 70–99)
HCT VFR BLD CALC: 27.3 % (ref 37–47)
HDLC SERPL-MCNC: 35 MG/DL
HEMOGLOBIN: 7.5 GM/DL (ref 12.5–16)
IMMATURE NEUTROPHIL %: 0.6 % (ref 0–0.43)
IRON: 22 UG/DL (ref 37–145)
LDLC SERPL CALC-MCNC: 37 MG/DL
LYMPHOCYTES ABSOLUTE: 1 K/CU MM
LYMPHOCYTES RELATIVE PERCENT: 16.6 % (ref 24–44)
MAGNESIUM: 1.7 MG/DL (ref 1.8–2.4)
MCH RBC QN AUTO: 26.4 PG (ref 27–31)
MCHC RBC AUTO-ENTMCNC: 27.5 % (ref 32–36)
MCV RBC AUTO: 96.1 FL (ref 78–100)
MONOCYTES ABSOLUTE: 0.7 K/CU MM
MONOCYTES RELATIVE PERCENT: 10.9 % (ref 0–4)
NUCLEATED RBC %: 0 %
PCT TRANSFERRIN: 8 % (ref 10–44)
PDW BLD-RTO: 15.2 % (ref 11.7–14.9)
PHOSPHORUS: 2.7 MG/DL (ref 2.5–4.9)
PLATELET # BLD: 180 K/CU MM (ref 140–440)
PMV BLD AUTO: 10.9 FL (ref 7.5–11.1)
POTASSIUM SERPL-SCNC: 4 MMOL/L (ref 3.5–5.1)
RBC # BLD: 2.84 M/CU MM (ref 4.2–5.4)
SEGMENTED NEUTROPHILS ABSOLUTE COUNT: 4.1 K/CU MM
SEGMENTED NEUTROPHILS RELATIVE PERCENT: 66.5 % (ref 36–66)
SODIUM BLD-SCNC: 143 MMOL/L (ref 135–145)
TOTAL IMMATURE NEUTOROPHIL: 0.04 K/CU MM
TOTAL IRON BINDING CAPACITY: 276 UG/DL (ref 250–450)
TOTAL NUCLEATED RBC: 0 K/CU MM
TRIGL SERPL-MCNC: 126 MG/DL
TROPONIN T: 0.06 NG/ML
UNSATURATED IRON BINDING CAPACITY: 254 UG/DL (ref 110–370)
VITAMIN B-12: 224.5 PG/ML (ref 211–911)
WBC # BLD: 6.2 K/CU MM (ref 4–10.5)

## 2023-03-12 PROCEDURE — 83540 ASSAY OF IRON: CPT

## 2023-03-12 PROCEDURE — 80061 LIPID PANEL: CPT

## 2023-03-12 PROCEDURE — 82607 VITAMIN B-12: CPT

## 2023-03-12 PROCEDURE — 82728 ASSAY OF FERRITIN: CPT

## 2023-03-12 PROCEDURE — 6360000002 HC RX W HCPCS: Performed by: INTERNAL MEDICINE

## 2023-03-12 PROCEDURE — 2700000000 HC OXYGEN THERAPY PER DAY

## 2023-03-12 PROCEDURE — 6370000000 HC RX 637 (ALT 250 FOR IP): Performed by: INTERNAL MEDICINE

## 2023-03-12 PROCEDURE — 2500000003 HC RX 250 WO HCPCS: Performed by: INTERNAL MEDICINE

## 2023-03-12 PROCEDURE — 84100 ASSAY OF PHOSPHORUS: CPT

## 2023-03-12 PROCEDURE — 82746 ASSAY OF FOLIC ACID SERUM: CPT

## 2023-03-12 PROCEDURE — 36415 COLL VENOUS BLD VENIPUNCTURE: CPT

## 2023-03-12 PROCEDURE — 93005 ELECTROCARDIOGRAM TRACING: CPT | Performed by: INTERNAL MEDICINE

## 2023-03-12 PROCEDURE — 83550 IRON BINDING TEST: CPT

## 2023-03-12 PROCEDURE — 80048 BASIC METABOLIC PNL TOTAL CA: CPT

## 2023-03-12 PROCEDURE — 85025 COMPLETE CBC W/AUTO DIFF WBC: CPT

## 2023-03-12 PROCEDURE — 1200000000 HC SEMI PRIVATE

## 2023-03-12 PROCEDURE — 82962 GLUCOSE BLOOD TEST: CPT

## 2023-03-12 PROCEDURE — 94761 N-INVAS EAR/PLS OXIMETRY MLT: CPT

## 2023-03-12 PROCEDURE — 84484 ASSAY OF TROPONIN QUANT: CPT

## 2023-03-12 PROCEDURE — 83735 ASSAY OF MAGNESIUM: CPT

## 2023-03-12 PROCEDURE — 2580000003 HC RX 258: Performed by: INTERNAL MEDICINE

## 2023-03-12 RX ADMIN — CARVEDILOL 6.25 MG: 6.25 TABLET, FILM COATED ORAL at 10:54

## 2023-03-12 RX ADMIN — BUMETANIDE 1 MG: 0.25 INJECTION INTRAMUSCULAR; INTRAVENOUS at 21:04

## 2023-03-12 RX ADMIN — ENOXAPARIN SODIUM 30 MG: 100 INJECTION SUBCUTANEOUS at 10:54

## 2023-03-12 RX ADMIN — CARVEDILOL 6.25 MG: 6.25 TABLET, FILM COATED ORAL at 17:35

## 2023-03-12 RX ADMIN — ENOXAPARIN SODIUM 30 MG: 100 INJECTION SUBCUTANEOUS at 21:01

## 2023-03-12 RX ADMIN — INSULIN GLARGINE 30 UNITS: 100 INJECTION, SOLUTION SUBCUTANEOUS at 00:30

## 2023-03-12 RX ADMIN — INSULIN GLARGINE 30 UNITS: 100 INJECTION, SOLUTION SUBCUTANEOUS at 21:02

## 2023-03-12 RX ADMIN — CYANOCOBALAMIN TAB 1000 MCG 1000 MCG: 1000 TAB at 10:54

## 2023-03-12 RX ADMIN — DICLOXACILLIN SODIUM 500 MG: 250 CAPSULE ORAL at 00:45

## 2023-03-12 RX ADMIN — SODIUM CHLORIDE, PRESERVATIVE FREE 10 ML: 5 INJECTION INTRAVENOUS at 11:05

## 2023-03-12 RX ADMIN — ATORVASTATIN CALCIUM 20 MG: 10 TABLET, FILM COATED ORAL at 21:00

## 2023-03-12 RX ADMIN — BUMETANIDE 1 MG: 0.25 INJECTION INTRAMUSCULAR; INTRAVENOUS at 11:02

## 2023-03-12 RX ADMIN — INSULIN LISPRO 10 UNITS: 100 INJECTION, SOLUTION INTRAVENOUS; SUBCUTANEOUS at 17:33

## 2023-03-12 RX ADMIN — DICLOXACILLIN SODIUM 500 MG: 250 CAPSULE ORAL at 05:30

## 2023-03-12 RX ADMIN — SODIUM CHLORIDE, PRESERVATIVE FREE 10 ML: 5 INJECTION INTRAVENOUS at 21:01

## 2023-03-12 RX ADMIN — ATORVASTATIN CALCIUM 20 MG: 10 TABLET, FILM COATED ORAL at 00:22

## 2023-03-12 RX ADMIN — ENOXAPARIN SODIUM 30 MG: 100 INJECTION SUBCUTANEOUS at 00:30

## 2023-03-12 NOTE — H&P
History and Physical Exam    Patient:  Eleanor Penaloza 62 y.o. female MRN: [id number]     Date of Service: 3/11/2023    Hospital Day: 1      Chief complaint: had concerns including Shortness of Breath (Recently discharged from an ECF. States that upon arriving home pt started to have SOB w/ Wheezing. 2 Albuterol, 1 Atrovent given by EMS with positive effect. ). Assessment and Plan   Eleanor Penaloza, a 62 y.o. female, with a history of IDDM, Class III Obesity, HFpEF, recent MSSA bacteremia with Endophthalmitis Rt. eye, RAMON, rhabdo, normocytic anemia due to anemia of chronic disease and B12 deficiency, COPD on 2 L/min home oxygen, hypertension, SAAD was admitted on 3/11/2023. They had concerns including Shortness of Breath (Recently discharged from an ECF. States that upon arriving home pt started to have SOB w/ Wheezing. 2 Albuterol, 1 Atrovent given by EMS with positive effect. ). Assessment  Acute on chronic decompensated HFpEF  She has a history of CHF  Presented today with shortness of breath -she was just discharged home from [facility] today after recent hospitalization at [facility]  Progressively worsening swelling in her legs -3+ bilaterally for me  Of note, she states that after she was discharged home, she went home and ate a large meatball sub.   And she had not taken her Lasix on the day of admission  proBNP elevated at 8871 -which is above her last reading of 1415 on 2/2023  Chest x-ray with interstitial edema with bilateral small pleural effusions with what appears to be alveolar edema as well  Was given Lasix 40 mg IV x1 in the ER    Acute on chronic hypoxic and hypercapnic respiratory failure 2/2 above, probable SAAD/OHS, doubt infection  Patient is on baseline 2 L/min oxygen at home  Apparently, patient was found hypoxemic by EMS and was given breathing treatment and increased her nasal cannula to 4 L/min  She has no white count and her Pro-Kamron is negative  Blood cultures were sent by the emergency department  PCO2 56 on initial ABG    NSTEMI type II 2/2 likely demand ischemia 2/2 decompensated heart failure  Troponin elevated 0.060, no chest pain    Lactic acidemia 2/2 likely type B 2/2 decompensated heart failure  Lactic acid is 2.5, repeat pending    Normocytic anemia 2/2 anemia of chronic disease and B12 deficiency  Hemoglobin is 8.8.   She was started on B12 supplements recently    History of COPD on 2 L/min home oxygen    History of hypertension, hyperlipidemia    Insulin-dependent diabetes mellitus -recently started on insulin  Most recent A1c-9.5% (2/2023) despite low hemoglobin  At [facility], she was started on Lantus 30 units along with 10 units 3 times daily of Humalog with meals    Recent MSSA bacteremia and right eye endophthalmitis  She was recently here in [facility] and was transferred to ICU due to MSSA bacteremia with right eye endophthalmitis  She was supposed to be on oxacillin IV, however, because the patient was refusing it-this was changed to oral dicloxacillin 500 mg 4 times daily with an end date of 3/18/2023 by infectious disease at [facility]    Recent RAMON and rhabdomyolysis    Class III obesity  BMI 53.25      Plan  Admit to MedSurg with telemetry  F/U repeat lactate  Start on Bumex 1 mg IV twice daily  Wean oxygen as able  Resume her home meds  Obtain proBNP every third day, consult heart failure nurse  Repeat troponin, obtain EKG  Consider cardiology consult  Regular diet with no added salt  Resume home meds-see orders  Resume her prescribed dicloxacillin 500 mg 4 times daily p.o. with an end date of 3/18/2023  Obtain an echocardiogram-last echo that she had was a JONI which did not mention her ejection fraction  Elevate legs and elevate head of bed  Resume Lantus 30 units at bedtime with 10 units 3 times daily premeals Humalog with a low-dose sliding scale, before meals and at bedtime sugar checks and hypoglycemia protocol  Wean oxygen as able  Obtain lipid panel, ferritin, B12      # Peptic ulcer prophylaxis: -   # DVT Prophylaxis: Enoxaparin   #CODE STATUS: Full       Current living situation: Home  Expected Disposition: Home  Estimated discharge date: 2-3 days. History of Present Illness   Nicola Rocha, a 62 y.o. female, who presented on 3/11/2023 with complaints of sudden onset shortness of breath x1 day with progressive leg swelling x2-3 days. According to the patient, she was transferred from [facility] to [facility] on 2/16/2023. From [facility] she was recently discharged to an F and had actually been discharged from [facility] to her house in the a.m. of 3/11/2023. Her leg swelling is getting progressively worse during this, however, she was otherwise feeling okay. When she got home, she states that she ate a large meatball sub. 1-2 hours after that, she started developing extensive wheezing with shortness of breath. This is what prompted her visit to the emergency room. ED Course: Patient was found to have a heart rate of 82, blood pressure 147/87, normal temperature. She was initially saturating 83%, subsequently that came up to 97% on 3-4 L/min. Her BMP was relatively benign. She did have a lactic acid of 2.5. Her Pro-Kamron was negative. Her proBNP was 8871 and her troponin was 0.060. Her LFTs showed an albumin of 3.3 but otherwise normal.  Her sugars were elevated tonight. She had no white count. Her hemoglobin was 8.8. She underwent an ABG that showed a pH of 7.41, PCO2 56, PO2 of 186. She underwent a chest x-ray which showed interstitial edema, small bilateral pleural effusions and some, what appeared to be, alveolar edema. ED Meds: Patient was given Lasix 40 mg IV x1  ED Fluids: Patient was given no fluids    Past Medical History:      Diagnosis Date    Sepsis [facility])        Past Surgical History:    History reviewed. No pertinent surgical history. Medications Prior to Admission:    Prior to Admission medications    Medication Sig Start Date End Date Taking? Authorizing Provider   cyanocobalamin 100 MCG tablet Take 1,000 mcg by mouth daily 2/24/23  Yes Historical Provider, MD   insulin glargine-yfgn (SEMGLEE-YFGN) 100 UNIT/ML injection vial Inject 30 Units into the skin nightly 2/24/23  Yes Historical Provider, MD   insulin lispro, 1 Unit Dial, (HUMALOG/ADMELOG) 100 UNIT/ML SOPN Inject 10 Units into the skin 3 times daily (with meals) 2/23/23  Yes Historical Provider, MD   carvedilol (COREG) 6.25 MG tablet Take 6.25 mg by mouth 2 times daily (with meals)    Historical Provider, MD   furosemide (LASIX) 40 MG tablet Take 40 mg by mouth daily    Historical Provider, MD   glipiZIDE (GLUCOTROL XL) 10 MG extended release tablet Take 10 mg by mouth daily    Historical Provider, MD   metFORMIN (GLUCOPHAGE) 1000 MG tablet Take 1,000 mg by mouth 2 times daily (with meals)    Historical Provider, MD       Allergies:  Patient has no known allergies. Social History:   TOBACCO:   reports that she has never smoked. She has never used smokeless tobacco.  ETOH:   reports that she does not currently use alcohol. Family History:   History reviewed. No pertinent family history. Review of System     Review of Systems   Constitutional:  Negative for chills, fatigue, fever and unexpected weight change. Eyes:  Negative for pain and visual disturbance. Respiratory:  Positive for shortness of breath. Negative for cough, choking, chest tightness, wheezing and stridor. Cardiovascular:  Positive for leg swelling. Negative for chest pain and palpitations. Gastrointestinal:  Negative for abdominal distention, abdominal pain, blood in stool, constipation, diarrhea, nausea and vomiting. Genitourinary:  Negative for decreased urine volume, dysuria, frequency, hematuria and urgency. Musculoskeletal:  Negative for arthralgias, back pain and myalgias. Skin:  Negative for pallor and rash.    Neurological:  Negative for dizziness, tremors, syncope, speech difficulty, weakness, light-headedness, numbness and headaches.   Psychiatric/Behavioral:  Negative for agitation.      I have reviewed all pertinent PMHx, PSHx, FamHx, SocialHx, medications, and allergies and updated history as appropriate.    Physical Exam   VITAL SIGNS:  BP (!) 153/90   Pulse 82   Temp 97.8 °F (36.6 °C) (Oral)   Resp 20   Wt (!) 320 lb (145.2 kg)   LMP  (LMP Unknown)   SpO2 97%   BMI 53.25 kg/m²   Tmax over 24 hours:  Temp (24hrs), Av.8 °F (36.6 °C), Min:97.8 °F (36.6 °C), Max:97.8 °F (36.6 °C)      Patient Vitals for the past 6 hrs:   BP Temp Temp src Pulse Resp SpO2 Weight   230 (!) 153/90 -- -- -- -- -- --   23 1932 (!) 147/87 -- -- 82 -- 97 % --   23 1907 -- -- -- -- -- -- (!) 320 lb (145.2 kg)   23 1905 120/73 97.8 °F (36.6 °C) Oral 99 20 (!) 83 % --       No intake or output data in the 24 hours ending 23 2223  Wt Readings from Last 2 Encounters:   23 (!) 320 lb (145.2 kg)   23 (!) 309 lb 11.9 oz (140.5 kg)     Body mass index is 53.25 kg/m².      Physical Exam  Vitals and nursing note reviewed.   Constitutional:       General: She is not in acute distress.     Appearance: She is obese. She is not ill-appearing, toxic-appearing or diaphoretic.   HENT:      Head: Normocephalic and atraumatic.      Right Ear: External ear normal.      Left Ear: External ear normal.      Nose: Nose normal.      Mouth/Throat:      Pharynx: Oropharynx is clear. No oropharyngeal exudate or posterior oropharyngeal erythema.   Eyes:      General: No scleral icterus.        Right eye: No discharge.         Left eye: No discharge.      Conjunctiva/sclera: Conjunctivae normal.   Cardiovascular:      Rate and Rhythm: Normal rate and regular rhythm.      Pulses: Normal pulses.      Heart sounds: Normal heart sounds. No murmur heard.    No friction rub. No gallop.   Pulmonary:      Effort: Pulmonary effort is normal. No respiratory distress.      Breath sounds: No stridor. Rales  present. No wheezing or rhonchi. Abdominal:      General: Bowel sounds are normal. There is no distension. Palpations: Abdomen is soft. There is no mass. Tenderness: There is no abdominal tenderness. There is no guarding or rebound. Hernia: No hernia is present. Musculoskeletal:      Right lower leg: 3+ Pitting Edema present. Left lower leg: 3+ Pitting Edema present. Skin:     General: Skin is warm. Capillary Refill: Capillary refill takes less than 2 seconds. Neurological:      Mental Status: She is alert and oriented to person, place, and time. Psychiatric:         Mood and Affect: Mood normal.     Labs and Imaging Studies   Laboratory findings:  Complete Blood Count:   Recent Labs     03/11/23 1920   WBC 6.7   HGB 8.8*   HCT 30.9*           Last 3 Blood Glucose:   Recent Labs     03/11/23 1920   GLUCOSE 209*        PT/INR:  No results found for: PROTIME, INR  PTT:  No results found for: APTT, PTT    Comprehensive Metabolic Profile:   Recent Labs     03/11/23 1920      K 4.1   CL 99   CO2 34*   BUN 19   CREATININE 1.1   GLUCOSE 209*   CALCIUM 8.4   PROT 7.0   LABALBU 3.3*   BILITOT 0.4   ALKPHOS 113   AST 12*   ALT <5*      Magnesium:   Lab Results   Component Value Date/Time    MG 2.0 02/15/2023 06:14 AM     Phosphorus:   Lab Results   Component Value Date/Time    PHOS 3.2 02/13/2023 09:20 AM     Ionized Calcium: No results found for: CAION   Troponin: No results for input(s): TROPONINI in the last 72 hours. Lactic Acid: No results for input(s): LACTA in the last 72 hours.   BNP:   Recent Labs     03/11/23 1920   PROBNP 8,871*     Lipids: No results found for: CHOL, HDL, TRIG  Hemoglobin A1C:   Lab Results   Component Value Date/Time    LABA1C 9.5 02/14/2023 12:44 AM     Urine Cultures: No results found for: LABURIN  Blood Cultures: No results found for: BC  No results found for: BLOODCULT2  Organism: No results found for: ORG  ABG: No results for input(s): PH, PCO2, PO2, HCO3, BE, O2SAT in the last 72 hours. Imaging Studies:    XR CHEST PORTABLE    Result Date: 3/11/2023  EXAMINATION: ONE XRAY VIEW OF THE CHEST 3/11/2023 7:31 pm COMPARISON: 02/13/2023 and prior HISTORY: ORDERING SYSTEM PROVIDED HISTORY: resp failure with hypoxemia TECHNOLOGIST PROVIDED HISTORY: Reason for exam:->resp failure with hypoxemia Reason for Exam: resp failure with hypoxemia Additional signs and symptoms: resp failure with hypoxemia FINDINGS: Unchanged mild-to-moderate cardiomegaly. Unchanged small right and probable small left pleural effusions with patchy opacities overlying the bilateral middle and lower lung zones. Vascular congestion with Kerley B lines. No pneumothorax. No acute osseous abnormality. Mild to moderate cardiomegaly with mild interstitial edema and small bilateral pleural effusions, findings which can be seen with congestive heart failure. Patchy opacities overlying the bilateral middle and lower lung zones could represent alveolar edema with underlying infection not excluded.          Electronically signed by Jyotsna Hodgson MD on 3/11/2023 at 10:23 PM

## 2023-03-12 NOTE — ED NOTES
ED TO INPATIENT SBAR HANDOFF    Patient Name: Jennifer Masters   :  1965  62 y.o. MRN:  [id number]  Preferred Name    ED Room #:  ED26/ED-26  Family/Caregiver Present yes   Restraints no   Sitter no   Sepsis Risk Score Sepsis Risk Score: 0.88    Situation  Code Status: Prior No additional code details. Allergies: Patient has no known allergies. Weight:   Patient Vitals for the past 96 hrs (Last 3 readings):   Weight   23 1907 (!) 320 lb (145.2 kg)     Arrived from: home  Chief Complaint:   Chief Complaint   Patient presents with    Shortness of Breath     Recently discharged from an ECF. States that upon arriving home pt started to have SOB w/ Wheezing. 2 Albuterol, 1 Atrovent given by EMS with positive effect. Imaging:   XR CHEST PORTABLE   Final Result   Mild to moderate cardiomegaly with mild interstitial edema and small   bilateral pleural effusions, findings which can be seen with congestive heart   failure. Patchy opacities overlying the bilateral middle and lower lung zones could   represent alveolar edema with underlying infection not excluded.            Abnormal labs:   Abnormal Labs Reviewed   CBC WITH AUTO DIFFERENTIAL - Abnormal; Notable for the following components:       Result Value    RBC 3.28 (*)     Hemoglobin 8.8 (*)     Hematocrit 30.9 (*)     MCH 26.8 (*)     MCHC 28.5 (*)     RDW 15.1 (*)     Segs Relative 72.3 (*)     Lymphocytes % 14.6 (*)     Monocytes % 7.6 (*)     Eosinophils % 4.2 (*)     Immature Neutrophil % 0.7 (*)     All other components within normal limits   COMPREHENSIVE METABOLIC PANEL - Abnormal; Notable for the following components:    CO2 34 (*)     Est, Glom Filt Rate 59 (*)     Glucose 209 (*)     Albumin 3.3 (*)     ALT <5 (*)     AST 12 (*)     All other components within normal limits   LACTATE, SEPSIS - Abnormal; Notable for the following components:    Lactic Acid, Sepsis 2.5 (*)     All other components within normal limits   TROPONIN - Abnormal; Notable for the following components:    Troponin T 0.060 (*)     All other components within normal limits   BRAIN NATRIURETIC PEPTIDE - Abnormal; Notable for the following components:    Pro-BNP 8,871 (*)     All other components within normal limits   BLOOD GAS, VENOUS - Abnormal; Notable for the following components:    pCO2, Mario Alberto 56 (*)     pO2, Mario Alberto 186 (*)     Base Exc, Mixed 8.9 (*)     HCO3, Venous 35.5 (*)     O2 Sat, Mario Alberto 93.4 (*)     All other components within normal limits     Critical values: see chart     Abnormal Assessment Findings: see chart    Background  History:   Past Medical History:   Diagnosis Date    Sepsis ([facility] [address]        Assessment    Vitals/MEWS: MEWS Score: 2  Level of Consciousness: Alert (0)   Vitals:    03/11/23 1932 03/11/23 2040 03/11/23 2202 03/11/23 2234   BP: (!) 147/87 (!) 153/90 (!) 102/90 (!) 102/90   Pulse: 82  (!) 101 (!) 101   Resp:   16 16   Temp:   98 °F (36.7 °C) 98 °F (36.7 °C)   TempSrc:   Oral Oral   SpO2: 97%  99%    Weight:         FiO2 (%):   O2 Flow Rate: O2 Device: Nasal cannula O2 Flow Rate (L/min): 3 L/min  Cardiac Rhythm:   Pain Assessment:  [x] Verbal [] Ezella Lam Scale  Pain Scale: Pain Assessment  Pain Assessment: None - Denies Pain  Last documented pain score (0-10 scale)    Last documented pain medication administered:   Mental Status: oriented  NIH Score: NIH     C-SSRS: Risk of Suicide: No Risk  Bedside swallow:    [place] Coma Scale (GCS): [place] Coma Scale  Eye Opening: Spontaneous  Best Verbal Response: Oriented  Best Motor Response: Obeys commands  Edmundo Coma Scale Score: 15  Active LDA's:   Peripheral IV 03/11/23 Left Antecubital (Active)   Dressing Status New dressing applied 03/11/23 1934     PO Status: Regular  Pertinent or High Risk Medications/Drips: no   o If Yes, please provide details:   Pending Blood Product Administration: no     You may also review the ED PT Care Timeline found under the Summary Nursing Index tab.    Recommendation    Pending orders   Plan for Discharge (if known):    Additional Comments:    If any further questions, please call Sending RN at [phone]    Electronically signed by: Electronically signed by Ashley Harden RN on 3/11/2023 at 10:35 PM       Hao Martinez RN  03/11/23 Chirag Flores RN  03/11/23 [id number]

## 2023-03-12 NOTE — ACP (ADVANCE CARE PLANNING)
Patient does not have any ACP documents/Medical Power of . LSW notes hospital will follow [place]'s Next of Kin hierarchy in the following descending order for priority:    Guardian  Spouse  [de-identified] of adult Children  Parents  [de-identified] of adult Siblings  Nearest Relative not described above    Per [place]'s Next of Kin hierarchy: Patients' child will be [address].
not examined

## 2023-03-12 NOTE — PROGRESS NOTES
V2.0  [facility] Hospitalist Progress Note      Name:  Denice Ashby /Age/Sex: 1965  (62 y.o. female)   MRN & CSN:  [id number] & [id number] Encounter Date/Time: 3/12/2023 11:26 AM EDT    Location:  [address] [place], [place] [id number] PCP: No primary care provider on file. Hospital Day: 2    Assessment and Plan:   Denice Ashby, a 62 y.o. female, with a history of IDDM, Class III Obesity, HFpEF, recent MSSA bacteremia with Endophthalmitis Rt. eye, RAMON, rhabdo, normocytic anemia due to anemia of chronic disease and B12 deficiency, COPD on 2 L/min home oxygen, hypertension, SAAD was admitted on 3/11/2023. They had concerns including Shortness of Breath (Recently discharged from an [facility]. States that upon arriving home pt started to have SOB w/ Wheezing. 2 Albuterol, 1 Atrovent given by EMS with positive effect. ). Assessment  Acute on chronic decompensated HFpEF  She has a history of CHF  Presented today with shortness of breath -she was just discharged home from [facility] today after recent hospitalization at [facility]  Progressively worsening swelling in her legs -3+ bilaterally for me  Of note, she states that after she was discharged home, she went home and ate a large meatball sub. And she had not taken her Lasix on the day of admission  proBNP elevated at 8871 -which is above her last reading of 1415 on 2023  Chest x-ray with interstitial edema with bilateral small pleural effusions with what appears to be alveolar edema as well  Was given Lasix 40 mg IV x1 in the ER  Plan to start Bumex 1 mg twice daily is enough oxygen as tolerated obtain proBNP every third day,  Echocardiogram pending  Strict I's and O's     2.  Acute on chronic hypoxic and hypercapnic respiratory failure 2/2 above, probable SAAD/OHS, doubt infection  Patient is on baseline 2 L/min oxygen at home  Apparently, patient was found hypoxemic by EMS and was given breathing treatment and increased her nasal cannula to 4 L/min  She has no white count and her Pro-Kamron is negative  Blood cultures were sent by the emergency department  PCO2 56 on initial ABG     3.   NSTEMI type II 2/2 likely demand ischemia 2/2 decompensated heart failure  Troponin elevated 0.060, no chest pain     4. Lactic acidemia 2/2 likely type B 2/2 decompensated heart failure  Lactic acid is 2.5, repeat pending     5. Normocytic anemia 2/2 anemia of chronic disease and B12 deficiency  Hemoglobin is 8.8. She was started on B12 supplements recently     6. History of COPD on 2 L/min home oxygen     History of hypertension, hyperlipidemia     8. Insulin-dependent diabetes mellitus -recently started on insulin  Most recent A1c-9.5% (2/2023) despite low hemoglobin  At [facility], she was started on Lantus 30 units along with 10 units 3 times daily of Humalog with meals     9. Recent MSSA bacteremia and right eye endophthalmitis  She was recently here in [facility] and was transferred to ICU due to MSSA bacteremia with right eye endophthalmitis  She was supposed to be on oxacillin IV, however, because the patient was refusing it-this was changed to oral dicloxacillin 500 mg 4 times daily with an end date of 3/18/2023 by infectious disease at [address] [place] [address] taking dicloxacillin with end of treatment date of 3/18/2023      Recent RAMON and rhabdomyolysis     11. Class III obesity  BMI 53.25    Diet ADULT DIET; Regular; No Added Salt (3-4 gm)   DVT Prophylaxis [] Lovenox, []  Heparin, [] SCDs, [] Ambulation,  [] Eliquis, [] Xarelto  [] Coumadin   Code Status Full Code   Disposition From: Home  Expected Disposition: Home  Estimated Date of Discharge: 3 to 4 days  Patient requires continued admission due to needs aggressive diuresis prior to safe discharge   Surrogate Decision Maker/ POA      Subjective:     Chief Complaint: Shortness of Breath (Recently discharged from an [facility]. States that upon arriving home pt started to have SOB w/ Wheezing.  2 Albuterol, 1 Atrovent given by EMS with positive effect.)     The patient was seen in the resuscitation bay sleeping comfortably patient also has likely underlying component of obesity hypoventilation syndrome with fatigue and snoring. All questions were answered at bedside. No acute events overnight. Patient wanted to sleep,. Review of Systems:    Review of Systems  Except above negative    Objective: Intake/Output Summary (Last 24 hours) at 3/12/2023 1126  Last data filed at 3/12/2023 0530  Gross per 24 hour   Intake --   Output 800 ml   Net -800 ml        Vitals:   Vitals:    03/12/23 0651   BP:    Pulse:    Resp:    Temp:    SpO2: 93%       Physical Exam:   Physical Exam  Vitals reviewed. Constitutional:       Appearance: Normal appearance. She is obese. She is ill-appearing. HENT:      Head: Normocephalic. Right Ear: Tympanic membrane normal.      Left Ear: Tympanic membrane normal.      Nose: Nose normal.      Mouth/Throat:      Mouth: Mucous membranes are moist.   Eyes:      Extraocular Movements: Extraocular movements intact. Conjunctiva/sclera: Conjunctivae normal.      Pupils: Pupils are equal, round, and reactive to light. Cardiovascular:      Rate and Rhythm: Normal rate and regular rhythm. Pulses: Normal pulses. Heart sounds: No murmur heard. No friction rub. Pulmonary:      Effort: Pulmonary effort is normal.      Breath sounds: Normal breath sounds. No wheezing, rhonchi or rales. Abdominal:      General: Abdomen is flat. Bowel sounds are normal. There is distension. Palpations: Abdomen is soft. Tenderness: There is no abdominal tenderness. Musculoskeletal:         General: No deformity. Normal range of motion. Cervical back: Normal range of motion and neck supple. Right lower leg: Edema present. Left lower leg: No edema. Skin:     General: Skin is warm. Coloration: Skin is not jaundiced or pale. Neurological:      General: No focal deficit present.       Mental Status: She is alert and oriented to person, place, and time. Mental status is at baseline. Motor: Weakness present.    Psychiatric:         Mood and Affect: Mood normal.         Behavior: Behavior normal.          Medications:   Medications:    carvedilol  6.25 mg Oral BID WC    bumetanide  1 mg IntraVENous BID    insulin lispro  0-4 Units SubCUTAneous TID     insulin lispro  0-4 Units SubCUTAneous Nightly    atorvastatin  20 mg Oral QPM    sodium chloride flush  5-40 mL IntraVENous 2 times per day    enoxaparin  30 mg SubCUTAneous BID    insulin glargine  30 Units SubCUTAneous QPM    insulin lispro  10 Units SubCUTAneous TID     cyanocobalamin  1,000 mcg Oral Daily    dicloxacillin  500 mg Oral 4x Daily      Infusions:    dextrose      sodium chloride       PRN Meds: glucose, 4 tablet, PRN  dextrose bolus, 125 mL, PRN   Or  dextrose bolus, 250 mL, PRN  glucagon (rDNA), 1 mg, PRN  dextrose, , Continuous PRN  sodium chloride flush, 5-40 mL, PRN  sodium chloride, , PRN  ondansetron, 4 mg, Q8H PRN   Or  ondansetron, 4 mg, Q6H PRN  polyethylene glycol, 17 g, Daily PRN  acetaminophen, 650 mg, Q6H PRN   Or  acetaminophen, 650 mg, Q6H PRN  potassium chloride, 40 mEq, PRN   Or  potassium alternative oral replacement, 40 mEq, PRN   Or  potassium chloride, 10 mEq, PRN  magnesium sulfate, 2,000 mg, PRN        Labs      Recent Results (from the past 24 hour(s))   CBC with Auto Differential    Collection Time: 03/11/23  7:20 PM   Result Value Ref Range    WBC 6.7 4.0 - 10.5 K/CU MM    RBC 3.28 (L) 4.2 - 5.4 M/CU MM    Hemoglobin 8.8 (L) 12.5 - 16.0 GM/DL    Hematocrit 30.9 (L) 37 - 47 %    MCV 94.2 78 - 100 FL    MCH 26.8 (L) 27 - 31 PG    MCHC 28.5 (L) 32.0 - 36.0 %    RDW 15.1 (H) 11.7 - 14.9 %    Platelets 742 056 - 294 K/CU MM    MPV 10.8 7.5 - 11.1 FL    Differential Type AUTOMATED DIFFERENTIAL     Segs Relative 72.3 (H) 36 - 66 %    Lymphocytes % 14.6 (L) 24 - 44 %    Monocytes % 7.6 (H) 0 - 4 %    Eosinophils % 4.2 (H) 0 - 3 % Basophils % 0.6 0 - 1 %    Segs Absolute 4.9 K/CU MM    Lymphocytes Absolute 1.0 K/CU MM    Monocytes Absolute 0.5 K/CU MM    Eosinophils Absolute 0.3 K/CU MM    Basophils Absolute 0.0 K/CU MM    Nucleated RBC % 0.0 %    Total Nucleated RBC 0.0 K/CU MM    Total Immature Neutrophil 0.05 K/CU MM    Immature Neutrophil % 0.7 (H) 0 - 0.43 %   Comprehensive Metabolic Panel    Collection Time: 03/11/23  7:20 PM   Result Value Ref Range    Sodium 142 135 - 145 MMOL/L    Potassium 4.1 3.5 - 5.1 MMOL/L    Chloride 99 99 - 110 mMol/L    CO2 34 (H) 21 - 32 MMOL/L    BUN 19 6 - 23 MG/DL    Creatinine 1.1 0.6 - 1.1 MG/DL    Est, Glom Filt Rate 59 (L) >60 mL/min/1.73m2    Glucose 209 (H) 70 - 99 MG/DL    Calcium 8.4 8.3 - 10.6 MG/DL    Albumin 3.3 (L) 3.4 - 5.0 GM/DL    Total Protein 7.0 6.4 - 8.2 GM/DL    Total Bilirubin 0.4 0.0 - 1.0 MG/DL    ALT <5 (L) 10 - 40 U/L    AST 12 (L) 15 - 37 IU/L    Alkaline Phosphatase 113 40 - 129 IU/L    Anion Gap 9 4 - 16   Lactate, Sepsis    Collection Time: 03/11/23  7:20 PM   Result Value Ref Range    Lactic Acid, Sepsis 2.5 (HH) 0.5 - 1.9 mMOL/L   Procalcitonin    Collection Time: 03/11/23  7:20 PM   Result Value Ref Range    Procalcitonin 0.098    Troponin    Collection Time: 03/11/23  7:20 PM   Result Value Ref Range    Troponin T 0.060 (H) <0.01 NG/ML   Brain Natriuretic Peptide    Collection Time: 03/11/23  7:20 PM   Result Value Ref Range    Pro-BNP 8,871 (H) <300 PG/ML   EKG 12 Lead    Collection Time: 03/11/23  7:22 PM   Result Value Ref Range    Ventricular Rate 92 BPM    Atrial Rate 92 BPM    P-R Interval 136 ms    QRS Duration 86 ms    Q-T Interval 360 ms    QTc Calculation (Bazett) 445 ms    P Axis 60 degrees    R Axis 50 degrees    T Axis -16 degrees    Diagnosis       Normal sinus rhythm  T wave abnormality, consider anterior ischemia  Abnormal ECG  No previous ECGs available     Blood Gas, Venous    Collection Time: 03/11/23  9:30 PM   Result Value Ref Range    pH, Mario Alberto 7.41 7.32 - 7.42    pCO2, Mario Alberto 56 (H) 38 - 52 mmHG    pO2, Mario Alberto 186 (H) 28 - 48 mmHG    Base Exc, Mixed 8.9 (H) 0 - 2.3    HCO3, Venous 35.5 (H) 19 - 25 MMOL/L    O2 Sat, Mario Alberto 93.4 (H) 50 - 70 %    Comment VBG    POCT Glucose    Collection Time: 03/12/23 12:00 AM   Result Value Ref Range    POC Glucose 250 (H) 70 - 99 MG/DL   EKG 12 lead    Collection Time: 03/12/23  1:54 AM   Result Value Ref Range    Ventricular Rate 75 BPM    Atrial Rate 75 BPM    P-R Interval 132 ms    QRS Duration 96 ms    Q-T Interval 418 ms    QTc Calculation (Bazett) 466 ms    P Axis 40 degrees    R Axis 35 degrees    T Axis -15 degrees    Diagnosis       Normal sinus rhythm  T wave abnormality, consider inferior ischemia  T wave abnormality, consider anterior ischemia  Prolonged QT  Abnormal ECG  When compared with ECG of 11-MAR-2023 19:22,  No significant change was found     Basic Metabolic Panel    Collection Time: 03/12/23  4:14 AM   Result Value Ref Range    Sodium 143 135 - 145 MMOL/L    Potassium 4.0 3.5 - 5.1 MMOL/L    Chloride 101 99 - 110 mMol/L    CO2 36 (H) 21 - 32 MMOL/L    Anion Gap 6 4 - 16    BUN 18 6 - 23 MG/DL    Creatinine 1.0 0.6 - 1.1 MG/DL    Est, Glom Filt Rate >60 >60 mL/min/1.73m2    Glucose 224 (H) 70 - 99 MG/DL    Calcium 8.3 8.3 - 10.6 MG/DL   Magnesium    Collection Time: 03/12/23  4:14 AM   Result Value Ref Range    Magnesium 1.7 (L) 1.8 - 2.4 mg/dl   Lipid Panel    Collection Time: 03/12/23  4:14 AM   Result Value Ref Range    Triglycerides 126 <150 MG/DL    Cholesterol 97 <200 MG/DL    HDL 35 (L) >40 MG/DL    LDL Calculated 37 <100 MG/DL   Phosphorus    Collection Time: 03/12/23  4:14 AM   Result Value Ref Range    Phosphorus 2.7 2.5 - 4.9 MG/DL   CBC with Auto Differential    Collection Time: 03/12/23  4:14 AM   Result Value Ref Range    WBC 6.2 4.0 - 10.5 K/CU MM    RBC 2.84 (L) 4.2 - 5.4 M/CU MM    Hemoglobin 7.5 (L) 12.5 - 16.0 GM/DL    Hematocrit 27.3 (L) 37 - 47 %    MCV 96.1 78 - 100 FL    MCH 26.4 (L) 27 - 31 PG    MCHC 27.5 (L) 32.0 - 36.0 %    RDW 15.2 (H) 11.7 - 14.9 %    Platelets 763 355 - 060 K/CU MM    MPV 10.9 7.5 - 11.1 FL    Differential Type AUTOMATED DIFFERENTIAL     Segs Relative 66.5 (H) 36 - 66 %    Lymphocytes % 16.6 (L) 24 - 44 %    Monocytes % 10.9 (H) 0 - 4 %    Eosinophils % 4.8 (H) 0 - 3 %    Basophils % 0.6 0 - 1 %    Segs Absolute 4.1 K/CU MM    Lymphocytes Absolute 1.0 K/CU MM    Monocytes Absolute 0.7 K/CU MM    Eosinophils Absolute 0.3 K/CU MM    Basophils Absolute 0.0 K/CU MM    Nucleated RBC % 0.0 %    Total Nucleated RBC 0.0 K/CU MM    Total Immature Neutrophil 0.04 K/CU MM    Immature Neutrophil % 0.6 (H) 0 - 0.43 %   POCT Glucose    Collection Time: 03/12/23  8:48 AM   Result Value Ref Range    POC Glucose 189 (H) 70 - 99 MG/DL        Imaging/Diagnostics Last 24 Hours   XR CHEST PORTABLE    Result Date: 3/11/2023  EXAMINATION: ONE XRAY VIEW OF THE CHEST 3/11/2023 7:31 pm COMPARISON: 02/13/2023 and prior HISTORY: ORDERING SYSTEM PROVIDED HISTORY: resp failure with hypoxemia TECHNOLOGIST PROVIDED HISTORY: Reason for exam:->resp failure with hypoxemia Reason for Exam: resp failure with hypoxemia Additional signs and symptoms: resp failure with hypoxemia FINDINGS: Unchanged mild-to-moderate cardiomegaly. Unchanged small right and probable small left pleural effusions with patchy opacities overlying the bilateral middle and lower lung zones. Vascular congestion with Kerley B lines. No pneumothorax. No acute osseous abnormality. Mild to moderate cardiomegaly with mild interstitial edema and small bilateral pleural effusions, findings which can be seen with congestive heart failure. Patchy opacities overlying the bilateral middle and lower lung zones could represent alveolar edema with underlying infection not excluded.        Electronically signed by Corinne Morris MD on 3/12/2023 at 11:26 AM

## 2023-03-12 NOTE — ED PROVIDER NOTES
Triage Chief Complaint:   Shortness of Breath (Recently discharged from an ECF. States that upon arriving home pt started to have SOB w/ Wheezing. 2 Albuterol, 1 Atrovent given by EMS with positive effect.)    THALIA:  Martha Pool is a 62 y.o. female that presents with shortness of breath. Patient reports she was just discharged from an extended care facility after recovering from recent hospitalization at [place]. Patient reports that arriving home she became short of breath and felt like she was wheezing. Additionally, patient reports she has had progressive swelling to her legs and feels like she is building up fluid. Patient does report she is on 2 L of oxygen all the time but EMS found her hypoxemic and started breathing treatments. Patient is now requiring 4 L nasal cannula which is new for her. No fevers. No coughing. Normal urine output. Patient of note was hospitalized for MSSA bacteremia and was on long-term antibiotics via PICC line. Patient recently took herself off of those antibiotics as she felt she was not tolerating them. Patient reports that she also feels like her infection had completely cleared is another reason why she wanted to stop them. No further fevers. Patient of note was briefly off of her Lasix for an acute kidney injury but has restarted it.     ROS:  General:  No fevers, no chills, no weakness  Eyes:  No recent vison changes, no discharge  ENT:  No sore throat, no nasal congestion, no hearing changes  Cardiovascular:  No chest pain, no palpitations  Respiratory:  + shortness of breath, no cough, no wheezing  Gastrointestinal:  No pain, no nausea, no vomiting, no diarrhea  Musculoskeletal:  No muscle pain, no joint pain  Skin:  No rash, no pruritis, no easy bruising  Neurologic:  No speech problems, no headache, no extremity numbness, no extremity tingling, no extremity weakness  Psychiatric:  No anxiety  Genitourinary:  No dysuria, no hematuria  Endocrine:  No unexpected weight gain, no unexpected weight loss  Extremities:  + edema, no pain    Past Medical History:   Diagnosis Date    Sepsis ([address]      History reviewed. No pertinent surgical history. History reviewed. No pertinent family history. Social History     Socioeconomic History    Marital status:      Spouse name: Not on file    Number of children: Not on file    Years of education: Not on file    Highest education level: Not on file   Occupational History    Not on file   Tobacco Use    Smoking status: Never    Smokeless tobacco: Never   Substance and Sexual Activity    Alcohol use: Not Currently    Drug use: Not Currently    Sexual activity: Not on file   Other Topics Concern    Not on file   Social History Narrative    Not on file     Social Determinants of Health     Financial Resource Strain: Not on file   Food Insecurity: Not on file   Transportation Needs: Not on file   Physical Activity: Not on file   Stress: Not on file   Social Connections: Not on file   Intimate Partner Violence: Not on file   Housing Stability: Not on file     No current facility-administered medications for this encounter.      Current Outpatient Medications   Medication Sig Dispense Refill    carvedilol (COREG) 6.25 MG tablet Take 6.25 mg by mouth 2 times daily (with meals)      furosemide (LASIX) 40 MG tablet Take 40 mg by mouth daily      glipiZIDE (GLUCOTROL XL) 10 MG extended release tablet Take 10 mg by mouth daily      metFORMIN (GLUCOPHAGE) 1000 MG tablet Take 1,000 mg by mouth 2 times daily (with meals)       No Known Allergies    Nursing Notes Reviewed    Physical Exam:  ED Triage Vitals   Enc Vitals Group      BP 03/11/23 1905 120/73      Heart Rate 03/11/23 1905 99      Resp 03/11/23 1905 20      Temp 03/11/23 1905 97.8 °F (36.6 °C)      Temp Source 03/11/23 1905 Oral      SpO2 03/11/23 1905 (!) 83 %      Weight 03/11/23 1907 (!) 320 lb (145.2 kg)      Height --       Head Circumference --       Peak Flow -- Pain Score --       Pain Loc --       Pain Edu? --       Excl. in [address]? --        My pulse ox interpretation is -83% on 2 L nasal cannula    General appearance:  No acute distress. Appears very deconditioned. Skin:  Warm. Dry. No diaphoresis. Eye:  Extraocular movements intact. Ears, nose, mouth and throat:  Oral mucosa moist   Neck:  Trachea midline. Extremity:  No swelling. Normal ROM     Heart:  Regular rate and rhythm, normal S1 & S2, no extra heart sounds. Perfusion:  Intact   Respiratory: Globally diminished breath sounds. Mild tachypnea. Exam is limited by patient's habitus. Abdominal:  Normal bowel sounds. Soft. Nontender. Non distended. Markedly elevated BMI. Back:  No CVA tenderness to palpation     Neurological:  Alert and oriented times 3. No focal neuro deficits.              Psychiatric:  Appropriate    I have reviewed and interpreted all of the currently available lab results from this visit (if applicable):  Results for orders placed or performed during the hospital encounter of 03/11/23   CBC with Auto Differential   Result Value Ref Range    WBC 6.7 4.0 - 10.5 K/CU MM    RBC 3.28 (L) 4.2 - 5.4 M/CU MM    Hemoglobin 8.8 (L) 12.5 - 16.0 GM/DL    Hematocrit 30.9 (L) 37 - 47 %    MCV 94.2 78 - 100 FL    MCH 26.8 (L) 27 - 31 PG    MCHC 28.5 (L) 32.0 - 36.0 %    RDW 15.1 (H) 11.7 - 14.9 %    Platelets 598 111 - 051 K/CU MM    MPV 10.8 7.5 - 11.1 FL    Differential Type AUTOMATED DIFFERENTIAL     Segs Relative 72.3 (H) 36 - 66 %    Lymphocytes % 14.6 (L) 24 - 44 %    Monocytes % 7.6 (H) 0 - 4 %    Eosinophils % 4.2 (H) 0 - 3 %    Basophils % 0.6 0 - 1 %    Segs Absolute 4.9 K/CU MM    Lymphocytes Absolute 1.0 K/CU MM    Monocytes Absolute 0.5 K/CU MM    Eosinophils Absolute 0.3 K/CU MM    Basophils Absolute 0.0 K/CU MM    Nucleated RBC % 0.0 %    Total Nucleated RBC 0.0 K/CU MM    Total Immature Neutrophil 0.05 K/CU MM    Immature Neutrophil % 0.7 (H) 0 - 0.43 %   Comprehensive Metabolic Panel   Result Value Ref Range    Sodium 142 135 - 145 MMOL/L    Potassium 4.1 3.5 - 5.1 MMOL/L    Chloride 99 99 - 110 mMol/L    CO2 34 (H) 21 - 32 MMOL/L    BUN 19 6 - 23 MG/DL    Creatinine 1.1 0.6 - 1.1 MG/DL    Est, Glom Filt Rate 59 (L) >60 mL/min/1.73m2    Glucose 209 (H) 70 - 99 MG/DL    Calcium 8.4 8.3 - 10.6 MG/DL    Albumin 3.3 (L) 3.4 - 5.0 GM/DL    Total Protein 7.0 6.4 - 8.2 GM/DL    Total Bilirubin 0.4 0.0 - 1.0 MG/DL    ALT <5 (L) 10 - 40 U/L    AST 12 (L) 15 - 37 IU/L    Alkaline Phosphatase 113 40 - 129 IU/L    Anion Gap 9 4 - 16   Lactate, Sepsis   Result Value Ref Range    Lactic Acid, Sepsis 2.5 (HH) 0.5 - 1.9 mMOL/L   Procalcitonin   Result Value Ref Range    Procalcitonin 0.098    Troponin   Result Value Ref Range    Troponin T 0.060 (H) <0.01 NG/ML   Brain Natriuretic Peptide   Result Value Ref Range    Pro-BNP 8,871 (H) <300 PG/ML   Blood Gas, Venous   Result Value Ref Range    pH, Mario Alberto 7.41 7.32 - 7.42    pCO2, Mario Alberto 56 (H) 38 - 52 mmHG    pO2, Mario Alberto 186 (H) 28 - 48 mmHG    Base Exc, Mixed 8.9 (H) 0 - 2.3    HCO3, Venous 35.5 (H) 19 - 25 MMOL/L    O2 Sat, Mario Alberto 93.4 (H) 50 - 70 %    Comment VBG       Radiographs (if obtained):  [] The following radiograph was interpreted by myself in the absence of a radiologist:   [x] Radiologist's Report Reviewed:  XR CHEST PORTABLE   Final Result   Mild to moderate cardiomegaly with mild interstitial edema and small   bilateral pleural effusions, findings which can be seen with congestive heart   failure. Patchy opacities overlying the bilateral middle and lower lung zones could   represent alveolar edema with underlying infection not excluded.                EKG (if obtained): (All EKG's are interpreted by myself in the absence of a cardiologist)  12 lead EKG per my interpretation:  Normal Sinus Rhythm at 92  Axis is   Normal  QTc is  within an acceptable range  There is no specific T wave changes appreciated; nonspecific T wave inversions in the inferior leads as well as biphasic T waves in the anterior lateral leads. There is no specific ST wave changes appreciated. No STEMI    Prior EKG to compare with was not available. Chart review shows recent radiographs:  CT HEAD WO CONTRAST    Result Date: 2/12/2023  EXAMINATION: CT OF THE HEAD WITHOUT CONTRAST  2/12/2023 4:25 pm TECHNIQUE: CT of the head was performed without the administration of intravenous contrast. Automated exposure control, iterative reconstruction, and/or weight based adjustment of the mA/kV was utilized to reduce the radiation dose to as low as reasonably achievable. COMPARISON: None. HISTORY: ORDERING SYSTEM PROVIDED HISTORY: Fall, hit head TECHNOLOGIST PROVIDED HISTORY: Has a \"code stroke\" or \"stroke alert\" been called? ->No Reason for exam:->Fall, hit head Decision Support Exception - unselect if not a suspected or confirmed emergency medical condition->Emergency Medical Condition (MA) Reason for Exam: Fall, hit head FINDINGS: BRAIN/VENTRICLES: There is no acute intracranial hemorrhage, mass effect or midline shift. No abnormal extra-axial fluid collection. The gray-white differentiation is maintained without evidence of an acute infarct. There is no evidence of hydrocephalus. ORBITS: The visualized portion of the orbits demonstrate no acute abnormality. SINUSES: The visualized paranasal sinuses and mastoid air cells demonstrate no acute abnormality. SOFT TISSUES/SKULL:  No acute abnormality of the visualized skull or soft tissues. No acute intracranial abnormality. CT CERVICAL SPINE WO CONTRAST    Result Date: 2/12/2023  EXAMINATION: CT OF THE CERVICAL SPINE WITHOUT CONTRAST 2/12/2023 4:25 pm TECHNIQUE: CT of the cervical spine was performed without the administration of intravenous contrast. Multiplanar reformatted images are provided for review.  Automated exposure control, iterative reconstruction, and/or weight based adjustment of the mA/kV was utilized to reduce the radiation dose to as low as reasonably achievable. COMPARISON: None. HISTORY: ORDERING SYSTEM PROVIDED HISTORY: Fall from bed TECHNOLOGIST PROVIDED HISTORY: Reason for exam:->Fall from bed Decision Support Exception - unselect if not a suspected or confirmed emergency medical condition->Emergency Medical Condition (MA) Reason for Exam: FALL, NECK PAIN FINDINGS: BONES/ALIGNMENT: There is no acute fracture or traumatic malalignment. DEGENERATIVE CHANGES: Calcification of the posterior longitudinal ligament at C3-C4, which causes eccentric narrowing of the spinal canal. SOFT TISSUES: There is no prevertebral soft tissue swelling. No acute abnormality of the cervical spine. XR CHEST PORTABLE    Result Date: 3/11/2023  EXAMINATION: ONE XRAY VIEW OF THE CHEST 3/11/2023 7:31 pm COMPARISON: 02/13/2023 and prior HISTORY: ORDERING SYSTEM PROVIDED HISTORY: resp failure with hypoxemia TECHNOLOGIST PROVIDED HISTORY: Reason for exam:->resp failure with hypoxemia Reason for Exam: resp failure with hypoxemia Additional signs and symptoms: resp failure with hypoxemia FINDINGS: Unchanged mild-to-moderate cardiomegaly. Unchanged small right and probable small left pleural effusions with patchy opacities overlying the bilateral middle and lower lung zones. Vascular congestion with Kerley B lines. No pneumothorax. No acute osseous abnormality. Mild to moderate cardiomegaly with mild interstitial edema and small bilateral pleural effusions, findings which can be seen with congestive heart failure. Patchy opacities overlying the bilateral middle and lower lung zones could represent alveolar edema with underlying infection not excluded.      XR CHEST PORTABLE    Result Date: 2/17/2023  EXAMINATION: ONE XRAY VIEW OF THE CHEST 2/13/2023 12:11 pm COMPARISON: 02/12/2023 HISTORY: ORDERING SYSTEM PROVIDED HISTORY: sob TECHNOLOGIST PROVIDED HISTORY: Reason for exam:->sob Reason for Exam: sob Follow-up FINDINGS: The trachea is midline. The patient is rotated toward the right. There is mild fullness in the right hilar region which may be related to the vascular structures rather than an abnormality. Refer to the recent CT. There is interval development of atelectasis or infiltrate in the right lung base. Small right effusion. Linear changes in the right lung base which could represent atelectasis or infiltrate. Small right effusion. Follow up to resolution is suggested. XR CHEST PORTABLE    Result Date: 2/12/2023  EXAMINATION: ONE XRAY VIEW OF THE CHEST 2/12/2023 5:54 pm COMPARISON: Chest x-ray September 19, 2008 HISTORY: ORDERING SYSTEM PROVIDED HISTORY: Fall TECHNOLOGIST PROVIDED HISTORY: Reason for exam:->Fall Reason for Exam: fall FINDINGS: Cardiac silhouette is enlarged but grossly stable given changes in technique when compared with prior exam.  Central pulmonary vascular congestion and mild interstitial opacities favored to reflect changes related to pulmonary edema. No large effusion. No pneumothorax. 1. Cardiomegaly and findings favored to reflect pulmonary edema. CT CHEST ABDOMEN PELVIS W CONTRAST Additional Contrast? None    Result Date: 2/13/2023  EXAMINATION: CT OF THE CHEST, ABDOMEN, AND PELVIS WITH CONTRAST 2/13/2023 2:36 am TECHNIQUE: CT of the chest, abdomen and pelvis was performed with the administration of intravenous contrast. Multiplanar reformatted images are provided for review. Automated exposure control, iterative reconstruction, and/or weight based adjustment of the mA/kV was utilized to reduce the radiation dose to as low as reasonably achievable.  COMPARISON: None HISTORY: ORDERING SYSTEM PROVIDED HISTORY: Shortness of breath, sepsis TECHNOLOGIST PROVIDED HISTORY: Reason for exam:->Shortness of breath, sepsis Additional Contrast?->None Decision Support Exception - unselect if not a suspected or confirmed emergency medical condition->Emergency Medical Condition (MA) Reason for Exam: Shortness of breath, sepsis, emesis FINDINGS: Chest: Mediastinum: Cardiac chambers are not enlarged and no significant pericardial effusion. Mediastinal lymph nodes are not enlarged. Thoracic aorta is not aneurysmal.  Bolus timing does not allow for evaluation of the pulmonary arterial system. Lungs/pleura: Linear bands of atelectasis/scarring in the right lower lobe and some calcified granulomas. No focal consolidation or diffuse opacities are seen to indicate pneumonia. Noncalcified 3 mm nodule in the left upper lobe on series 304 image number 66 there is no pleural effusion or pneumothorax. Soft Tissues/Bones: No acute or displaced fractures are seen at the ribs. There is no soft tissue emphysema in the chest wall. Degenerative changes anterior longitudinal ligament ossification in the mid to lower thoracic spine. No acute fracture or subluxation is demonstrated at the spine. Abdomen/Pelvis: Organs: Liver is enlarged and a small portion is cut off along the right lateral aspect. Otherwise the liver surface is smooth. The biliary system is not dilated. Multiple calcified gallstones are present in the gallbladder without surrounding fat stranding at the gallbladder fossa. The spleen is not enlarged. There is fatty atrophy of the pancreas with limited amounts of parenchyma remaining. Adrenal glands are unremarkable. Mild perinephric stranding is symmetric and may relate to chronic renal disease. There is no acute enhancement abnormality, hydronephrosis, or hydroureter. GI/Bowel: Small portion of the ascending colon is cut off along the right lateral aspect of the abdomen. There is constipation in the distal colon and rectum. Scattered diverticulosis but without signs of acute diverticulitis. Small bowel and colon are not dilated. Some gastritis is suspected at the stomach. There is no ascites or pneumoperitoneum. Pelvis: The urinary bladder wall is not significantly thickened.   The uterus is not enlarged. No free fluid in the pelvis. Peritoneum/Retroperitoneum: No abdominal aortic aneurysm, retroperitoneal hematoma, or bulky adenopathy. Bones/Soft Tissues: The right lateral abdominal wall is cut off the exam. Degenerative changes along the spine without acute fracture deformity or subluxation. Also has degenerative changes in the sacroiliac joints and hips with joint space narrowing and marginal sclerosis. 1.  Linear bands of atelectasis/scarring in the right lower chest and some scattered calcified granulomas without signs of pneumonia. 2.  A noncalcified 3 mm nodule in the left upper lobe would not require further workup in a low risk patient. 3.  The liver is enlarged and portion of the right lateral abdomen including the edge of the liver is cut off the edge of the exam.  No apparent mass or nodularity within the liver. 4.  There is cholelithiasis but without fat stranding around the gallbladder fossa. 5.  No bowel obstruction, pneumoperitoneum, or sign of diverticulitis. Some gastritis in the stomach is suspected. 6.  Some stranding around both kidneys appear symmetric and may relate to chronic renal disease. There is no hydronephrosis or hydroureter. 7.  Fatty atrophy of the pancreas. MDM:  CC/HPI Summary, DDx, ED Course, and Reassessment:  Pt presents as above. Emergent conditions considered. Presentation prompted initial labs, EKG and imaging. EKG with a normal sinus rhythm as above. Chest x-ray demonstrating mild to moderate cardiomegaly with mild interstitial edema and small bilateral pleural effusions as well as patchy opacities overlying the bilateral middle and lower lung zones which could represent alveolar edema with underlying infection not excluded. Patient with elevated BNP of 8000 and patient is clinically volume overloaded on exam.  No leukocytosis; doubt pneumonia. CBC does demonstrate acute on chronic anemia.   Troponin is abnormal at 0.06 but will need to be trended. CMP is with mild hyperglycemia without elevated anion gap metabolic acidosis. VBG without acidemia. Procalcitonin is not suggestive of serious bacterial infection or sepsis. IV is established and IV Lasix is given for diuresis. Patient with 4 L oxygen requirement with baseline of just 2 L oxygen need. History from : Patient    Limitations to history : None    Patient was given the following medications:  Medications   furosemide (LASIX) injection 40 mg (40 mg IntraVENous Given 3/11/23 2040)       Independent Imaging Interpretation by me: NA    Chronic conditions affecting care: chronic resp failure    Discussion with Other Profesionals : Admitting Team (hospitalist)    Social Determinants : None    Records Reviewed : Inpatient Notes including notes from most recent hospitalization at [facility]    Disposition Considerations (tests considered but not done, Shared Decision Making, Pt Expectation of Test or Tx.):   Patient admitted  I discussed specific signs and symptoms on when to return to the emergency department as well as the need for close outpatient follow-up. Questions sought and answered with the patient. They voice understanding and agree with plan. I am the Primary Clinician of Record. Is this patient to be included in the SEP-1 Core Measure due to severe sepsis or septic shock? No   Exclusion criteria - the patient is NOT to be included for SEP-1 Core Measure due to: Infection is not suspected      CRITICAL CARE NOTE:  There was a high probability of clinically significant life-threatening deterioration of the patient's condition requiring my urgent intervention due to acute on chronic respiratory failure with hypoxemia secondary to volume overload.   IV diuresis, supplemental oxygen administration via nasal cannula, chart review including most recent hospitalization [facility], home medication review, telemetry monitoring and frequent rechecks ready course was performed to address this. Total critical care time is 35 minutes. This includes vital sign monitoring, pulse oximetry monitoring, telemetry monitoring, clinical response to the IV medications, reviewing the nursing notes, consultation time, dictation/documentation time, and interpretation of the lab work. This time excludes time spent performing procedures and separately billable procedures and family discussion time. Care of this patient occurred during the COVID-19 pandemic. Clinical Impression:  1. Acute pulmonary edema (HCC)    2. Acute on chronic respiratory failure with hypoxemia (HCC)      Disposition referral (if applicable):  No follow-up provider specified. Disposition medications (if applicable):  New Prescriptions    No medications on file       Comment: Please note this report has been produced using speech recognition software and may contain errors related to that system including errors in grammar, punctuation, and spelling, as well as words and phrases that may be inappropriate. If there are any questions or concerns please feel free to contact the dictating provider for clarification.         Wing Neff MD  03/11/23 [id number]

## 2023-03-12 NOTE — CARE COORDINATION
Pt noted for possible readmission. Pt was recently admitted 2/12-2/16/23 for severe sepsis without septic shock. Pt was transferred to [place] for further treatment of eye endophthalmitis. PT/OT made recs for ARU. Pt declined and wanted to return home with [facility]. Pt was admitted at [place] 2/16-2/24/23. Pt was transferred to [address] [place] [address] for iv antibiotics. Pt was d/c home recently from [address] [place] [address]. Pt returns today for SOB. Pt is being admitted due to increased O2 needs.

## 2023-03-12 NOTE — PROGRESS NOTES
4 Eyes Skin Assessment     NAME:  Gomez Ferrara  YOB: 1965  MEDICAL RECORD NUMBER:  [id number]    The patient is being assessed for  Admission    I agree that One RN has performed a thorough Head to Toe Skin Assessment on the patient. ALL assessment sites listed below have been assessed. Areas assessed by both nurses:    Head, Face, Ears, Shoulders, Back, Chest, Arms, Elbows, Hands, Sacrum. Buttock, Coccyx, Ischium, and Legs. Feet and Heels        Does the Patient have a Wound?  No noted wound(s)       Gerson Prevention initiated by RN: Yes   Wound Care Orders initiated by RN: No    Pressure Injury (Stage 3,4, Unstageable, DTI, NWPT, and Complex wounds) if present, place referral order by RN under : No    New and Established Ostomies, if present place, referral order under : No      Nurse 1 eSignature: Electronically signed by King Garber RN on 3/12/23 at 1:43 AM EST    **SHARE this note so that the co-signing nurse can place an eSignature**    Nurse 2 eSignature: Electronically signed by Vern Mayorga RN on 3/12/23 at 1:45 AM EST

## 2023-03-13 ENCOUNTER — APPOINTMENT (OUTPATIENT)
Dept: ULTRASOUND IMAGING | Age: 58
DRG: 280 | End: 2023-03-13
Payer: MEDICARE

## 2023-03-13 PROBLEM — J81.0 ACUTE PULMONARY EDEMA (HCC): Status: ACTIVE | Noted: 2023-03-13

## 2023-03-13 LAB
ANION GAP SERPL CALCULATED.3IONS-SCNC: 7 MMOL/L (ref 4–16)
BASOPHILS ABSOLUTE: 0 K/CU MM
BASOPHILS RELATIVE PERCENT: 0.5 % (ref 0–1)
BUN SERPL-MCNC: 18 MG/DL (ref 6–23)
CALCIUM SERPL-MCNC: 7.8 MG/DL (ref 8.3–10.6)
CHLORIDE BLD-SCNC: 100 MMOL/L (ref 99–110)
CO2: 36 MMOL/L (ref 21–32)
CREAT SERPL-MCNC: 1 MG/DL (ref 0.6–1.1)
DIFFERENTIAL TYPE: ABNORMAL
EKG ATRIAL RATE: 75 BPM
EKG ATRIAL RATE: 92 BPM
EKG DIAGNOSIS: NORMAL
EKG DIAGNOSIS: NORMAL
EKG P AXIS: 40 DEGREES
EKG P AXIS: 60 DEGREES
EKG P-R INTERVAL: 132 MS
EKG P-R INTERVAL: 136 MS
EKG Q-T INTERVAL: 360 MS
EKG Q-T INTERVAL: 418 MS
EKG QRS DURATION: 86 MS
EKG QRS DURATION: 96 MS
EKG QTC CALCULATION (BAZETT): 445 MS
EKG QTC CALCULATION (BAZETT): 466 MS
EKG R AXIS: 35 DEGREES
EKG R AXIS: 50 DEGREES
EKG T AXIS: -15 DEGREES
EKG T AXIS: -16 DEGREES
EKG VENTRICULAR RATE: 75 BPM
EKG VENTRICULAR RATE: 92 BPM
EOSINOPHILS ABSOLUTE: 0.3 K/CU MM
EOSINOPHILS RELATIVE PERCENT: 6.2 % (ref 0–3)
GFR SERPL CREATININE-BSD FRML MDRD: >60 ML/MIN/1.73M2
GLUCOSE BLD-MCNC: 142 MG/DL (ref 70–99)
GLUCOSE BLD-MCNC: 146 MG/DL (ref 70–99)
GLUCOSE BLD-MCNC: 170 MG/DL (ref 70–99)
GLUCOSE BLD-MCNC: 185 MG/DL (ref 70–99)
GLUCOSE SERPL-MCNC: 139 MG/DL (ref 70–99)
HCT VFR BLD CALC: 26.7 % (ref 37–47)
HEMOGLOBIN: 7.3 GM/DL (ref 12.5–16)
IMMATURE NEUTROPHIL %: 0.5 % (ref 0–0.43)
LV EF: 60 %
LVEF MODALITY: NORMAL
LYMPHOCYTES ABSOLUTE: 1.2 K/CU MM
LYMPHOCYTES RELATIVE PERCENT: 22.5 % (ref 24–44)
MAGNESIUM: 1.6 MG/DL (ref 1.8–2.4)
MCH RBC QN AUTO: 26.5 PG (ref 27–31)
MCHC RBC AUTO-ENTMCNC: 27.3 % (ref 32–36)
MCV RBC AUTO: 97.1 FL (ref 78–100)
MONOCYTES ABSOLUTE: 0.5 K/CU MM
MONOCYTES RELATIVE PERCENT: 8.9 % (ref 0–4)
NUCLEATED RBC %: 0 %
PDW BLD-RTO: 15.3 % (ref 11.7–14.9)
PHOSPHORUS: 3.4 MG/DL (ref 2.5–4.9)
PLATELET # BLD: 177 K/CU MM (ref 140–440)
PMV BLD AUTO: 10.6 FL (ref 7.5–11.1)
POTASSIUM SERPL-SCNC: 3.9 MMOL/L (ref 3.5–5.1)
RBC # BLD: 2.75 M/CU MM (ref 4.2–5.4)
SEGMENTED NEUTROPHILS ABSOLUTE COUNT: 3.4 K/CU MM
SEGMENTED NEUTROPHILS RELATIVE PERCENT: 61.4 % (ref 36–66)
SODIUM BLD-SCNC: 143 MMOL/L (ref 135–145)
TOTAL IMMATURE NEUTOROPHIL: 0.03 K/CU MM
TOTAL NUCLEATED RBC: 0 K/CU MM
WBC # BLD: 5.5 K/CU MM (ref 4–10.5)

## 2023-03-13 PROCEDURE — 6370000000 HC RX 637 (ALT 250 FOR IP): Performed by: INTERNAL MEDICINE

## 2023-03-13 PROCEDURE — 2580000003 HC RX 258: Performed by: INTERNAL MEDICINE

## 2023-03-13 PROCEDURE — 86922 COMPATIBILITY TEST ANTIGLOB: CPT

## 2023-03-13 PROCEDURE — 6360000002 HC RX W HCPCS: Performed by: INTERNAL MEDICINE

## 2023-03-13 PROCEDURE — 36415 COLL VENOUS BLD VENIPUNCTURE: CPT

## 2023-03-13 PROCEDURE — 80048 BASIC METABOLIC PNL TOTAL CA: CPT

## 2023-03-13 PROCEDURE — 2709999900 HC NON-CHARGEABLE SUPPLY

## 2023-03-13 PROCEDURE — 2700000000 HC OXYGEN THERAPY PER DAY

## 2023-03-13 PROCEDURE — 86900 BLOOD TYPING SEROLOGIC ABO: CPT

## 2023-03-13 PROCEDURE — APPSS45 APP SPLIT SHARED TIME 31-45 MINUTES

## 2023-03-13 PROCEDURE — 97166 OT EVAL MOD COMPLEX 45 MIN: CPT

## 2023-03-13 PROCEDURE — 99222 1ST HOSP IP/OBS MODERATE 55: CPT | Performed by: INTERNAL MEDICINE

## 2023-03-13 PROCEDURE — 93306 TTE W/DOPPLER COMPLETE: CPT

## 2023-03-13 PROCEDURE — 93971 EXTREMITY STUDY: CPT

## 2023-03-13 PROCEDURE — 76937 US GUIDE VASCULAR ACCESS: CPT

## 2023-03-13 PROCEDURE — 36410 VNPNXR 3YR/> PHY/QHP DX/THER: CPT

## 2023-03-13 PROCEDURE — 36430 TRANSFUSION BLD/BLD COMPNT: CPT

## 2023-03-13 PROCEDURE — 97530 THERAPEUTIC ACTIVITIES: CPT

## 2023-03-13 PROCEDURE — 2500000003 HC RX 250 WO HCPCS: Performed by: INTERNAL MEDICINE

## 2023-03-13 PROCEDURE — 1200000000 HC SEMI PRIVATE

## 2023-03-13 PROCEDURE — 97162 PT EVAL MOD COMPLEX 30 MIN: CPT

## 2023-03-13 PROCEDURE — 86850 RBC ANTIBODY SCREEN: CPT

## 2023-03-13 PROCEDURE — 84100 ASSAY OF PHOSPHORUS: CPT

## 2023-03-13 PROCEDURE — 94761 N-INVAS EAR/PLS OXIMETRY MLT: CPT

## 2023-03-13 PROCEDURE — 97535 SELF CARE MNGMENT TRAINING: CPT

## 2023-03-13 PROCEDURE — P9016 RBC LEUKOCYTES REDUCED: HCPCS

## 2023-03-13 PROCEDURE — 85025 COMPLETE CBC W/AUTO DIFF WBC: CPT

## 2023-03-13 PROCEDURE — 86901 BLOOD TYPING SEROLOGIC RH(D): CPT

## 2023-03-13 PROCEDURE — 83735 ASSAY OF MAGNESIUM: CPT

## 2023-03-13 PROCEDURE — 82962 GLUCOSE BLOOD TEST: CPT

## 2023-03-13 PROCEDURE — 05HF33Z INSERTION OF INFUSION DEVICE INTO LEFT CEPHALIC VEIN, PERCUTANEOUS APPROACH: ICD-10-PCS | Performed by: STUDENT IN AN ORGANIZED HEALTH CARE EDUCATION/TRAINING PROGRAM

## 2023-03-13 RX ORDER — SODIUM CHLORIDE 9 MG/ML
INJECTION, SOLUTION INTRAVENOUS PRN
Status: DISCONTINUED | OUTPATIENT
Start: 2023-03-13 | End: 2023-03-16 | Stop reason: HOSPADM

## 2023-03-13 RX ADMIN — MAGNESIUM SULFATE HEPTAHYDRATE 2000 MG: 2 INJECTION, SOLUTION INTRAVENOUS at 08:50

## 2023-03-13 RX ADMIN — ENOXAPARIN SODIUM 30 MG: 100 INJECTION SUBCUTANEOUS at 08:42

## 2023-03-13 RX ADMIN — INSULIN LISPRO 10 UNITS: 100 INJECTION, SOLUTION INTRAVENOUS; SUBCUTANEOUS at 12:53

## 2023-03-13 RX ADMIN — INSULIN GLARGINE 30 UNITS: 100 INJECTION, SOLUTION SUBCUTANEOUS at 20:57

## 2023-03-13 RX ADMIN — INSULIN LISPRO 10 UNITS: 100 INJECTION, SOLUTION INTRAVENOUS; SUBCUTANEOUS at 08:42

## 2023-03-13 RX ADMIN — CYANOCOBALAMIN TAB 1000 MCG 1000 MCG: 1000 TAB at 08:42

## 2023-03-13 RX ADMIN — DICLOXACILLIN SODIUM 500 MG: 250 CAPSULE ORAL at 12:53

## 2023-03-13 RX ADMIN — BUMETANIDE 1 MG: 0.25 INJECTION INTRAMUSCULAR; INTRAVENOUS at 18:08

## 2023-03-13 RX ADMIN — SODIUM CHLORIDE, PRESERVATIVE FREE 10 ML: 5 INJECTION INTRAVENOUS at 08:52

## 2023-03-13 RX ADMIN — DICLOXACILLIN SODIUM 500 MG: 250 CAPSULE ORAL at 18:09

## 2023-03-13 RX ADMIN — CARVEDILOL 6.25 MG: 6.25 TABLET, FILM COATED ORAL at 18:09

## 2023-03-13 RX ADMIN — ATORVASTATIN CALCIUM 20 MG: 10 TABLET, FILM COATED ORAL at 20:58

## 2023-03-13 RX ADMIN — INSULIN LISPRO 10 UNITS: 100 INJECTION, SOLUTION INTRAVENOUS; SUBCUTANEOUS at 18:09

## 2023-03-13 RX ADMIN — ENOXAPARIN SODIUM 30 MG: 100 INJECTION SUBCUTANEOUS at 20:58

## 2023-03-13 RX ADMIN — CARVEDILOL 6.25 MG: 6.25 TABLET, FILM COATED ORAL at 08:42

## 2023-03-13 RX ADMIN — BUMETANIDE 1 MG: 0.25 INJECTION INTRAMUSCULAR; INTRAVENOUS at 08:55

## 2023-03-13 ASSESSMENT — PAIN - FUNCTIONAL ASSESSMENT: PAIN_FUNCTIONAL_ASSESSMENT: ACTIVITIES ARE NOT PREVENTED

## 2023-03-13 ASSESSMENT — PAIN DESCRIPTION - ORIENTATION: ORIENTATION: RIGHT;LEFT

## 2023-03-13 ASSESSMENT — ENCOUNTER SYMPTOMS
ABDOMINAL PAIN: 0
CHEST TIGHTNESS: 0
DIARRHEA: 0
VOMITING: 0
SHORTNESS OF BREATH: 1

## 2023-03-13 ASSESSMENT — PAIN DESCRIPTION - LOCATION: LOCATION: LEG

## 2023-03-13 ASSESSMENT — PAIN SCALES - GENERAL: PAINLEVEL_OUTOF10: 3

## 2023-03-13 ASSESSMENT — PAIN DESCRIPTION - DESCRIPTORS: DESCRIPTORS: DISCOMFORT

## 2023-03-13 ASSESSMENT — PAIN DESCRIPTION - PAIN TYPE: TYPE: ACUTE PAIN

## 2023-03-13 NOTE — CARE COORDINATION
Case Management Assessment  Initial Evaluation    Date/Time of Evaluation: 3/13/2023 4:18 PM  Assessment Completed by: Catrina Arnett    If patient is discharged prior to next notation, then this note serves as note for discharge by case management.    Patient Name: Carol Berrios                   YOB: 1965  Diagnosis: Acute pulmonary edema (HCC) [J81.0]  Acute on chronic respiratory failure with hypoxemia (HCC) [J96.21]  Acute on chronic heart failure with preserved ejection fraction (HFpEF) (HCC) [I50.33]                   Date / Time: 3/11/2023  7:01 PM    Patient Admission Status: Inpatient   Readmission Risk (Low < 19, Mod (19-27), High > 27): Readmission Risk Score: 19.5    Current PCP: No primary care provider on file.  PCP verified by CM? Yes    Chart Reviewed: Yes      History Provided by: Patient  Patient Orientation: Alert and Oriented    Patient Cognition: Alert    Hospitalization in the last 30 days (Readmission):  No    If yes, Readmission Assessment in CM Navigator will be completed.    Advance Directives:      Code Status: Full Code   Patient's Primary Decision Maker is: Named in Scanned ACP Document    Primary Decision Maker: jamie steven - Da - [phone]    Discharge Planning:    Patient lives with: Alone Type of Home: Apartment  Primary Care Giver: Self  Patient Support Systems include: Children   Current Financial resources: None  Current community resources: None  Current services prior to admission: Oxygen Therapy            Current DME:              Type of Home Care services:  PT    ADLS  Prior functional level: Independent in ADLs/IADLs  Current functional level: Independent in ADLs/IADLs    PT AM-PAC: 13 /24  OT AM-PAC:   /24    Family can provide assistance at DC: No  Would you like Case Management to discuss the discharge plan with any other family members/significant others, and if so, who? No  Plans to Return to Present Housing: No (PT/OT rec. ARU/Swing Bed for  further rehab.)  Other Identified Issues/Barriers to RETURNING to current housing: rehab needed  Potential Assistance needed at discharge: Outpatient PT/OT            Potential DME:    Patient expects to discharge to: [address] [place] [address] for transportation at discharge:      Financial    Payor: Marco Clemente / Plan: Cande Lovell ESSENTIAL/PLUS / Product Type: *No Product type* /     Does insurance require precert for SNF: Yes    Potential assistance Purchasing Medications: Yes  Meds-to-Beds request:        [facility]/pharmacy #[facility] [address] [place]  Phone: [phone] Fax: [phone]      Notes:    Factors facilitating achievement of predicted outcomes: Family support, Cooperative, Pleasant, Has needed Durable Medical Equipment at home, and Home is wheelchair accessible    Barriers to discharge: Limited family support    Additional Case Management Notes: Student reviewed chart and spoke to pt in room. Pt lives alone in 1 story apartment on the 1st floor with no MANDIE. Pt has a front wheeled walker but no other DME. PT is independent of her ADLs. Pt has PCP and insurance to help with healthcare and Rx costs. PT/OT recommend Swing Bed/ARU. Pt is agreeable to Swing and ARU. Student explained that if those both deny, the next step down would be a SNF. Pt was recently at [facility] for IV antibiotics and rehab. Pt says that they treated her horribly and would not like to return ever. Pt does not know if she would be open to going to another one if needed but is open to it once Swing Bed and ARU options are tried. Plan is discharge to [facility]. If Swing Bed denies, then try ARU. If ARU denies, then pt will think about SNF but wants to talk to daughter first and see about the first 2 options. Referral has been made to KEITH KING at [facility].     The Plan for Transition of Care is related to the following treatment goals of Acute pulmonary edema (HCC) [J81.0]  Acute on chronic respiratory failure with hypoxemia (HCC) [J96.21]  Acute on chronic heart failure with preserved ejection fraction (HFpEF) [facility] [D71.77]    IF APPLICABLE: The Patient and/or patient representative Debi Gaitan and her family were provided with a choice of provider and agrees with the discharge plan. Freedom of choice list with basic dialogue that supports the patient's individualized plan of care/goals and shares the quality data associated with the providers was provided to: Debi Gaitan    Patient Representative Name:   N/A    The Patient and/or Patient Representative Agree with the Discharge Plan?   Yes    Pratik Moralez  Case Management Department  Ph: [phone]   Fax: [phone]

## 2023-03-13 NOTE — PROGRESS NOTES
Physical Therapy  [place] [address] ACUTE CARE PHYSICAL THERAPY EVALUATION  Denise Burgess, 1965, 3005/3005-A, 3/13/2023    History  [place]:  The primary encounter diagnosis was Acute pulmonary edema ([facility] A diagnosis of Acute on chronic respiratory failure with hypoxemia [facility]) was also pertinent to this visit. Patient  has a past medical history of Sepsis ([facility] Patient  has no past surgical history on file. Subjective:  Patient states: \"That rehab place was a joke\"   Pain:  denies   Communication with other providers:  RNAdolfo; co-eval with OTGloria + Student OTBelle   Restrictions: general precautions, falls     Home Setup/Prior level of function  Social/Functional History  Lives With: Alone  Type of Home: Apartment  Home Access: Level entry  Bathroom Shower/Tub: Tub/Shower unit  Bathroom Toilet: Standard  Has the patient had two or more falls in the past year or any fall with injury in the past year?: Yes  Receives Help From: Family (daughter and sister can stop by, no 24/7)  DME: RW, 2L O2 (reports [facility] was supposed to get her a shower chair and other DME)  ADL Assistance: Independent  Homemaking Assistance: Independent  Homemaking Responsibilities: Yes  Ambulation Assistance: Independent  Transfer Assistance: Independent  Active : Yes  Occupation: On disability  Additional comments: Pt was just discharged home from [facility] and was only home for a few hours before being admitted to the hospital.     Examination of body systems (includes body structures/functions, activity/participation limitations):  Observation:  Supine in bed upon arrival. Cooperative with therapy. Inc swelling to bilat LES.    Vision:  [facility]  Hearing:  WFL  Cardiopulmonary:  3L O2, SPO2 87% with activity recovering to 90% or higher within 1 minute of seated rest.     Musculoskeletal  ROM R/L:  [facility] BLEs  Strength R/L:  BLEs grossly 4+/5  Neuro: slightly diminished sensation to feet (possibly due to the significant swelling)     Mobility/treatment:   Rolling L/R:  NT   Supine to sit:  Aroldo for trunk support with HOB elevated ~45 deg and use of bed rail   Scooting: modA for assisting with hip advancement fwd to EOB with HHA   Transfers:   Sit to stand: Aroldo for steadying from EOB  Stand to sit: Aroldo for small amount of control lowering to recliner  Step pivot: CGA for safety with RW   Sitting balance:  SBA for safety at EOB, static and light dynamic x ~10 minutes while managing grooming and hygiene tasks with single to no UE support   Standing balance:  static stance at RW progressing to light dynamic with single UE support while working to manage part of LB dressing. Tolerated ~3 minutes. Gait: ~3ft to recliner with RW CGA for safety. Dec bilat foot clearance and slightly fwd posture. Limited distance due to feeling SOB from previous standing activity. Educated pt on POC, role of PT, DME use, discharge recommendations. [facility] 6 Clicks Inpatient Mobility:  AM-PAC Inpatient Mobility Raw Score : 13    Safety: patient left in chair with alarm, call light within reach, RN notified, gait belt used. Assessment:  Pt is a 62year old female admitted with SOB and LE swelling. Diagnosed with acute on chronic heart failure with hx of CHF and acute on chronic hypoxic and hypercapnic respiratory failure. Recommend ARU once medically stable. At baseline she is indep with gross mobility and ADLs/IADLs. She performed below her baseline this date with dec strength, balance, and activity tolerance. She would benefit from continued therapy to address her current deficits, dec potential fall risk, dec burden of care, and restore PLOF. Complexity: Moderate  Prognosis: Good, no significant barriers to participation at this time. Plan: 3-5 times per week  Discharge Recommendations: IP Rehab, swing bed would be recommended if pt cannot go to ARU.    Equipment: continue to assess at next level of care     Goals:  Short Term Goals  Time Frame for Short Term Goals: 2 weeks  Short Term Goal 1: Pt will perform sit><supine SBA  Short Term Goal 2: Pt will transfer to all surfaces SBA  Short Term Goal 3: Pt will ambulate 75ft with LRAD SBA  Short Term Goal 4: Pt will perform standing light dynamic activity x 3 minutes, single UE support if needed SBA       Treatment plan:  Bed mobility, transfers, balance, gait, TA, TX, WC, stairs     Recommendations for NURSING mobility: stand step pivot with RW     Time:   Time in: 1328  Time out: 1359  Timed treatment minutes: 16  Total time: 31 minutes     Electronically signed by:    Kristen Keller [id number]  3/13/2023, 3:53 PM

## 2023-03-13 NOTE — PROGRESS NOTES
Met with patient and introduced myself as the Heart failure education R.N. Patient very pale and weak. Hemoglobin 7.3. Blood transfusion ordered. Patient recently discharged from SNF to home. States she was only home a few hours. She was too weak too get up from chair. Admitting diagnosis- Acute on chronic heart failure  Cardiologist- Dr. Kelly Ast Education Nurse consulted- yes   Ejection fraction 60% as of 3/11/23 with grade II DD  Pro BNP- 8,871 on 3/11/23  Hospital follow up appt-  pending disposition  Cardiac rehabilitation referral- N/A   ICD information-N/A   Smoking Cessation information and referral- N/A   Pneumonia vaccine- not discussed  PCP- [facility]  Patient has a digital scale? States she has scale   Transportation- Has assistance from daughter and sisters    Able to obtain medications without difficulty? - Yes- Patient denies difficulty in obtaining or taking medications. Advised not to stop taking a medication without notifying provider. Heart failure specific Medications-  Bumex Coreg  Reviewed Heart failure patient education book with patient. Questions answered. Patient's heart failure medications reviewed and information given on each. Reviewed the Stop Light Handout with patient and instructed when to call her provider. Patient was engaged and attentive during education session. The following handouts were reviewed with patient and patient was given a copy of the following : Heart Failure education booklet, a link to the American Heart Association's Healthier Living with Heart Failure Interactive workbook and a list of heart failure related education available on the hospital TV and how to access it.

## 2023-03-13 NOTE — PROGRESS NOTES
Physician Progress Note      Nazario Duncan  CSN #:                  [id number]  :                       1965  ADMIT DATE:       3/11/2023 7:01 PM  [address] [place] DATE:  RESPONDING  PROVIDER #:        Yessenia Franz MD          QUERY TEXT:    Patient admitted with acute on chronic systolic CHF. Noted documentation of   type II MI in PN dated 3/13/23 by Dr. Marc Bass. In order to support the   diagnosis of type II MI, please refer to 4th universal definition of MI below   and include additional clinical indicators in your documentation. ? Or please   document if the diagnosis of type II MI has been ruled out after study. The medical record reflects the following:  ?? Risk Factors: CHF  ? ? Clinical Indicators: Pt has acute on chronic systolic CHF. H&P indicates pt   has, \"NSTEMI Type II 2/2 likely demand ischemia 2/2 decompensated heart   failure. \" Pt denies chest pain, troponin 0.060, 0.062. EKG shows no ischemia. Consult note dated 3/13/23, by OWEN Gill, cardiology consultant   indicates elevated troponin likely secondary to demand ischemia in setting of   anemia and CHF. ?? Treatment: labs, imaging, cardiology consult, EKG, tele    Thank you,  Abiodun Soliman, RN  [phone]    Fourth Universal Definition of Myocardial Infarction:  Clearly separates MI   from myocardial injury. Patients with elevated blood troponin levels but   without clinical evidence of ischemia are said to have had a myocardial   injury. ? To have a myocardial infarction requires both an elevated troponin   blood test along with at least one of the following:  - Symptoms of acute myocardial ischemia (Types 1 - 5 MI)  - Clinical evidence of ischemia, as evidenced in an electrocardiogram (EKG)   showing new ischemic changes (Type 1, Type 2, Type 3, or Type 4a MI)  - Development of pathological Q waves (Types 1 - 5 MI)  - Imaging evidence of new loss of viable myocardium or new regional wall   motion abnormality in a pattern consistent with an ischemic etiology (Types 1   - 5 MI)  Options provided:  -- MI type II due to acute systolic CHF exacerbation present as evidenced by,   Please document indicators of myocardial infarction.   -- MI type II ruled out after study and demand ischemia due to acute systolic   CHF exacerbation confirmed  -- Other - I will add my own diagnosis  -- Disagree - Not applicable / Not valid  -- Disagree - Clinically unable to determine / Unknown  -- Refer to Clinical Documentation Reviewer    PROVIDER RESPONSE TEXT:    This patient has a type II MI due to acute systolic CHF exacerbation as   evidenced by    Query created by: Toni Rasheed on 3/13/2023 12:34 PM      Electronically signed by:  Nilesh Burt MD 3/13/2023 1:51 PM

## 2023-03-13 NOTE — PROGRESS NOTES
Occupational Therapy  [facility] ACUTE CARE OCCUPATIONAL THERAPY EVALUATION    Tawny Reed, 1965, 3005/3005-A, 3/13/2023    Discharge Recommendation: Inpatient Rehab ([address] [facility] bed preferred)      History:  [place]:  The primary encounter diagnosis was Acute pulmonary edema ([address] A diagnosis of Acute on chronic respiratory failure with hypoxemia [facility]) was also pertinent to this visit. Subjective:  Patient states: \"I didn't have the best experience at the skilled nursing facility\"  Pain: Pt denied pain this date  Communication with other providers: co-eval w/ PT Regina  Restrictions: General Precautions, Fall Risk, 3L O2, Telemetry, Pulse Ox,BP cuff     Home Setup/Prior level of function:   Social/Functional History  Lives With: Alone  Type of Home: Apartment  Home Access: Level entry  Bathroom Shower/Tub: Tub/Shower unit  Bathroom Toilet: Standard  Has the patient had two or more falls in the past year or any fall with injury in the past year?: Yes  Receives Help From: Family (daughter and sister can stop by, no 24/7)  DME: RW, 2L O2 (reports [address] [facility] was supposed to get her a shower chair and other DME)  ADL Assistance: Independent  Homemaking Assistance: Independent  Homemaking Responsibilities: Yes  Ambulation Assistance: Independent (typically no AD; using RW w/ recent admission)  Transfer Assistance: Independent  Active : Yes  Occupation: On disability  Additional comments: Pt was just discharged home from [facility] and was only home for a few hours before being admitted to the hospital.     Examination:  Observation: Supine in bed upon arrival. Pt agreeable to evaluation.   Vision: [facility]  Hearing: WFL  Vitals: SpO2 87% on 3L O2 w/ activity, pt recovered to 90+% within 1 min of seated rest break    Body Systems and functions:  ROM: WFL in BL UEs   Strength: 4- to 4/5 in BL UEs   Sensation: WFL (denies numbness/tingling in UEs)  Tone: Normal  Coordination: WFL  Perception: WNL    Activities of Daily Living (ADLs):  Feeding: Independent   Grooming: SBA (while seated; pt able to complete grooming task combing hair seated EOB SBA, complete oral and facial hygiene seated in chair SBA)  UB bathing: Min A (for thoroughness)  LB bathing: Dependent   UB dressing: Min A (assist for mgmt down back/behind trunk)  LB dressing: Max A (pt dep for donning BL socks, pt required assist to initially thread BL LEs, pt able to manage brief to knees while seated, pt attempted to manage to hips w/ CGA while standing w/ dec success)  Toileting: Dependent     Cognitive and Psychosocial Functioning:  Overall cognitive status: WFL- required cues for initiation, sequencing, and safe hand placement; some cues for problem solving/awareness of errors  Affect: Normal     Balance:   Sitting: SBA unsupported sitting EOB   Standing: CGA w/ RW ~3 min during LB dressing/hygiene    Functional Mobility:  Bed Mobility: Min A supine to sitting EOB w/ cues for uprighting trunk  Transfers: Min A sit to stand from EOB, Min A stand to sit to EOB to chair (cues for sequencing/hand placement)     Ambulation: CGA w/ RW ~3ft (pt fatigued d/t SOB) (see PT eval for gait assessment)      AM-PAC 6 click short form for inpatient daily activity:   How much help from another person does the patient currently need. .. Unable  Dep A Lot  Max A A Lot   Mod A A Little  Min A A Little   CGA  SBA None   Mod I  Indep  Sup   1. Putting on and taking off regular lower body clothing? [] 1    [x] 2   [] 2   [] 3   [] 3   [] 4      2. Bathing (including washing, rinsing, drying)? [] 1   [x] 2   [] 2 [] 3 [] 3 [] 4   3. Toileting, which includes using toilet, bedpan, or urinal? [x] 1    [] 2   [] 2   [] 3   [] 3   [] 4     4. Putting on and taking off regular upper body clothing? [] 1   [] 2   [] 2   [x] 3   [] 3    [] 4      5. Taking care of personal grooming such as brushing teeth? [] 1   [] 2    [] 2 [] 3    [x] 3   [] 4      6. Eating meals?    [] 1   [] 2   [] 2   [] 3   [] 3   [x] 4      Raw Score:  15     [24=0% impaired(CH), 23=1-19%(CI), 20-22=20-39%(CJ), 15-19=40-59%(CK), 10-14=60-79%(CL), 7-9=80-99%(CM), 6=100%(CN)]     Treatment:  Self Care Training:   Cues were given for safety, sequence, UE/LE placement, visual cues, and balance. Activities performed today included LB dressing/bathing tasks, facial and oral hygiene, and grooming task combing hair seated      Safety Measures: Gait belt used, Left in Chair, Alarm in place    Assessment:  Pt is a 61 yo F w/ a past medical history of Sepsis ([facility] Pt admitted w/ SOB and dx w/acute on chronic heart failure. Pt w/ [facility]. Pt was just discharged home from [address] [place] [address] Road and was only home for a few hours before being admitted to the hospital. Pt lives alone and at baseline is IND w/ ADLs, IADLs, functional mobility (typically no AD), and driving. Pt presents with the above impairments and would benefit from acute care OT services at discharge. Recommend inpatient rehab ([address] [place] [address] preferred) at discharge. Complexity: Moderate  Prognosis: Good  Plan: 3+x/week      Goals:  1. Pt will complete all aspects of bed mobility for EOB/OOB ADLs SBA   2. Pt will complete UB/LB bathing Mod A w/ long handled sponge PRN   3. Pt will complete all aspects of LB dressing Mod A w/ AE PRN   4. Pt will complete all functional transfers to and from bed, chair, toilet, shower chair CGA   5. Pt will ambulate HH distance to bathroom for toileting CGA w/ RW   6. Pt will complete all aspects of toileting task Max A   7. Pt will complete oral hygiene/grooming routine in standing at sink CGA  8.  Pt will complete ther ex/ther act with focus on UE strengthening and standing balance/tolerance >5 min needed for ADL tasks             Time:   Time in: 1328  Time out: 1359  Timed treatment minutes: 16  Total time: 31      Electronically signed by:    Miguel Gutierrez S/OT     Deborra Light, OTR/L, MOT, [id number]

## 2023-03-13 NOTE — CONSULTS
Consult completed. Procedure/rationale explained to pt & consent obtained. #20ga Arrow Endurance Extended Dwell PIV Catheter initiated to LUE Cephalic Vein using sterile, UltraSound-guided technique without difficulty/complications. Positioning verified via UltraSound visualization of catheter within vessel lumen; site returns blood briskly and flushes without resistance/abnormalities. Sterile dressing with SkinPrep, 3M CHG securing device/DSSG, SwabCap, and Limb Precautions band applied. Pt tolerated well & no other c/o or needs noted or reported. During US evaluation of vasculature for line placement, noncompressible echogenic area noted extending through RUE forearm suspicious for DVT - US ordered per protocol, Ronen Simms, Primary RN notified, and will notify Provider upon official reading/results.

## 2023-03-13 NOTE — CONSULTS
JEANNA ([facility]ILIT[facility] [place]  [address]  Phone: [phone]    Fax [phone]                  Stacy Holbrook MD, Iglesia Ponce MD, [address] Sandra Portillo MD, MD Tony Tavera MD Tyrus Nine, MD Jeane Nipper, MD Constantino Ott, APRN      Tanner Ghosh, MASON Roberto, APRDMITRIY Mendez, APRN  Olaf Mendoza PA-C    CARDIOLOGY CONSULT NOTE     Reason for consultation:  CHF    Referring physician:  Dr. Desiree Mortimer    Primary care physician: No primary care provider on file. Thank you for the consult. Chief Complaints :  Chief Complaint   Patient presents with    Shortness of Breath     Recently discharged from an ECF. States that upon arriving home pt started to have SOB w/ Wheezing. 2 Albuterol, 1 Atrovent given by EMS with positive effect. History of present illness:Carol is a 62 y. o.year old  female with a past medical history of HFpEF, COPD, morbid obesity, hypertension. Patient presents with increased shortness of breath and lower extremity edema. Cardiology was consulted for acute on chronic decompensated heart failure with preserved ejection fraction. Patient was also noted to have mildly elevated troponin without the presence of chest pain. Patient has had heart failure in the past and has received a cardiac catheterization back in 2015 which did not reveal any significant coronary artery disease. Patient was also noted to have anemia with steadily decreasing hemoglobin, hemoglobin today was 7.3. Patient has not noted any obvious signs of bleeding at this time    proBNP 8071 potassium 3.9, magnesium 1.6, calcium 10.8, troponin 0.06x2, cholesterol 97, HDL 35, LDL 37, hemoglobin A1c 9.5    Past medical history:    has a past medical history of Sepsis ([address] Past surgical history:   has no past surgical history on file. Social History:   reports that she has never smoked.  She has never used smokeless tobacco. She reports that she does not currently use alcohol. She reports that she does not currently use drugs.   Family history:   no family history of CAD, STROKE of DM at early age    No Known Allergies    carvedilol (COREG) tablet 6.25 mg, BID WC  bumetanide (BUMEX) injection 1 mg, BID  insulin lispro (HUMALOG) injection vial 0-4 Units, TID WC  insulin lispro (HUMALOG) injection vial 0-4 Units, Nightly  glucose chewable tablet 16 g, PRN  dextrose bolus 10% 125 mL, PRN   Or  dextrose bolus 10% 250 mL, PRN  glucagon (rDNA) injection 1 mg, PRN  dextrose 10 % infusion, Continuous PRN  atorvastatin (LIPITOR) tablet 20 mg, QPM  sodium chloride flush 0.9 % injection 5-40 mL, 2 times per day  sodium chloride flush 0.9 % injection 5-40 mL, PRN  0.9 % sodium chloride infusion, PRN  ondansetron (ZOFRAN-ODT) disintegrating tablet 4 mg, Q8H PRN   Or  ondansetron (ZOFRAN) injection 4 mg, Q6H PRN  polyethylene glycol (GLYCOLAX) packet 17 g, Daily PRN  acetaminophen (TYLENOL) tablet 650 mg, Q6H PRN   Or  acetaminophen (TYLENOL) suppository 650 mg, Q6H PRN  enoxaparin Sodium (LOVENOX) injection 30 mg, BID  potassium chloride (KLOR-CON M) extended release tablet 40 mEq, PRN   Or  potassium bicarb-citric acid (EFFER-K) effervescent tablet 40 mEq, PRN   Or  potassium chloride 10 mEq/100 mL IVPB (Peripheral Line), PRN  magnesium sulfate 2000 mg in 50 mL IVPB premix, PRN  insulin glargine (LANTUS) injection vial 30 Units, QPM  insulin lispro (HUMALOG) injection vial 10 Units, TID   vitamin B-12 (CYANOCOBALAMIN) tablet 1,000 mcg, Daily  dicloxacillin (DYNAPEN) capsule 500 mg, 4x Daily      Current Facility-Administered Medications   Medication Dose Route Frequency Provider Last Rate Last Admin    carvedilol (COREG) tablet 6.25 mg  6.25 mg Oral BID  Tommy Stubbs MD   6.25 mg at 03/13/23 0842    bumetanide (BUMEX) injection 1 mg  1 mg IntraVENous BID Tommy Stubbs MD   1 mg at 03/13/23 3768 insulin lispro (HUMALOG) injection vial 0-4 Units  0-4 Units SubCUTAneous TID WC Tommy Campbell MD        insulin lispro (HUMALOG) injection vial 0-4 Units  0-4 Units SubCUTAneous Nightly Tommy Campbell MD        glucose chewable tablet 16 g  4 tablet Oral PRN Tommy Campbell MD        dextrose bolus 10% 125 mL  125 mL IntraVENous PRN Katia Gannon MD        Or    dextrose bolus 10% 250 mL  250 mL IntraVENous PRN Katia Gannon MD        glucagon (rDNA) injection 1 mg  1 mg SubCUTAneous PRN Katia Gannon MD        dextrose 10 % infusion   IntraVENous Continuous PRN Tommy Campbell MD        atorvastatin (LIPITOR) tablet 20 mg  20 mg Oral QPM Katia Gannon MD   20 mg at 03/12/23 2100    sodium chloride flush 0.9 % injection 5-40 mL  5-40 mL IntraVENous 2 times per day Katia Gannon MD   10 mL at 03/13/23 0852    sodium chloride flush 0.9 % injection 5-40 mL  5-40 mL IntraVENous PRN Katia Gannon MD        0.9 % sodium chloride infusion   IntraVENous PRN Katia Gannon MD        ondansetron (ZOFRAN-ODT) disintegrating tablet 4 mg  4 mg Oral Q8H PRN Tommy Campbell MD        Or    ondansetron [facility]) injection 4 mg  4 mg IntraVENous Q6H PRN Katia Gannon MD        polyethylene glycol (GLYCOLAX) packet 17 g  17 g Oral Daily PRN Tommy Campbell MD        acetaminophen (TYLENOL) tablet 650 mg  650 mg Oral Q6H PRN Tommy Campbell MD        Or    acetaminophen (TYLENOL) suppository 650 mg  650 mg Rectal Q6H PRN Tommy Campbell MD        enoxaparin Sodium (LOVENOX) injection 30 mg  30 mg SubCUTAneous BID Tommy Campbell MD   30 mg at 03/13/23 0842    potassium chloride (KLOR-CON M) extended release tablet 40 mEq  40 mEq Oral PRN Katia Gannon MD        Or    potassium bicarb-citric acid (EFFER-K) effervescent tablet 40 mEq  40 mEq Oral PRN Tommy Campbell MD        Or    potassium chloride 10 mEq/100 mL IVPB (Peripheral Line)  10 mEq IntraVENous PRN Tommy Starks MD        magnesium sulfate 2000 mg in 50 mL IVPB premix  2,000 mg IntraVENous PRN Barbie Howard MD   Stopped at 03/13/23 1042    insulin glargine (LANTUS) injection vial 30 Units  30 Units SubCUTAneous QPM Tommy Starks MD   30 Units at 03/12/23 2102    insulin lispro (HUMALOG) injection vial 10 Units  10 Units SubCUTAneous TID WC Tommy Starks MD   10 Units at 03/13/23 0842    vitamin B-12 (CYANOCOBALAMIN) tablet 1,000 mcg  1,000 mcg Oral Daily Tommy Starks MD   1,000 mcg at 03/13/23 0842    dicloxacillin (DYNAPEN) capsule 500 mg  500 mg Oral 4x Daily Tommy Starks MD   500 mg at 03/12/23 0530       Review of Systems   Constitutional:  Positive for fatigue. Negative for diaphoresis and fever. Eyes:  Negative for visual disturbance. Respiratory:  Positive for shortness of breath. Negative for chest tightness. Cardiovascular:  Positive for leg swelling. Negative for chest pain and palpitations. Gastrointestinal:  Negative for abdominal pain, diarrhea and vomiting. Endocrine: Negative for cold intolerance and heat intolerance. Musculoskeletal:  Negative for arthralgias. Skin:  Negative for pallor and rash. Neurological:  Negative for dizziness, syncope, light-headedness and headaches. Psychiatric/Behavioral:  Negative for dysphoric mood. The patient is not nervous/anxious. Physical Examination:    Vitals:    03/13/23 0145 03/13/23 0645 03/13/23 0822 03/13/23 1030   BP:   (!) 146/86    Pulse:   77 82   Resp: 18  18    Temp: 98 °F (36.7 °C)  98 °F (36.7 °C)    TempSrc: Oral  Oral    SpO2:   98% 95%   Weight:  (!) 352 lb (159.7 kg)     Height:           Physical Exam  Constitutional:       General: She is not in acute distress. Appearance: She is obese. She is not diaphoretic. HENT:      Head: Normocephalic and atraumatic.       Right Ear: External ear normal.      Left Ear: External ear normal.      Nose: Nose normal.      Mouth/Throat:      Mouth: Mucous membranes are moist.   Eyes:      Extraocular Movements: Extraocular movements intact. Comments: Pupils equal and round   Neck:      Vascular: No carotid bruit. Cardiovascular:      Rate and Rhythm: Normal rate and regular rhythm. Pulses: Normal pulses. Heart sounds: S1 normal and S2 normal. No murmur heard. No friction rub. No gallop. Pulmonary:      Effort: No respiratory distress. Breath sounds: No rales. Comments: 2 L supplemental oxygen  Chest:      Chest wall: No tenderness. Abdominal:      Palpations: Abdomen is soft. Tenderness: There is no abdominal tenderness. Musculoskeletal:      Right lower le+ Pitting Edema present. Left lower le+ Pitting Edema present. Skin:     General: Skin is warm and dry. Capillary Refill: Capillary refill takes less than 2 seconds. Findings: No rash. Comments: Skin turgor brisk   Neurological:      Mental Status: She is alert and oriented to person, place, and time. Mental status is at baseline. Psychiatric:         Behavior: Behavior is cooperative. Thought Content:  Thought content normal.         Judgment: Judgment normal.          Medical decision making and Data review:    Lab Review   Recent Labs     23  0023   WBC 5.5   HGB 7.3*   HCT 26.7*         Recent Labs     23  0023      K 3.9      CO2 36*   PHOS 3.4   BUN 18   CREATININE 1.0     Recent Labs     23  1920   AST 12*   ALT <5*   BILITOT 0.4   ALKPHOS 113     Recent Labs     23  1920 23  0414   TROPONINT 0.060* 0.062*       Recent Labs     23   PROBNP 8,871*     No results found for: INR, PROTIME    EKG:Reviewed by me  Normal sinus rhythm  with evidence of inferior and septal t wave inversions    ECHO:pending    Chest Xray: 23  Mild to moderate cardiomegaly with mild interstitial edema and small bilateral pleural effusions, findings which can be seen with congestive heart failure. Patchy opacities overlying the bilateral middle and lower lung zones could represent alveolar edema with underlying infection not excluded. All labs, medications and tests reviewed by myself including data  from outside source , patient and available family . Continue all other medications of all above medical condition listed as is. Impression:  Principal Problem:    Acute on chronic heart failure with preserved ejection fraction (HFpEF) ([address]  Resolved Problems:    * No resolved hospital problems. *      Assessment: 62 y. o.year old with   Acute on chronic HFpEF  -Patient does appear volume overloaded with increased shortness of breath and lower extremity edema  -Continue daily weights and strict I&O. -We will check echo  -Continue IV Bumex 1 mg twice daily, consider switching to oral tomorrow  -Continue Coreg 6.25 mg twice daily  Elevated troponin  -No chest pain at this time. Endorsing SOB but likely secondary to COPD/obesity  -Likely secondary to demand ischemia in setting of anemia and CHF  -Non-ACS trend, EKG nonischemic  Hypertension  -Blood pressure mildly elevated. Continue Coreg  Acute on chronic Anemia  -Hgb of 7.3 and down-trending. May benefit from transfusion  -Per primary care  Type 2 diabetes mellitus  -Per primary care  COPD  -Per primary care. Baseline 2 L O2 requirement at home  Morbid obesity: BMI 58        Thank you  much for consult and giving us the opportunity in contributing in the care of this patient. Please feel free to call me for any questions. Xin Jarrett PA-C, 3/13/2023 11:11 AM       CARDIOLOGY ATTENDING ADDENDUM    MEDICAL DECISION MAKING:    Acute HFpEF: Continue with IV Bumex 1 mg twice a day. Based on the response we will decide on further dose adjustments. Mildly elevated troponin: Please trend troponins to peak.   She does not have any chest discomfort I doubt that this is from ischemia. In case troponin trend is rising we will then consider ischemic work-up. HPI:  I have reviewed the HPI  And agree with above   Edy Bates is a 62 y. o.year old who and presents with had concerns including Shortness of Breath (Recently discharged from an ECF. States that upon arriving home pt started to have SOB w/ Wheezing. 2 Albuterol, 1 Atrovent given by EMS with positive effect. ). Chief Complaint   Patient presents with    Shortness of Breath     Recently discharged from an ECF. States that upon arriving home pt started to have SOB w/ Wheezing. 2 Albuterol, 1 Atrovent given by EMS with positive effect. Please review addendum/changes made to note above   Interval history: 44-year-old female with morbid obesity who was admitted with progressive shortness of breath and leg swelling. Clinically she is in heart failure with elevated JVP and leg edema. I agree with the plan, which was planned by myself and discussed with advanced level provider. My documented MDM is a substantive portion of the supervisory note. I have seen ,spoken to  and examined this patient personally, independently of the advanced level provider. I have spent substantiate  portion of this encounter independently myself in examining patient and developing the medical management plan . I have reviewed the hospital care given to date and reviewed all pertinent labs and imaging. The plan was developed mutually at the time of the visit with the patient,  NP /PA  and myself. I have spoken with patient, nursing staff and provided written and verbal instructions . The above note has been reviewed and I agree with the assessment, diagnosis, and treatment plan with changes made by me as follows .     Chikis Gonzales MD

## 2023-03-13 NOTE — CONSULTS
Nutrition Education    Educated on Heart failure nutrition therapy. Learners: Patient  Readiness: Acceptance  Method: Explanation, Handout, and Teachback  Response: Demonstrated Understanding and Needs Reinforcement  Denies any additional questions at this time. Contact name and number provided.     Jose Yo RD, LD  Contact Number: [phone]

## 2023-03-13 NOTE — CONSENT
Informed Consent for Blood Component Transfusion Note    I have discussed with the patient the rationale for blood component transfusion; its benefits in treating or preventing fatigue, organ damage, or death; and its risk which includes mild transfusion reactions, rare risk of blood borne infection, or more serious but rare reactions. I have discussed the alternatives to transfusion, including the risk and consequences of not receiving transfusion. The patient had an opportunity to ask questions and had agreed to proceed with transfusion of blood components.     Electronically signed by Charlene Mckeon MD on 3/13/23 at 1:50 PM EDT

## 2023-03-13 NOTE — PROGRESS NOTES
GI consult for anemia received- full consult to follow  Patient being treated for CHF  No gross blood in stool  Impression:    1) anemia- likely multifactorial- perhaps element of occult GI bleed        Plan:   1) check stool hemoccult x 3   2) endoscopic evaluation indicated but need to optimize cardiac status first   3) schedule colonoscopy/EGD when cardiology feels it is appropriate

## 2023-03-13 NOTE — PROGRESS NOTES
V2.0  [facility]      Name:  Kieran Warren /Age/Sex: 1965  (62 y.o. female)   MRN & CSN:  [id number] & [id number] Encounter Date/Time: 3/13/2023 11:26 AM EDT    Location:  [address] [place] [address] PCP: No primary care provider on file. Hospital Day: 3    Assessment and Plan:   Kieran Warren, a 62 y.o. female, with a history of IDDM, Class III Obesity, HFpEF, recent MSSA bacteremia with Endophthalmitis Rt. eye, RAMON, rhabdo, normocytic anemia due to anemia of chronic disease and B12 deficiency, COPD on 2 L/min home oxygen, hypertension, SAAD was admitted on 3/11/2023. They had concerns including Shortness of Breath (Recently discharged from an F. States that upon arriving home pt started to have SOB w/ Wheezing. 2 Albuterol, 1 Atrovent given by EMS with positive effect. ). Assessment  Acute on chronic decompensated HFpEF  She has a history of CHF  Presented today with shortness of breath -she was just discharged home from [facility] today after recent hospitalization at [facility]  Progressively worsening swelling in her legs -3+ bilaterally for me  Of note, she states that after she was discharged home, she went home and ate a large meatball sub. And she had not taken her Lasix on the day of admission  proBNP elevated at 8871 -which is above her last reading of 1415 on 2023  Chest x-ray with interstitial edema with bilateral small pleural effusions with what appears to be alveolar edema as well  Was given Lasix 40 mg IV x1 in the ER  On Bumex 1 mg twice daily wean off oxygen as tolerated obtain proBNP every third day,  Echocardiogram pending  Strict I's and O's     2.  Acute on chronic hypoxic and hypercapnic respiratory failure 2/2 above, probable SAAD/OHS, doubt infection  Patient is on baseline 2 L/min oxygen at home  Apparently, patient was found hypoxemic by EMS and was given breathing treatment and increased her nasal cannula to 4 L/min  She has no white count and her Pro-Kamron is negative  Blood cultures were sent by the emergency department  PCO2 56 on initial ABG     3.   NSTEMI type II 2/2 likely demand ischemia 2/2 decompensated heart failure  Troponin elevated 0.060, no chest pain     4. Lactic acidemia 2/2 likely type B 2/2 decompensated heart failure  Lactic acid is 2.5, repeat pending     5. Normocytic anemia 2/2 anemia of chronic disease and B12 deficiency  Hemoglobin is 7.3. She was started on B12 supplements recently  GI consult placed  Transfuse to get Hb >8     6. History of COPD on 2 L/min home oxygen     History of hypertension, hyperlipidemia     8. Insulin-dependent diabetes mellitus -recently started on insulin  Most recent A1c-9.5% (2/2023) despite low hemoglobin  At [facility], she was started on Lantus 30 units along with 10 units 3 times daily of Humalog with meals     9. Recent MSSA bacteremia and right eye endophthalmitis  She was recently here in [facility] and was transferred to ICU due to MSSA bacteremia with right eye endophthalmitis  She was supposed to be on oxacillin IV, however, because the patient was refusing it-this was changed to oral dicloxacillin 500 mg 4 times daily with an end date of 3/18/2023 by infectious disease at [address] [place] [address] taking dicloxacillin with end of treatment date of 3/18/2023      Recent RAMON and rhabdomyolysis     11. Class III obesity  BMI 53.25  12. Hypomagnesemia:   A. Replete as needed    Diet ADULT DIET; Regular; No Added Salt (3-4 gm)   DVT Prophylaxis [] Lovenox, []  Heparin, [] SCDs, [] Ambulation,  [] Eliquis, [] Xarelto  [] Coumadin   Code Status Full Code   Disposition From: Home  Expected Disposition: Home  Estimated Date of Discharge: 3 to 4 days  Patient requires continued admission due to needs aggressive diuresis prior to safe discharge   Surrogate Decision Maker/ POA      Subjective:     Chief Complaint: Shortness of Breath (Recently discharged from an ECF. States that upon arriving home pt started to have SOB w/ Wheezing.  2 Albuterol, 1 Atrovent given by EMS with positive effect.)     Diuresing well. David Stock. Still swelling in legs. Strict Is and Os. Daily weights. Review of Systems:    Review of Systems  Except above negative    Objective: Intake/Output Summary (Last 24 hours) at 3/13/2023 1016  Last data filed at 3/13/2023 0610  Gross per 24 hour   Intake --   Output 2900 ml   Net -2900 ml          Vitals:   Vitals:    03/13/23 0822   BP: (!) 146/86   Pulse: 77   Resp: 18   Temp:    SpO2: 98%       Physical Exam:   Physical Exam  Vitals reviewed. Constitutional:       Appearance: Normal appearance. She is obese. She is ill-appearing. HENT:      Head: Normocephalic. Right Ear: Tympanic membrane normal.      Left Ear: Tympanic membrane normal.      Nose: Nose normal.      Mouth/Throat:      Mouth: Mucous membranes are moist.   Eyes:      Extraocular Movements: Extraocular movements intact. Conjunctiva/sclera: Conjunctivae normal.      Pupils: Pupils are equal, round, and reactive to light. Cardiovascular:      Rate and Rhythm: Normal rate and regular rhythm. Pulses: Normal pulses. Heart sounds: No murmur heard. No friction rub. Pulmonary:      Effort: Respiratory distress present. Breath sounds: Rales present. No wheezing or rhonchi. Abdominal:      General: Abdomen is flat. Bowel sounds are normal. There is distension. Palpations: Abdomen is soft. Tenderness: There is no abdominal tenderness. Musculoskeletal:         General: No deformity. Normal range of motion. Cervical back: Normal range of motion and neck supple. Right lower leg: Edema present. Left lower leg: Edema present. Skin:     General: Skin is warm. Coloration: Skin is not jaundiced or pale. Neurological:      General: No focal deficit present. Mental Status: She is alert and oriented to person, place, and time. Mental status is at baseline. Motor: Weakness present.    Psychiatric: Mood and Affect: Mood normal.         Behavior: Behavior normal.          Medications:   Medications:    carvedilol  6.25 mg Oral BID WC    bumetanide  1 mg IntraVENous BID    insulin lispro  0-4 Units SubCUTAneous TID WC    insulin lispro  0-4 Units SubCUTAneous Nightly    atorvastatin  20 mg Oral QPM    sodium chloride flush  5-40 mL IntraVENous 2 times per day    enoxaparin  30 mg SubCUTAneous BID    insulin glargine  30 Units SubCUTAneous QPM    insulin lispro  10 Units SubCUTAneous TID WC    cyanocobalamin  1,000 mcg Oral Daily    dicloxacillin  500 mg Oral 4x Daily      Infusions:    dextrose      sodium chloride       PRN Meds: glucose, 4 tablet, PRN  dextrose bolus, 125 mL, PRN   Or  dextrose bolus, 250 mL, PRN  glucagon (rDNA), 1 mg, PRN  dextrose, , Continuous PRN  sodium chloride flush, 5-40 mL, PRN  sodium chloride, , PRN  ondansetron, 4 mg, Q8H PRN   Or  ondansetron, 4 mg, Q6H PRN  polyethylene glycol, 17 g, Daily PRN  acetaminophen, 650 mg, Q6H PRN   Or  acetaminophen, 650 mg, Q6H PRN  potassium chloride, 40 mEq, PRN   Or  potassium alternative oral replacement, 40 mEq, PRN   Or  potassium chloride, 10 mEq, PRN  magnesium sulfate, 2,000 mg, PRN      Labs      Recent Results (from the past 24 hour(s))   POCT Glucose    Collection Time: 03/12/23 12:03 PM   Result Value Ref Range    POC Glucose 175 (H) 70 - 99 MG/DL   POCT Glucose    Collection Time: 03/12/23  5:02 PM   Result Value Ref Range    POC Glucose 192 (H) 70 - 99 MG/DL   POCT Glucose    Collection Time: 03/12/23  8:44 PM   Result Value Ref Range    POC Glucose 210 (H) 70 - 99 MG/DL   Basic Metabolic Panel    Collection Time: 03/13/23 12:23 AM   Result Value Ref Range    Sodium 143 135 - 145 MMOL/L    Potassium 3.9 3.5 - 5.1 MMOL/L    Chloride 100 99 - 110 mMol/L    CO2 36 (H) 21 - 32 MMOL/L    Anion Gap 7 4 - 16    BUN 18 6 - 23 MG/DL    Creatinine 1.0 0.6 - 1.1 MG/DL    Est, Glom Filt Rate >60 >60 mL/min/1.73m2    Glucose 139 (H) 70 - 99 MG/DL    Calcium 7.8 (L) 8.3 - 10.6 MG/DL   Magnesium    Collection Time: 03/13/23 12:23 AM   Result Value Ref Range    Magnesium 1.6 (L) 1.8 - 2.4 mg/dl   Phosphorus    Collection Time: 03/13/23 12:23 AM   Result Value Ref Range    Phosphorus 3.4 2.5 - 4.9 MG/DL   CBC with Auto Differential    Collection Time: 03/13/23 12:23 AM   Result Value Ref Range    WBC 5.5 4.0 - 10.5 K/CU MM    RBC 2.75 (L) 4.2 - 5.4 M/CU MM    Hemoglobin 7.3 (L) 12.5 - 16.0 GM/DL    Hematocrit 26.7 (L) 37 - 47 %    MCV 97.1 78 - 100 FL    MCH 26.5 (L) 27 - 31 PG    MCHC 27.3 (L) 32.0 - 36.0 %    RDW 15.3 (H) 11.7 - 14.9 %    Platelets 503 531 - 646 K/CU MM    MPV 10.6 7.5 - 11.1 FL    Differential Type AUTOMATED DIFFERENTIAL     Segs Relative 61.4 36 - 66 %    Lymphocytes % 22.5 (L) 24 - 44 %    Monocytes % 8.9 (H) 0 - 4 %    Eosinophils % 6.2 (H) 0 - 3 %    Basophils % 0.5 0 - 1 %    Segs Absolute 3.4 K/CU MM    Lymphocytes Absolute 1.2 K/CU MM    Monocytes Absolute 0.5 K/CU MM    Eosinophils Absolute 0.3 K/CU MM    Basophils Absolute 0.0 K/CU MM    Nucleated RBC % 0.0 %    Total Nucleated RBC 0.0 K/CU MM    Total Immature Neutrophil 0.03 K/CU MM    Immature Neutrophil % 0.5 (H) 0 - 0.43 %   POCT Glucose    Collection Time: 03/13/23  8:19 AM   Result Value Ref Range    POC Glucose 146 (H) 70 - 99 MG/DL        Imaging/Diagnostics Last 24 Hours   XR CHEST PORTABLE    Result Date: 3/11/2023  EXAMINATION: ONE XRAY VIEW OF THE CHEST 3/11/2023 7:31 pm COMPARISON: 02/13/2023 and prior HISTORY: ORDERING SYSTEM PROVIDED HISTORY: resp failure with hypoxemia TECHNOLOGIST PROVIDED HISTORY: Reason for exam:->resp failure with hypoxemia Reason for Exam: resp failure with hypoxemia Additional signs and symptoms: resp failure with hypoxemia FINDINGS: Unchanged mild-to-moderate cardiomegaly. Unchanged small right and probable small left pleural effusions with patchy opacities overlying the bilateral middle and lower lung zones.   Vascular congestion with Kerley B lines. No pneumothorax. No acute osseous abnormality. Mild to moderate cardiomegaly with mild interstitial edema and small bilateral pleural effusions, findings which can be seen with congestive heart failure. Patchy opacities overlying the bilateral middle and lower lung zones could represent alveolar edema with underlying infection not excluded.        Electronically signed by Kadi Soria MD on 3/13/2023 at 10:16 AM

## 2023-03-14 ENCOUNTER — ANESTHESIA EVENT (OUTPATIENT)
Dept: ENDOSCOPY | Age: 58
DRG: 280 | End: 2023-03-14
Payer: MEDICARE

## 2023-03-14 LAB
ANION GAP SERPL CALCULATED.3IONS-SCNC: 4 MMOL/L (ref 4–16)
ANION GAP SERPL CALCULATED.3IONS-SCNC: 5 MMOL/L (ref 4–16)
BASOPHILS ABSOLUTE: 0 K/CU MM
BASOPHILS RELATIVE PERCENT: 0.5 % (ref 0–1)
BUN SERPL-MCNC: 19 MG/DL (ref 6–23)
BUN SERPL-MCNC: 20 MG/DL (ref 6–23)
CALCIUM SERPL-MCNC: 7.9 MG/DL (ref 8.3–10.6)
CALCIUM SERPL-MCNC: 8.1 MG/DL (ref 8.3–10.6)
CHLORIDE BLD-SCNC: 95 MMOL/L (ref 99–110)
CHLORIDE BLD-SCNC: 97 MMOL/L (ref 99–110)
CO2: 40 MMOL/L (ref 21–32)
CO2: 41 MMOL/L (ref 21–32)
CREAT SERPL-MCNC: 1.1 MG/DL (ref 0.6–1.1)
CREAT SERPL-MCNC: 1.2 MG/DL (ref 0.6–1.1)
DIFFERENTIAL TYPE: ABNORMAL
EOSINOPHILS ABSOLUTE: 0.3 K/CU MM
EOSINOPHILS RELATIVE PERCENT: 5 % (ref 0–3)
GFR SERPL CREATININE-BSD FRML MDRD: 53 ML/MIN/1.73M2
GFR SERPL CREATININE-BSD FRML MDRD: 59 ML/MIN/1.73M2
GLUCOSE BLD-MCNC: 149 MG/DL (ref 70–99)
GLUCOSE BLD-MCNC: 191 MG/DL (ref 70–99)
GLUCOSE BLD-MCNC: 196 MG/DL (ref 70–99)
GLUCOSE BLD-MCNC: 233 MG/DL (ref 70–99)
GLUCOSE SERPL-MCNC: 138 MG/DL (ref 70–99)
GLUCOSE SERPL-MCNC: 194 MG/DL (ref 70–99)
HCT VFR BLD CALC: 27.3 % (ref 37–47)
HEMOGLOBIN: 7.9 GM/DL (ref 12.5–16)
IMMATURE NEUTROPHIL %: 0.7 % (ref 0–0.43)
LYMPHOCYTES ABSOLUTE: 1.1 K/CU MM
LYMPHOCYTES RELATIVE PERCENT: 20.3 % (ref 24–44)
MAGNESIUM: 1.6 MG/DL (ref 1.8–2.4)
MAGNESIUM: 1.7 MG/DL (ref 1.8–2.4)
MCH RBC QN AUTO: 26.9 PG (ref 27–31)
MCHC RBC AUTO-ENTMCNC: 28.9 % (ref 32–36)
MCV RBC AUTO: 92.9 FL (ref 78–100)
MONOCYTES ABSOLUTE: 0.5 K/CU MM
MONOCYTES RELATIVE PERCENT: 8.3 % (ref 0–4)
NUCLEATED RBC %: 0 %
PDW BLD-RTO: 15.2 % (ref 11.7–14.9)
PHOSPHORUS: 2.8 MG/DL (ref 2.5–4.9)
PLATELET # BLD: 177 K/CU MM (ref 140–440)
PMV BLD AUTO: 10.2 FL (ref 7.5–11.1)
POTASSIUM SERPL-SCNC: 3.5 MMOL/L (ref 3.5–5.1)
POTASSIUM SERPL-SCNC: 3.7 MMOL/L (ref 3.5–5.1)
PRO-BNP: 4969 PG/ML
RBC # BLD: 2.94 M/CU MM (ref 4.2–5.4)
SEGMENTED NEUTROPHILS ABSOLUTE COUNT: 3.6 K/CU MM
SEGMENTED NEUTROPHILS RELATIVE PERCENT: 65.2 % (ref 36–66)
SODIUM BLD-SCNC: 140 MMOL/L (ref 135–145)
SODIUM BLD-SCNC: 142 MMOL/L (ref 135–145)
TOTAL IMMATURE NEUTOROPHIL: 0.04 K/CU MM
TOTAL NUCLEATED RBC: 0 K/CU MM
WBC # BLD: 5.6 K/CU MM (ref 4–10.5)

## 2023-03-14 PROCEDURE — 84100 ASSAY OF PHOSPHORUS: CPT

## 2023-03-14 PROCEDURE — 80048 BASIC METABOLIC PNL TOTAL CA: CPT

## 2023-03-14 PROCEDURE — 97530 THERAPEUTIC ACTIVITIES: CPT

## 2023-03-14 PROCEDURE — 82962 GLUCOSE BLOOD TEST: CPT

## 2023-03-14 PROCEDURE — 83735 ASSAY OF MAGNESIUM: CPT

## 2023-03-14 PROCEDURE — 97535 SELF CARE MNGMENT TRAINING: CPT

## 2023-03-14 PROCEDURE — 6360000002 HC RX W HCPCS: Performed by: INTERNAL MEDICINE

## 2023-03-14 PROCEDURE — 1200000000 HC SEMI PRIVATE

## 2023-03-14 PROCEDURE — 94761 N-INVAS EAR/PLS OXIMETRY MLT: CPT

## 2023-03-14 PROCEDURE — 85018 HEMOGLOBIN: CPT

## 2023-03-14 PROCEDURE — 6370000000 HC RX 637 (ALT 250 FOR IP): Performed by: INTERNAL MEDICINE

## 2023-03-14 PROCEDURE — 2580000003 HC RX 258: Performed by: INTERNAL MEDICINE

## 2023-03-14 PROCEDURE — 85014 HEMATOCRIT: CPT

## 2023-03-14 PROCEDURE — APPNB60 APP NON BILLABLE TIME 46-60 MINS: Performed by: NURSE PRACTITIONER

## 2023-03-14 PROCEDURE — 85025 COMPLETE CBC W/AUTO DIFF WBC: CPT

## 2023-03-14 PROCEDURE — 83880 ASSAY OF NATRIURETIC PEPTIDE: CPT

## 2023-03-14 PROCEDURE — 6360000002 HC RX W HCPCS: Performed by: NURSE PRACTITIONER

## 2023-03-14 PROCEDURE — 99232 SBSQ HOSP IP/OBS MODERATE 35: CPT | Performed by: INTERNAL MEDICINE

## 2023-03-14 PROCEDURE — 6370000000 HC RX 637 (ALT 250 FOR IP): Performed by: STUDENT IN AN ORGANIZED HEALTH CARE EDUCATION/TRAINING PROGRAM

## 2023-03-14 RX ORDER — BUMETANIDE 1 MG/1
1 TABLET ORAL DAILY
Status: DISCONTINUED | OUTPATIENT
Start: 2023-03-14 | End: 2023-03-16 | Stop reason: HOSPADM

## 2023-03-14 RX ORDER — MAGNESIUM SULFATE IN WATER 40 MG/ML
2000 INJECTION, SOLUTION INTRAVENOUS ONCE
Status: COMPLETED | OUTPATIENT
Start: 2023-03-14 | End: 2023-03-14

## 2023-03-14 RX ADMIN — SODIUM CHLORIDE, PRESERVATIVE FREE 10 ML: 5 INJECTION INTRAVENOUS at 09:24

## 2023-03-14 RX ADMIN — INSULIN GLARGINE 30 UNITS: 100 INJECTION, SOLUTION SUBCUTANEOUS at 21:03

## 2023-03-14 RX ADMIN — DICLOXACILLIN SODIUM 500 MG: 250 CAPSULE ORAL at 12:53

## 2023-03-14 RX ADMIN — INSULIN LISPRO 10 UNITS: 100 INJECTION, SOLUTION INTRAVENOUS; SUBCUTANEOUS at 09:22

## 2023-03-14 RX ADMIN — DICLOXACILLIN SODIUM 500 MG: 250 CAPSULE ORAL at 17:44

## 2023-03-14 RX ADMIN — INSULIN LISPRO 10 UNITS: 100 INJECTION, SOLUTION INTRAVENOUS; SUBCUTANEOUS at 17:43

## 2023-03-14 RX ADMIN — DICLOXACILLIN SODIUM 500 MG: 250 CAPSULE ORAL at 06:05

## 2023-03-14 RX ADMIN — ATORVASTATIN CALCIUM 20 MG: 10 TABLET, FILM COATED ORAL at 21:04

## 2023-03-14 RX ADMIN — CARVEDILOL 6.25 MG: 6.25 TABLET, FILM COATED ORAL at 09:23

## 2023-03-14 RX ADMIN — BUMETANIDE 1 MG: 1 TABLET ORAL at 09:24

## 2023-03-14 RX ADMIN — DICLOXACILLIN SODIUM 500 MG: 250 CAPSULE ORAL at 00:22

## 2023-03-14 RX ADMIN — ENOXAPARIN SODIUM 30 MG: 100 INJECTION SUBCUTANEOUS at 21:07

## 2023-03-14 RX ADMIN — ENOXAPARIN SODIUM 30 MG: 100 INJECTION SUBCUTANEOUS at 09:24

## 2023-03-14 RX ADMIN — INSULIN LISPRO 10 UNITS: 100 INJECTION, SOLUTION INTRAVENOUS; SUBCUTANEOUS at 12:53

## 2023-03-14 RX ADMIN — CARVEDILOL 6.25 MG: 6.25 TABLET, FILM COATED ORAL at 17:44

## 2023-03-14 RX ADMIN — CYANOCOBALAMIN TAB 1000 MCG 1000 MCG: 1000 TAB at 09:24

## 2023-03-14 RX ADMIN — MAGNESIUM SULFATE HEPTAHYDRATE 2000 MG: 2 INJECTION, SOLUTION INTRAVENOUS at 09:31

## 2023-03-14 NOTE — PROGRESS NOTES
Cardiology Progress Note     Today's Plan sign off  Will be available if needed     Admit Date:  3/11/2023    Consult reason/ Seen today for: CHF    Subjective and  Overnight Events:  reports she is feeling better today - shortness of breath has improved       Telemetry: SR    Assessment / Plan: 1. Acute on chronic HFpEF  Clinically improving - shortness of breath improved: oxygen demand decreased- negative fluid balance of 3.7 L :change IV Bumex to po   Echo completed: EF 60 % grade II DD -dilated IVC    2. Elevated troponin  Peak 0.062- demand leak in the setting of decompensated HF/ anemia  denies chest pain: can pursue stress test in the outpatient setting     3. HTN  Stable: on coreg    4. Hypo-magnesium  Mag 1.6- will replace     5 Acute on chronic anemia   H/h stable     History of Presenting Illness:    Chief complain on admission : 62 y. o.year old who is admitted for  Chief Complaint   Patient presents with    Shortness of Breath     Recently discharged from an ECF. States that upon arriving home pt started to have SOB w/ Wheezing. 2 Albuterol, 1 Atrovent given by EMS with positive effect. Past medical history:    has a past medical history of Sepsis ([facility] [address] Past surgical history:   has no past surgical history on file. Social History:   reports that she has never smoked. She has never used smokeless tobacco. She reports that she does not currently use alcohol. She reports that she does not currently use drugs. Family history:  family history is not on file.     No Known Allergies    Review of Systems:   All 14 systems were reviewed and are negative  Except for the positive findings which are documented     /68   Pulse 65   Temp 97.8 °F (36.6 °C) (Oral)   Resp 18   Ht 5' 5\" (1.651 m)   Wt (!) 352 lb (159.7 kg)   LMP  (LMP Unknown)   SpO2 95%   BMI 58.58 kg/m²     Intake/Output Summary (Last 24 hours) at 3/14/2023 0843  Last data filed at 3/14/2023 0645  Gross per 24 hour   Intake 749 ml   Output 800 ml   Net -51 ml       Physical Exam:  Physical Exam  Vitals reviewed. HENT:      Mouth/Throat:      Mouth: Mucous membranes are moist.   Eyes:      Extraocular Movements: Extraocular movements intact. Cardiovascular:      Rate and Rhythm: Normal rate and regular rhythm. Heart sounds: Normal heart sounds. No murmur heard. Pulmonary:      Effort: Pulmonary effort is normal.      Breath sounds: Normal breath sounds. Abdominal:      General: There is no distension. Tenderness: There is no abdominal tenderness. Musculoskeletal:      Right lower leg: Edema (2+) present. Left lower leg: Edema (2+) present. Skin:     General: Skin is warm and dry. Capillary Refill: Capillary refill takes less than 2 seconds. Neurological:      Mental Status: She is alert and oriented to person, place, and time.    Psychiatric:         Mood and Affect: Mood normal.        Medications:    bumetanide  1 mg Oral Daily    carvedilol  6.25 mg Oral BID WC    insulin lispro  0-4 Units SubCUTAneous TID WC    insulin lispro  0-4 Units SubCUTAneous Nightly    atorvastatin  20 mg Oral QPM    sodium chloride flush  5-40 mL IntraVENous 2 times per day    enoxaparin  30 mg SubCUTAneous BID    insulin glargine  30 Units SubCUTAneous QPM    insulin lispro  10 Units SubCUTAneous TID WC    cyanocobalamin  1,000 mcg Oral Daily    dicloxacillin  500 mg Oral 4x Daily      sodium chloride      dextrose      sodium chloride       sodium chloride, glucose, dextrose bolus **OR** dextrose bolus, glucagon (rDNA), dextrose, sodium chloride flush, sodium chloride, ondansetron **OR** ondansetron, polyethylene glycol, acetaminophen **OR** acetaminophen, potassium chloride **OR** potassium alternative oral replacement **OR** potassium chloride, magnesium sulfate    Lab Data:  CBC:   Recent Labs     03/12/23  0414 03/13/23  0023 03/14/23  0215 WBC 6.2 5.5 5.6   HGB 7.5* 7.3* 7.9*   HCT 27.3* 26.7* 27.3*   MCV 96.1 97.1 92.9    177 177     BMP:   Recent Labs     03/12/23  0414 03/13/23  0023 03/14/23  0215    143 142   K 4.0 3.9 3.5    100 97*   CO2 36* 36* 40*   PHOS 2.7 3.4 2.8   BUN 18 18 20   CREATININE 1.0 1.0 1.1     PT/INR: No results for input(s): PROTIME, INR in the last 72 hours. BNP:    Recent Labs     03/11/23 1920 03/14/23  0215   PROBNP 8,871* 4,969*     TROPONIN:   Recent Labs     03/11/23 1920 03/12/23 0414   TROPONINT 0.060* 0.062*              Impression:  Principal Problem:    Acute on chronic heart failure with preserved ejection fraction (HFpEF) (HCC)  Active Problems:    Acute pulmonary edema (HCC)  Resolved Problems:    * No resolved hospital problems. *       All labs, medications and tests reviewed by myself, continue all other medications of all above medical condition listed as is except for changes mentioned above. Thank you   Please call with questions. Electronically signed by MASON Shelley CNP on 3/14/2023 at 8:43 AM     Naina [address]:    Acute HFpEF: As above continue with diuretics and switch to orals. She will need regular weight monitoring upon discharge to assess for reaccumulation of fluid. HPI:  I have reviewed the HPI  And agree with above   Monica Chaudhari is a 62 y. o.year old who and presents with had concerns including Shortness of Breath (Recently discharged from an ECF. States that upon arriving home pt started to have SOB w/ Wheezing. 2 Albuterol, 1 Atrovent given by EMS with positive effect. ). Chief Complaint   Patient presents with    Shortness of Breath     Recently discharged from an ECF. States that upon arriving home pt started to have SOB w/ Wheezing. 2 Albuterol, 1 Atrovent given by EMS with positive effect. Please review addendum/changes made to note above   Interval history: Clinically improving.     I agree with the plan, which was planned by myself and discussed with advanced level provider. My documented MDM is a substantive portion of the supervisory note. I have seen ,spoken to  and examined this patient personally, independently of the advanced level provider. I have spent substantiate  portion of this encounter independently myself in examining patient and developing the medical management plan . I have reviewed the hospital care given to date and reviewed all pertinent labs and imaging. The plan was developed mutually at the time of the visit with the patient,  NP /PA  and myself. I have spoken with patient, nursing staff and provided written and verbal instructions . The above note has been reviewed and I agree with the assessment, diagnosis, and treatment plan with changes made by me as follows .     Kamari Gallegos MD

## 2023-03-14 NOTE — CONSULTS
[facility]             Tobi Kellogg, MD Lacey Davis, APRN-CNP             [address] [facility] [address], [address] [facility]             [phone] fax [phone]           Reason for Consult:  anemia    Primary Care Physician:  No primary care provider on file. History Obtained From:  patient    CC: dysspna    HISTORY OF PRESENT ILLNESS:              The patient is a 62 y.o.  female with a history of IDDM, Class III Obesity, HFpEF, recent MSSA bacteremia with Endophthalmitis Rt. eye, RAMON, rhabdo, normocytic anemia due to anemia of chronic disease and B12 deficiency, COPD on 2 L/min home oxygen, hypertension, and SAAD who was admitted on 3/11/2023 with increased CHF, elevated troponin anf Hb of 8.8 that has dropped to 7. 9. her BUN and creat are normal.her Hb was 12 a month ago but has slowly declined over the past month. There has been no gross blood in the stool   I was asked by the hospitalist to consider an EGD and cardiology has cleared her for that. Past Medical History:        Diagnosis Date    Sepsis [facility])        Past Surgical History:    History reviewed. No pertinent surgical history. Medications Prior to Admission:    Prior to Admission medications    Medication Sig Start Date End Date Taking?  Authorizing Provider   cyanocobalamin 100 MCG tablet Take 1,000 mcg by mouth daily 2/24/23  Yes Historical Provider, MD   insulin glargine-yfgn (SEMGLEE-YFGN) 100 UNIT/ML injection vial Inject 30 Units into the skin nightly 2/24/23  Yes Historical Provider, MD   insulin lispro, 1 Unit Dial, (HUMALOG/ADMELOG) 100 UNIT/ML SOPN Inject 10 Units into the skin 3 times daily (with meals)  Patient not taking: Reported on 3/11/2023 2/23/23  Yes Historical Provider, MD   carvedilol (COREG) 6.25 MG tablet Take 6.25 mg by mouth 2 times daily (with meals)    Historical Provider, MD   furosemide (LASIX) 40 MG tablet Take 40 mg by mouth daily    Historical Provider, MD   glipiZIDE (GLUCOTROL XL) 10 MG extended release tablet Take 10 mg by mouth daily    Historical Provider, MD   metFORMIN (GLUCOPHAGE) 1000 MG tablet Take 1,000 mg by mouth 2 times daily (with meals)    Historical Provider, MD       Allergies:  No Known Allergies. Social History:    TOBACCO:  No  ETOH:  No    Family History: reviewed and positives included in HPI- all other pertinent GI family history negative      REVIEW OF SYSTEMS: see HPI for positives and pertinent negatives. All other systems reviewed and are negative    PHYSICAL EXAM:    Vitals:  BP (!) 147/64   Pulse 72   Temp 98.3 °F (36.8 °C) (Oral)   Resp 17   Ht 5' 5\" (1.651 m)   Wt (!) 352 lb (159.7 kg)   LMP  (LMP Unknown)   SpO2 93%   BMI 58.58 kg/m²   CONSTITUTIONAL: alert, cooperative, no apparent distress,   EYES:  pupils equal, round and reactive to light and sclera clear  ENT:  normocepalic, without obvious abnormality  NECK:  supple, symmetrical, trachea midline  HEMATOLOGIC/LYMPHATICS:  no cervical lymphadenopathy and no supraclavicular lymphadenopathy  LUNGS:  clear to auscultation  CARDIOVASCULAR:  regular rate and rhythm and no murmur noted  ABDOMEN:  Soft, non-tender with normal bowel sounds. No organomegaly or masses  NEUROLOGIC: no focal deficit detected  SKIN:  no lesions  EXTREMITIES: no clubbing, cyanosis, ++ edema    IMPRESSION:  1) acute on chronic anemia- rule out component of occult GI bleed  2) multiple medical co-morbidities as described above    RECOMMENDATIONS:  1) EGD in am  2) will decide on colonoscopy after the above        Scotty Gong M.D

## 2023-03-14 NOTE — PROGRESS NOTES
Occupational Therapy    Occupational Therapy Treatment Note    Name: Jennifer Masters MRN: [id number] :   1965   Date:  3/14/2023   Admission Date: 3/11/2023 Room:  [facility]     Primary Problem:  Acute on chronic heart failure    Restrictions/Precautions: General Precautions, Fall Risk, 2L O2, Telemetry, Pulse Ox,BP cuff     Communication with other providers: N/A    Subjective:  Patient states: \"How far did I walk! ? My daughter will want to know! \"  Pain: Denies     Objective:    Observation: Pt supine in bed upon arrival. Pt agreeable to OT session. Objective Measures: Pt BP sitting / 64 (85); on 2L O2, pt's SpO2 88% w/ functional mobility and inc >90% w/ seated rest break, time, and PLB    Treatment, including education:  Therapeutic Activity Training:   Therapeutic activity training was instructed today. Cues were given for safety, sequence, UE/LE placement, awareness, and balance. Self Care Training:   Cues were given for safety, sequence, UE/LE placement, visual cues, and balance. Pt supine in bed upon S/OT arrival. Pt re-edu on role of OT, POC, and importance for EOB/OOB activity. Pt completed bed mobility supine to sitting EOB SBA w/ HOB elevated. Upon sitting EOB pt reported dizziness and /64 (85). With time pt dizziness subsided and pt transferred sit to stand from EOB CGA w/ inc effort and cues for safe hand placement. Pt ambulated w/ RW CGA 50 ft in hallway before fatiguing/reporting SOB and required a seated rest break. Pt transferred stand to sit to chair Min A for controlled descent. Pt edu on PLB and w/ time pt's SpO2 inc from 88% to 95%. After recovering, S/OT turned pt's chair and pt transferred sit to stand from chair Min A (x2 trials required w/ initial trial attempting CGA). Pt ambulated w/ RW CGA another 45 ft back to room. Pt noted with improved activity tolerance w/ functional mobility this date.  Pt requested to use the restroom and transferred stand to sit to BSC CGA. Pt dependent for toileting task (pt urinated/had BM while seated on BSC, assist required for elicia care and gown management). Pt transferred sit to stand from [facility] CGA. Pt required CGA while standing at RW while S/OT assisted w/ elicia care. Pt then transferred stand to sit to EOB CGA. Pt completed bed mobility sitting EOB to supine SBA w/ inc effort and x2 trials required. Pt required Min A to scoot hips up in bed (pt able to use BL UEs on head rail to assist w/ boosting self up). Pt positioned for comfort, all needs met, call light within reach, and bed alarm on.      Assessment / Impression:    Patient's tolerance of treatment: Well  Adverse Reaction: None  Significant change in status and impact: Improved from initial evaluation  Barriers to improvement: Endurance       Plan for Next Session:    Cont POC    Time in:  1615  Time out:  1653  Timed treatment minutes:  38  Total treatment time:  38      Electronically signed by:    Miguel Gutierrez, S/OT  3/14/2023, 4:36 PM    ISMAEL Jeronimo/L, [address] [facility], [phone]

## 2023-03-14 NOTE — ANESTHESIA PRE PROCEDURE
Department of Anesthesiology  Preprocedure Note       Name:  Miguelangel Galeana   Age:  62 y.o.  :  1965                                          MRN:  [id number]         Date:  3/14/2023      Surgeon: Prema De Leon):  Chalo Whitney MD    Procedure: Procedure(s):  EGD ESOPHAGOGASTRODUODENOSCOPY    Medications prior to admission:   Prior to Admission medications    Medication Sig Start Date End Date Taking?  Authorizing Provider   cyanocobalamin 100 MCG tablet Take 1,000 mcg by mouth daily 23  Yes Historical Provider, MD   insulin glargine-yfgn (SEMGLEE-YFGN) 100 UNIT/ML injection vial Inject 30 Units into the skin nightly 23  Yes Historical Provider, MD   insulin lispro, 1 Unit Dial, (HUMALOG/ADMELOG) 100 UNIT/ML SOPN Inject 10 Units into the skin 3 times daily (with meals)  Patient not taking: Reported on 3/11/2023 2/23/23  Yes Historical Provider, MD   carvedilol (COREG) 6.25 MG tablet Take 6.25 mg by mouth 2 times daily (with meals)    Historical Provider, MD   furosemide (LASIX) 40 MG tablet Take 40 mg by mouth daily    Historical Provider, MD   glipiZIDE (GLUCOTROL XL) 10 MG extended release tablet Take 10 mg by mouth daily    Historical Provider, MD   metFORMIN (GLUCOPHAGE) 1000 MG tablet Take 1,000 mg by mouth 2 times daily (with meals)    Historical Provider, MD       Current medications:    Current Facility-Administered Medications   Medication Dose Route Frequency Provider Last Rate Last Admin    bumetanide (BUMEX) tablet 1 mg  1 mg Oral Daily Bentley Zambrano MD   1 mg at 23 0924    0.9 % sodium chloride infusion   IntraVENous PRN Bentley Zambrano MD        carvedilol (COREG) tablet 6.25 mg  6.25 mg Oral BID  Tommy Lwe MD   6.25 mg at 23 0923    insulin lispro (HUMALOG) injection vial 0-4 Units  0-4 Units SubCUTAneous TID  Tommy Lew MD        insulin lispro (HUMALOG) injection vial 0-4 Units  0-4 Units SubCUTAneous Nightly Tommy Lew MD        glucose chewable tablet 16 g  4 tablet Oral PRN Tommy Hankins MD        dextrose bolus 10% 125 mL  125 mL IntraVENous PRN Tommy Hankins MD        Or    dextrose bolus 10% 250 mL  250 mL IntraVENous PRN Henry Barillas MD        glucagon (rDNA) injection 1 mg  1 mg SubCUTAneous PRN Tommy Hankins MD        dextrose 10 % infusion   IntraVENous Continuous PRN Tommy Hankins MD        atorvastatin (LIPITOR) tablet 20 mg  20 mg Oral QPM Tommy Hankins MD   20 mg at 03/13/23 2058    sodium chloride flush 0.9 % injection 5-40 mL  5-40 mL IntraVENous 2 times per day Henry Barillas MD   10 mL at 03/14/23 0924    sodium chloride flush 0.9 % injection 5-40 mL  5-40 mL IntraVENous PRN Henry Barillas MD        0.9 % sodium chloride infusion   IntraVENous PRN Henry Barillas MD        ondansetron (ZOFRAN-ODT) disintegrating tablet 4 mg  4 mg Oral Q8H PRN Tommy Hankins MD        Or    ondansetron [facility]) injection 4 mg  4 mg IntraVENous Q6H PRN Tommy Hankins MD        polyethylene glycol (GLYCOLAX) packet 17 g  17 g Oral Daily PRN Henry Barillas MD        acetaminophen (TYLENOL) tablet 650 mg  650 mg Oral Q6H PRN Tommy Hankins MD        Or    acetaminophen (TYLENOL) suppository 650 mg  650 mg Rectal Q6H PRN Tommy Hankins MD        enoxaparin Sodium (LOVENOX) injection 30 mg  30 mg SubCUTAneous BID Tommy Hankins MD   30 mg at 03/14/23 0924    potassium chloride (KLOR-CON M) extended release tablet 40 mEq  40 mEq Oral PRN Tommy Hankins MD        Or    potassium bicarb-citric acid (EFFER-K) effervescent tablet 40 mEq  40 mEq Oral PRN Tommy Hankins MD        Or    potassium chloride 10 mEq/100 mL IVPB (Peripheral Line)  10 mEq IntraVENous PRN Henry Barillas MD        magnesium sulfate 2000 mg in 50 mL IVPB premix  2,000 mg IntraVENous PRN Henry Barillas MD   Stopped at 03/13/23 1042    insulin glargine (LANTUS) injection vial 30 Units  30 Units SubCUTAneous QPM Tommy Galarza MD   30 Units at 03/13/23 2057    insulin lispro (HUMALOG) injection vial 10 Units  10 Units SubCUTAneous TID WC Tommy Galarza MD   10 Units at 03/14/23 1253    vitamin B-12 (CYANOCOBALAMIN) tablet 1,000 mcg  1,000 mcg Oral Daily Tommy Galarza MD   1,000 mcg at 03/14/23 0924    dicloxacillin (DYNAPEN) capsule 500 mg  500 mg Oral 4x Daily Tommy Galarza MD   500 mg at 03/14/23 1253       Allergies:  No Known Allergies    Problem List:    Patient Active Problem List   Diagnosis Code    Severe sepsis without septic shock ([facility] A41.9, R65.20    Accidental fall from bed W06. XXXA    Sepsis due to methicillin susceptible Staphylococcus aureus (MSSA) without acute organ dysfunction ([facility]) A41.01    Staphylococcus aureus bacteremia R78.81, B95.61    Class 3 severe obesity due to excess calories with serious comorbidity and body mass index (BMI) of 50.0 to 59.9 in adult ([facility] E66.01, Z68.43    Acute on chronic heart failure with preserved ejection fraction (HFpEF) ([facility]) I50.33    Acute pulmonary edema ([facility]) J81.0       Past Medical History:        Diagnosis Date    Sepsis [facility])        Past Surgical History:  History reviewed. No pertinent surgical history.     Social History:    Social History     Tobacco Use    Smoking status: Never    Smokeless tobacco: Never   Substance Use Topics    Alcohol use: Not Currently                                Counseling given: Not Answered      Vital Signs (Current):   Vitals:    03/13/23 2245 03/13/23 2320 03/14/23 0331 03/14/23 0923   BP: 132/66  123/68 119/64   Pulse: 72 66 65 68   Resp: 16  18 17   Temp: 36.6 °C (97.9 °F)  36.6 °C (97.8 °F) 36.6 °C (97.8 °F)   TempSrc:   Oral Oral   SpO2: 93%  95% 98%   Weight:       Height:                                                  BP Readings from Last 3 Encounters:   03/14/23 119/64   02/16/23 (!) 107/55       NPO Status: 8 hrs.                                 BMI:   Wt Readings from Last 3 Encounters:   03/13/23 (!) 352 lb (159.7 kg)   02/16/23 (!) 309 lb 11.9 oz (140.5 kg)     Body mass index is 58.58 kg/m².     CBC:   Lab Results   Component Value Date/Time    WBC 5.6 03/14/2023 02:15 AM    RBC 2.94 03/14/2023 02:15 AM    HGB 7.9 03/14/2023 02:15 AM    HCT 27.3 03/14/2023 02:15 AM    MCV 92.9 03/14/2023 02:15 AM    RDW 15.2 03/14/2023 02:15 AM     03/14/2023 02:15 AM       CMP:   Lab Results   Component Value Date/Time     03/14/2023 02:15 AM    K 3.5 03/14/2023 02:15 AM    CL 97 03/14/2023 02:15 AM    CO2 40 03/14/2023 02:15 AM    BUN 20 03/14/2023 02:15 AM    CREATININE 1.1 03/14/2023 02:15 AM    LABGLOM 59 03/14/2023 02:15 AM    GLUCOSE 138 03/14/2023 02:15 AM    PROT 7.0 03/11/2023 07:20 PM    CALCIUM 7.9 03/14/2023 02:15 AM    BILITOT 0.4 03/11/2023 07:20 PM    ALKPHOS 113 03/11/2023 07:20 PM    AST 12 03/11/2023 07:20 PM    ALT <5 03/11/2023 07:20 PM       POC Tests:   Recent Labs     03/14/23  1120   POCGLU 196*       Coags: No results found for: PROTIME, INR, APTT    HCG (If Applicable): No results found for: PREGTESTUR, PREGSERUM, HCG, HCGQUANT     ABGs: No results found for: PHART, PO2ART, TPF8FRA, EYA7CYN, BEART, B2IEUQKT     Type & Screen (If Applicable):  No results found for: LABABO, LABRH    Drug/Infectious Status (If Applicable):  No results found for: HIV, HEPCAB    COVID-19 Screening (If Applicable):   Lab Results   Component Value Date/Time    COVID19 NOT DETECTED 02/12/2023 07:25 PM           Anesthesia Evaluation  Patient summary reviewed no history of anesthetic complications:   Airway: Mallampati: II  TM distance: >3 FB   Neck ROM: full  Mouth opening: > = 3 FB   Dental:    (+) poor dentition      Pulmonary:Negative Pulmonary ROS and normal exam                              ROS comment: CXR 3/11/2023:  Impression  Mild to moderate cardiomegaly with mild interstitial edema and small  bilateral pleural effusions, findings which can be seen with congestive heart  failure.     Patchy opacities overlying the bilateral middle and lower lung zones could  represent alveolar edema with underlying infection not excluded. 2 L/min home oxygen     Cardiovascular:    (+) pulmonary hypertension:,          Beta Blocker:  Dose within 24 Hrs      ROS comment: Echo 3/2023:  Summary   TDS due to body habitus. Ejection fraction is visually estimated at 60%. RV enlargement with mild to moderate dysfucntiom   Grade II diastolic dysfunction. Sclerotic, but non-stenotic aortic valve. Moderate to severe tricuspid regurgitation; RVSP: 60 mmHg. No evidence of any pericardial effusion. Dilated Asc Ao and IVC       Neuro/Psych:   Negative Neuro/Psych ROS              GI/Hepatic/Renal:   (+) morbid obesity         ROS comment: acute on chronic anemia- rule out component of occult GI bleed. Endo/Other:    (+) DiabetesType II DM, , blood dyscrasia: anemia:., .                 Abdominal:   (+) obese,           Vascular: negative vascular ROS. Other Findings:           Anesthesia Plan      MAC     ASA 4       Induction: intravenous. Anesthetic plan and risks discussed with patient. Vicci Closs, APRN - CRNA   3/14/2023       Pre Anesthesia Evaluation complete. Anesthesia plan, risks, benefits, alternatives, and personnel discussed with patient and/or legal guardian. Patient and/or legal guardian verbalized an understanding and agreed to proceed. Anesthesia plan discussed with care team members and agreed upon.   Vicci Closs, APRN - CRNA  3/15/2023

## 2023-03-14 NOTE — PROGRESS NOTES
I have examined the patient within 24 hours  before the procedure and there is no change in the previous history and physical exam,which has been reviewed. There is a history of sleep apnea. There has been no  previous adverse experience with sedation/anesthesia. There is no increased risk for aspiration of gastric contents. The patient has been instructed that all resuscitative measures (during the operative and immediate perioperative period) will be instituted in the unlikely event that they will be needed. The patient has no pertinent past surgical or FH other than listed in the original H&P.     ASA Class: 4  AIRWAY Class: 1    Consent form signed, witnessed and in soft chart

## 2023-03-14 NOTE — PROGRESS NOTES
V2.0  [facility] [place]      Name:  Ramila Sheehan /Age/Sex: 1965  (62 y.o. female)   MRN & CSN:  [id number] & [id number] Encounter Date/Time: 3/14/2023 11:26 AM EDT    Location:  [address] [place] [address] PCP: No primary care provider on file. Hospital Day: 4    Assessment and Plan:   Ramila Sheehan, a 62 y.o. female, with a history of IDDM, Class III Obesity, HFpEF, recent MSSA bacteremia with Endophthalmitis Rt. eye, RAMON, rhabdo, normocytic anemia due to anemia of chronic disease and B12 deficiency, COPD on 2 L/min home oxygen, hypertension, SAAD was admitted on 3/11/2023. They had concerns including Shortness of Breath (Recently discharged from an [facility]. States that upon arriving home pt started to have SOB w/ Wheezing. 2 Albuterol, 1 Atrovent given by EMS with positive effect. ). Assessment  Acute on chronic decompensated HFpEF  She has a history of CHF  Presented today with shortness of breath -she was just discharged home from [facility] today after recent hospitalization at [facility]  Progressively worsening swelling in her legs -3+ bilaterally for me  Of note, she states that after she was discharged home, she went home and ate a large meatball sub. And she had not taken her Lasix on the day of admission  proBNP elevated at 8871 -which is above her last reading of 1415 on 2023  Chest x-ray with interstitial edema with bilateral small pleural effusions with what appears to be alveolar edema as well  Was given Lasix 40 mg IV x1 in the ER  Changed Iv to PO bumex 1mg BID today. Diuresing well. Echocardiogram showed grade 2 DD. Strict I's and O's     2. Normocytic anemia 2/2 anemia of chronic disease and B12 deficiency  Hemoglobin is 7.3. She was started on B12 supplements recently  GI consult placed, blood transfusion. Also, plan for EGD when stable from cardiology standpoint. Transfuse to get Hb >8     3.    NSTEMI type II 2/2 likely demand ischemia 2/2 decompensated heart failure  Troponin elevated 0.060, no chest pain     4. Lactic acidemia 2/2 likely type B 2/2 decompensated heart failure  Lactic acid is 2.5, repeat pending     5. Acute on chronic hypoxic and hypercapnic respiratory failure 2/2 above, probable SAAD/OHS, doubt infection  Patient is on baseline 2 L/min oxygen at home  Apparently, patient was found hypoxemic by EMS and was given breathing treatment and increased her nasal cannula to 4 L/min  She has no white count and her Pro-Kamron is negative  Blood cultures were sent by the emergency department  PCO2 56 on initial ABG     6. History of COPD on 2 L/min home oxygen     History of hypertension, hyperlipidemia     8. Insulin-dependent diabetes mellitus -recently started on insulin  Most recent A1c-9.5% (2/2023) despite low hemoglobin  At [facility], she was started on Lantus 30 units along with 10 units 3 times daily of Humalog with meals     9. Recent MSSA bacteremia and right eye endophthalmitis  She was recently here in [facility] and was transferred to ICU due to MSSA bacteremia with right eye endophthalmitis  She was supposed to be on oxacillin IV, however, because the patient was refusing it-this was changed to oral dicloxacillin 500 mg 4 times daily with an end date of 3/18/2023 by infectious disease at [address] [place] [address] taking dicloxacillin with end of treatment date of 3/18/2023      Recent RAMON and rhabdomyolysis     11. Class III obesity  BMI 53.25  12. Hypomagnesemia:   A. Replete as needed    Diet ADULT DIET; Regular; No Added Salt (3-4 gm)   DVT Prophylaxis [] Lovenox, []  Heparin, [] SCDs, [] Ambulation,  [] Eliquis, [] Xarelto  [] Coumadin   Code Status Full Code   Disposition From: Home  Expected Disposition: Home  Estimated Date of Discharge: 3 to 4 days  Patient requires continued admission due to needs aggressive diuresis prior to safe discharge   Surrogate Decision Maker/ POA      Subjective:     Chief Complaint: Shortness of Breath (Recently discharged from an [facility].  States that upon arriving home pt started to have SOB w/ Wheezing. 2 Albuterol, 1 Atrovent given by EMS with positive effect.)     Diuresing well. Mac Leap. Still swelling in legs. Strict Is and Os. Daily weights. Diuresing well. Changed to PO pills of bumex today. All question s answered at bedside. Will coordinate with GI and cardio for EGD plans. Review of Systems:    Review of Systems  Except above negative    Objective: Intake/Output Summary (Last 24 hours) at 3/14/2023 0943  Last data filed at 3/14/2023 0645  Gross per 24 hour   Intake 749 ml   Output 800 ml   Net -51 ml          Vitals:   Vitals:    03/14/23 0331   BP: 123/68   Pulse: 65   Resp: 18   Temp: 97.8 °F (36.6 °C)   SpO2: 95%       Physical Exam:   Physical Exam  Vitals reviewed. Constitutional:       Appearance: Normal appearance. She is obese. She is ill-appearing. HENT:      Head: Normocephalic. Right Ear: Tympanic membrane normal.      Left Ear: Tympanic membrane normal.      Nose: Nose normal.      Mouth/Throat:      Mouth: Mucous membranes are moist.   Eyes:      Extraocular Movements: Extraocular movements intact. Conjunctiva/sclera: Conjunctivae normal.      Pupils: Pupils are equal, round, and reactive to light. Cardiovascular:      Rate and Rhythm: Normal rate and regular rhythm. Pulses: Normal pulses. Heart sounds: No murmur heard. No friction rub. Pulmonary:      Effort: Respiratory distress present. Breath sounds: Rales present. No wheezing or rhonchi. Abdominal:      General: Abdomen is flat. Bowel sounds are normal. There is distension. Palpations: Abdomen is soft. Tenderness: There is no abdominal tenderness. Musculoskeletal:         General: No deformity. Normal range of motion. Cervical back: Normal range of motion and neck supple. Right lower leg: Edema present. Left lower leg: Edema present. Skin:     General: Skin is warm. Coloration: Skin is not jaundiced or pale. Neurological:      General: No focal deficit present. Mental Status: She is alert and oriented to person, place, and time. Mental status is at baseline. Motor: Weakness present.    Psychiatric:         Mood and Affect: Mood normal.         Behavior: Behavior normal.          Medications:   Medications:    bumetanide  1 mg Oral Daily    magnesium sulfate  2,000 mg IntraVENous Once    carvedilol  6.25 mg Oral BID WC    insulin lispro  0-4 Units SubCUTAneous TID WC    insulin lispro  0-4 Units SubCUTAneous Nightly    atorvastatin  20 mg Oral QPM    sodium chloride flush  5-40 mL IntraVENous 2 times per day    enoxaparin  30 mg SubCUTAneous BID    insulin glargine  30 Units SubCUTAneous QPM    insulin lispro  10 Units SubCUTAneous TID WC    cyanocobalamin  1,000 mcg Oral Daily    dicloxacillin  500 mg Oral 4x Daily      Infusions:    sodium chloride      dextrose      sodium chloride       PRN Meds: sodium chloride, , PRN  glucose, 4 tablet, PRN  dextrose bolus, 125 mL, PRN   Or  dextrose bolus, 250 mL, PRN  glucagon (rDNA), 1 mg, PRN  dextrose, , Continuous PRN  sodium chloride flush, 5-40 mL, PRN  sodium chloride, , PRN  ondansetron, 4 mg, Q8H PRN   Or  ondansetron, 4 mg, Q6H PRN  polyethylene glycol, 17 g, Daily PRN  acetaminophen, 650 mg, Q6H PRN   Or  acetaminophen, 650 mg, Q6H PRN  potassium chloride, 40 mEq, PRN   Or  potassium alternative oral replacement, 40 mEq, PRN   Or  potassium chloride, 10 mEq, PRN  magnesium sulfate, 2,000 mg, PRN      Labs      Recent Results (from the past 24 hour(s))   POCT Glucose    Collection Time: 03/13/23 11:57 AM   Result Value Ref Range    POC Glucose 170 (H) 70 - 99 MG/DL   TYPE AND SCREEN    Collection Time: 03/13/23  4:53 PM   Result Value Ref Range    ABO/Rh A POSITIVE     Antibody Screen NEGATIVE     Unit Number [id number]     Component LEUKO-POOR RED CELLS     Unit Divison 00     Status ISSUED,FINAL     Transfusion Status OK TO TRANSFUSE     Crossmatch Result COMPATIBLE    POCT Glucose    Collection Time: 03/13/23  5:37 PM   Result Value Ref Range    POC Glucose 142 (H) 70 - 99 MG/DL   POCT Glucose    Collection Time: 03/13/23  8:29 PM   Result Value Ref Range    POC Glucose 185 (H) 70 - 99 MG/DL   Basic Metabolic Panel    Collection Time: 03/14/23  2:15 AM   Result Value Ref Range    Sodium 142 135 - 145 MMOL/L    Potassium 3.5 3.5 - 5.1 MMOL/L    Chloride 97 (L) 99 - 110 mMol/L    CO2 40 (H) 21 - 32 MMOL/L    Anion Gap 5 4 - 16    BUN 20 6 - 23 MG/DL    Creatinine 1.1 0.6 - 1.1 MG/DL    Est, Glom Filt Rate 59 (L) >60 mL/min/1.73m2    Glucose 138 (H) 70 - 99 MG/DL    Calcium 7.9 (L) 8.3 - 10.6 MG/DL   Magnesium    Collection Time: 03/14/23  2:15 AM   Result Value Ref Range    Magnesium 1.6 (L) 1.8 - 2.4 mg/dl   Brain Natriuretic Peptide    Collection Time: 03/14/23  2:15 AM   Result Value Ref Range    Pro-BNP 4,969 (H) <300 PG/ML   Phosphorus    Collection Time: 03/14/23  2:15 AM   Result Value Ref Range    Phosphorus 2.8 2.5 - 4.9 MG/DL   CBC with Auto Differential    Collection Time: 03/14/23  2:15 AM   Result Value Ref Range    WBC 5.6 4.0 - 10.5 K/CU MM    RBC 2.94 (L) 4.2 - 5.4 M/CU MM    Hemoglobin 7.9 (L) 12.5 - 16.0 GM/DL    Hematocrit 27.3 (L) 37 - 47 %    MCV 92.9 78 - 100 FL    MCH 26.9 (L) 27 - 31 PG    MCHC 28.9 (L) 32.0 - 36.0 %    RDW 15.2 (H) 11.7 - 14.9 %    Platelets 970 323 - 861 K/CU MM    MPV 10.2 7.5 - 11.1 FL    Differential Type AUTOMATED DIFFERENTIAL     Segs Relative 65.2 36 - 66 %    Lymphocytes % 20.3 (L) 24 - 44 %    Monocytes % 8.3 (H) 0 - 4 %    Eosinophils % 5.0 (H) 0 - 3 %    Basophils % 0.5 0 - 1 %    Segs Absolute 3.6 K/CU MM    Lymphocytes Absolute 1.1 K/CU MM    Monocytes Absolute 0.5 K/CU MM    Eosinophils Absolute 0.3 K/CU MM    Basophils Absolute 0.0 K/CU MM    Nucleated RBC % 0.0 %    Total Nucleated RBC 0.0 K/CU MM    Total Immature Neutrophil 0.04 K/CU MM    Immature Neutrophil % 0.7 (H) 0 - 0.43 %   POCT Glucose Collection Time: 03/14/23  8:29 AM   Result Value Ref Range    POC Glucose 233 (H) 70 - 99 MG/DL        Imaging/Diagnostics Last 24 Hours   XR CHEST PORTABLE    Result Date: 3/11/2023  EXAMINATION: ONE XRAY VIEW OF THE CHEST 3/11/2023 7:31 pm COMPARISON: 02/13/2023 and prior HISTORY: ORDERING SYSTEM PROVIDED HISTORY: resp failure with hypoxemia TECHNOLOGIST PROVIDED HISTORY: Reason for exam:->resp failure with hypoxemia Reason for Exam: resp failure with hypoxemia Additional signs and symptoms: resp failure with hypoxemia FINDINGS: Unchanged mild-to-moderate cardiomegaly. Unchanged small right and probable small left pleural effusions with patchy opacities overlying the bilateral middle and lower lung zones. Vascular congestion with Kerley B lines. No pneumothorax. No acute osseous abnormality. Mild to moderate cardiomegaly with mild interstitial edema and small bilateral pleural effusions, findings which can be seen with congestive heart failure. Patchy opacities overlying the bilateral middle and lower lung zones could represent alveolar edema with underlying infection not excluded.        Electronically signed by Pranav Turner MD on 3/14/2023 at 9:43 AM

## 2023-03-15 ENCOUNTER — ANESTHESIA EVENT (OUTPATIENT)
Dept: ENDOSCOPY | Age: 58
End: 2023-03-15

## 2023-03-15 ENCOUNTER — ANESTHESIA (OUTPATIENT)
Dept: ENDOSCOPY | Age: 58
DRG: 280 | End: 2023-03-15
Payer: MEDICARE

## 2023-03-15 PROBLEM — D64.9 ANEMIA: Status: ACTIVE | Noted: 2023-03-15

## 2023-03-15 PROBLEM — R19.5 OCCULT GI BLEEDING: Status: ACTIVE | Noted: 2023-03-15

## 2023-03-15 LAB
GLUCOSE BLD-MCNC: 109 MG/DL (ref 70–99)
GLUCOSE BLD-MCNC: 113 MG/DL (ref 70–99)
GLUCOSE BLD-MCNC: 202 MG/DL (ref 70–99)
GLUCOSE BLD-MCNC: 230 MG/DL (ref 70–99)

## 2023-03-15 PROCEDURE — 82962 GLUCOSE BLOOD TEST: CPT

## 2023-03-15 PROCEDURE — 2580000003 HC RX 258: Performed by: INTERNAL MEDICINE

## 2023-03-15 PROCEDURE — 6360000002 HC RX W HCPCS: Performed by: INTERNAL MEDICINE

## 2023-03-15 PROCEDURE — 3700000001 HC ADD 15 MINUTES (ANESTHESIA): Performed by: SPECIALIST

## 2023-03-15 PROCEDURE — 2580000003 HC RX 258: Performed by: NURSE ANESTHETIST, CERTIFIED REGISTERED

## 2023-03-15 PROCEDURE — 6360000002 HC RX W HCPCS: Performed by: NURSE ANESTHETIST, CERTIFIED REGISTERED

## 2023-03-15 PROCEDURE — 6370000000 HC RX 637 (ALT 250 FOR IP): Performed by: SPECIALIST

## 2023-03-15 PROCEDURE — 3700000000 HC ANESTHESIA ATTENDED CARE: Performed by: SPECIALIST

## 2023-03-15 PROCEDURE — 3609012400 HC EGD TRANSORAL BIOPSY SINGLE/MULTIPLE: Performed by: SPECIALIST

## 2023-03-15 PROCEDURE — 87077 CULTURE AEROBIC IDENTIFY: CPT

## 2023-03-15 PROCEDURE — 2500000003 HC RX 250 WO HCPCS: Performed by: NURSE ANESTHETIST, CERTIFIED REGISTERED

## 2023-03-15 PROCEDURE — 6370000000 HC RX 637 (ALT 250 FOR IP): Performed by: INTERNAL MEDICINE

## 2023-03-15 PROCEDURE — 6370000000 HC RX 637 (ALT 250 FOR IP): Performed by: STUDENT IN AN ORGANIZED HEALTH CARE EDUCATION/TRAINING PROGRAM

## 2023-03-15 PROCEDURE — 97112 NEUROMUSCULAR REEDUCATION: CPT

## 2023-03-15 PROCEDURE — 97530 THERAPEUTIC ACTIVITIES: CPT

## 2023-03-15 PROCEDURE — 2709999900 HC NON-CHARGEABLE SUPPLY: Performed by: SPECIALIST

## 2023-03-15 PROCEDURE — 1200000000 HC SEMI PRIVATE

## 2023-03-15 PROCEDURE — 94761 N-INVAS EAR/PLS OXIMETRY MLT: CPT

## 2023-03-15 PROCEDURE — 0DB68ZX EXCISION OF STOMACH, VIA NATURAL OR ARTIFICIAL OPENING ENDOSCOPIC, DIAGNOSTIC: ICD-10-PCS | Performed by: SPECIALIST

## 2023-03-15 PROCEDURE — 97535 SELF CARE MNGMENT TRAINING: CPT

## 2023-03-15 RX ORDER — ENOXAPARIN SODIUM 100 MG/ML
40 INJECTION SUBCUTANEOUS 2 TIMES DAILY
Status: DISCONTINUED | OUTPATIENT
Start: 2023-03-15 | End: 2023-03-16 | Stop reason: HOSPADM

## 2023-03-15 RX ORDER — PROPOFOL 10 MG/ML
INJECTION, EMULSION INTRAVENOUS PRN
Status: DISCONTINUED | OUTPATIENT
Start: 2023-03-15 | End: 2023-03-15 | Stop reason: SDUPTHER

## 2023-03-15 RX ORDER — SODIUM CHLORIDE, SODIUM LACTATE, POTASSIUM CHLORIDE, CALCIUM CHLORIDE 600; 310; 30; 20 MG/100ML; MG/100ML; MG/100ML; MG/100ML
INJECTION, SOLUTION INTRAVENOUS CONTINUOUS PRN
Status: DISCONTINUED | OUTPATIENT
Start: 2023-03-15 | End: 2023-03-15 | Stop reason: SDUPTHER

## 2023-03-15 RX ORDER — LIDOCAINE HYDROCHLORIDE 20 MG/ML
INJECTION, SOLUTION EPIDURAL; INFILTRATION; INTRACAUDAL; PERINEURAL PRN
Status: DISCONTINUED | OUTPATIENT
Start: 2023-03-15 | End: 2023-03-15 | Stop reason: SDUPTHER

## 2023-03-15 RX ADMIN — LIDOCAINE HYDROCHLORIDE 100 MG: 20 INJECTION, SOLUTION EPIDURAL; INFILTRATION; INTRACAUDAL; PERINEURAL at 09:42

## 2023-03-15 RX ADMIN — ENOXAPARIN SODIUM 40 MG: 100 INJECTION SUBCUTANEOUS at 20:34

## 2023-03-15 RX ADMIN — BUMETANIDE 1 MG: 1 TABLET ORAL at 10:40

## 2023-03-15 RX ADMIN — CARVEDILOL 6.25 MG: 6.25 TABLET, FILM COATED ORAL at 17:17

## 2023-03-15 RX ADMIN — DICLOXACILLIN SODIUM 500 MG: 250 CAPSULE ORAL at 06:07

## 2023-03-15 RX ADMIN — CARVEDILOL 6.25 MG: 6.25 TABLET, FILM COATED ORAL at 10:40

## 2023-03-15 RX ADMIN — INSULIN LISPRO 1 UNITS: 100 INJECTION, SOLUTION INTRAVENOUS; SUBCUTANEOUS at 17:19

## 2023-03-15 RX ADMIN — ENOXAPARIN SODIUM 40 MG: 100 INJECTION SUBCUTANEOUS at 10:40

## 2023-03-15 RX ADMIN — ATORVASTATIN CALCIUM 20 MG: 10 TABLET, FILM COATED ORAL at 20:34

## 2023-03-15 RX ADMIN — DICLOXACILLIN SODIUM 500 MG: 250 CAPSULE ORAL at 11:47

## 2023-03-15 RX ADMIN — CYANOCOBALAMIN TAB 1000 MCG 1000 MCG: 1000 TAB at 10:40

## 2023-03-15 RX ADMIN — INSULIN LISPRO 10 UNITS: 100 INJECTION, SOLUTION INTRAVENOUS; SUBCUTANEOUS at 17:18

## 2023-03-15 RX ADMIN — SODIUM CHLORIDE, PRESERVATIVE FREE 10 ML: 5 INJECTION INTRAVENOUS at 20:34

## 2023-03-15 RX ADMIN — DICLOXACILLIN SODIUM 500 MG: 250 CAPSULE ORAL at 00:30

## 2023-03-15 RX ADMIN — DICLOXACILLIN SODIUM 500 MG: 250 CAPSULE ORAL at 17:17

## 2023-03-15 RX ADMIN — POLYETHYLENE GLYCOL 3350, SODIUM SULFATE ANHYDROUS, SODIUM BICARBONATE, SODIUM CHLORIDE, POTASSIUM CHLORIDE 2000 ML: 236; 22.74; 6.74; 5.86; 2.97 POWDER, FOR SOLUTION ORAL at 17:18

## 2023-03-15 RX ADMIN — SODIUM CHLORIDE, PRESERVATIVE FREE 10 ML: 5 INJECTION INTRAVENOUS at 10:43

## 2023-03-15 RX ADMIN — ONDANSETRON 4 MG: 4 TABLET, ORALLY DISINTEGRATING ORAL at 13:33

## 2023-03-15 RX ADMIN — SODIUM CHLORIDE, POTASSIUM CHLORIDE, SODIUM LACTATE AND CALCIUM CHLORIDE: 600; 310; 30; 20 INJECTION, SOLUTION INTRAVENOUS at 09:22

## 2023-03-15 RX ADMIN — INSULIN GLARGINE 30 UNITS: 100 INJECTION, SOLUTION SUBCUTANEOUS at 20:40

## 2023-03-15 RX ADMIN — PROPOFOL 100 MG: 10 INJECTION, EMULSION INTRAVENOUS at 09:42

## 2023-03-15 ASSESSMENT — PAIN SCALES - GENERAL: PAINLEVEL_OUTOF10: 0

## 2023-03-15 ASSESSMENT — PAIN - FUNCTIONAL ASSESSMENT: PAIN_FUNCTIONAL_ASSESSMENT: NONE - DENIES PAIN

## 2023-03-15 NOTE — PROGRESS NOTES
V2.0  [facility] Hospitalist Progress Note      Name:  Angeles Gutiérrez /Age/Sex: 1965  (62 y.o. female)   MRN & CSN:  [id number] & [id number] Encounter Date/Time: 3/15/2023 11:26 AM EDT    Location:  7903/4241-V PCP: No primary care provider on file. Hospital Day: 5    Assessment and Plan:   Angeles Gutiérrez, a 62 y.o. female, with a history of IDDM, Class III Obesity, HFpEF, recent MSSA bacteremia with Endophthalmitis Rt. eye, RAMON, rhabdo, normocytic anemia due to anemia of chronic disease and B12 deficiency, COPD on 2 L/min home oxygen, hypertension, SAAD was admitted on 3/11/2023. They had concerns including Shortness of Breath (Recently discharged from an F. States that upon arriving home pt started to have SOB w/ Wheezing. 2 Albuterol, 1 Atrovent given by EMS with positive effect. ). Assessment  Acute on chronic decompensated HFpEF  She has a history of CHF  Presented today with shortness of breath -she was just discharged home from [facility] today after recent hospitalization at [facility]  Progressively worsening swelling in her legs -3+ bilaterally for me  Of note, she states that after she was discharged home, she went home and ate a large meatball sub. And she had not taken her Lasix on the day of admission  proBNP elevated at 8871 -which is above her last reading of 1415 on 2023  Chest x-ray with interstitial edema with bilateral small pleural effusions with what appears to be alveolar edema as well  Was given Lasix 40 mg IV x1 in the ER  Changed Iv to PO bumex 1mg BID today. Diuresing well. Echocardiogram showed grade 2 DD. Strict I's and O's  Pending Swing bed placement     2. Normocytic anemia 2/2 anemia of chronic disease and B12 deficiency  Hemoglobin was 7.3  She was started on B12 supplements recently  GI consult placed, blood transfusion. Also, EGD normal Bx taken from antrum. Plan for Colonoscopy tomorrow. NPO over midnight  Transfuse to get Hb >8     3.    NSTEMI type II 2/2 likely demand ischemia 2/2 decompensated heart failure  Troponin elevated 0.060 on admission, no chest pain     4. Lactic acidemia 2/2 likely type B 2/2 decompensated heart failure  Lactic acid is 2.5, repeat pending     5. Acute on chronic hypoxic and hypercapnic respiratory failure 2/2 above, probable SAAD/OHS, doubt infection  Patient is on baseline 2 L/min oxygen at home  Apparently, patient was found hypoxemic by EMS and was given breathing treatment and increased her nasal cannula to 4 L/min  She has no white count and her Pro-Kamron is negative  Blood cultures were sent by the emergency department  PCO2 56 on initial ABG     6. History of COPD on 2 L/min home oxygen     History of hypertension, hyperlipidemia     8. Insulin-dependent diabetes mellitus -recently started on insulin  Most recent A1c-9.5% (2/2023) despite low hemoglobin  At [facility], she was started on Lantus 30 units along with 10 units 3 times daily of Humalog with meals     9. Recent MSSA bacteremia and right eye endophthalmitis  She was recently here in [place] and was transferred to ICU due to MSSA bacteremia with right eye endophthalmitis  She was supposed to be on oxacillin IV, however, because the patient was refusing it-this was changed to oral dicloxacillin 500 mg 4 times daily with an end date of 3/18/2023 by infectious disease at [address] [place] [address] taking dicloxacillin with end of treatment date of 3/18/2023    Recent RAMON and rhabdomyolysis  11. Class III obesity  BMI 53.25  12. Hypomagnesemia:   A. Replete as needed    Diet Diet NPO  Diet NPO  ADULT DIET; Regular; 5 carb choices (75 gm/meal);  Low Sodium (2 gm)   DVT Prophylaxis [] Lovenox, []  Heparin, [] SCDs, [] Ambulation,  [] Eliquis, [] Xarelto  [] Coumadin   Code Status Full Code   Disposition From: Home  Expected Disposition: Home  Estimated Date of Discharge: pending swing bed  Patient requires continued admission due to needs aggressive diuresis prior to safe discharge, likely tomorrow   Surrogate Decision Maker/ POA      Subjective:     Chief Complaint: Shortness of Breath (Recently discharged from an [facility]. States that upon arriving home pt started to have SOB w/ Wheezing. 2 Albuterol, 1 Atrovent given by EMS with positive effect.)     Diuresing well. NAEON. Still swelling in legs. Strict Is and Os.NAEON.   Review of Systems:    Review of Systems  Except above negative    Objective:     Intake/Output Summary (Last 24 hours) at 3/15/2023 1145  Last data filed at 3/15/2023 0957  Gross per 24 hour   Intake 150 ml   Output --   Net 150 ml          Vitals:   Vitals:    03/15/23 1107   BP:    Pulse:    Resp:    Temp:    SpO2: 93%       Physical Exam:   Physical Exam  Vitals reviewed.   Constitutional:       Appearance: Normal appearance. She is obese. She is ill-appearing.   HENT:      Head: Normocephalic.      Right Ear: Tympanic membrane normal.      Left Ear: Tympanic membrane normal.      Nose: Nose normal.      Mouth/Throat:      Mouth: Mucous membranes are moist.   Eyes:      Extraocular Movements: Extraocular movements intact.      Conjunctiva/sclera: Conjunctivae normal.      Pupils: Pupils are equal, round, and reactive to light.   Cardiovascular:      Rate and Rhythm: Normal rate and regular rhythm.      Pulses: Normal pulses.      Heart sounds: No murmur heard.    No friction rub.   Pulmonary:      Effort: Respiratory distress present.      Breath sounds: Rales present. No wheezing or rhonchi.   Abdominal:      General: Abdomen is flat. Bowel sounds are normal. There is distension.      Palpations: Abdomen is soft.      Tenderness: There is no abdominal tenderness.   Musculoskeletal:         General: No deformity. Normal range of motion.      Cervical back: Normal range of motion and neck supple.      Right lower leg: Edema present.      Left lower leg: Edema present.   Skin:     General: Skin is warm.      Coloration: Skin is not jaundiced or pale.   Neurological:      General: No focal  deficit present. Mental Status: She is alert and oriented to person, place, and time. Mental status is at baseline. Motor: Weakness present.    Psychiatric:         Mood and Affect: Mood normal.         Behavior: Behavior normal.          Medications:   Medications:    enoxaparin  40 mg SubCUTAneous BID    polyethylene glycol  2,000 mL Oral Once    [START ON 3/16/2023] polyethylene glycol  2,000 mL Oral Once    bumetanide  1 mg Oral Daily    carvedilol  6.25 mg Oral BID WC    insulin lispro  0-4 Units SubCUTAneous TID WC    insulin lispro  0-4 Units SubCUTAneous Nightly    atorvastatin  20 mg Oral QPM    sodium chloride flush  5-40 mL IntraVENous 2 times per day    insulin glargine  30 Units SubCUTAneous QPM    insulin lispro  10 Units SubCUTAneous TID WC    cyanocobalamin  1,000 mcg Oral Daily    dicloxacillin  500 mg Oral 4x Daily      Infusions:    sodium chloride      dextrose      sodium chloride       PRN Meds: sodium chloride, , PRN  glucose, 4 tablet, PRN  dextrose bolus, 125 mL, PRN   Or  dextrose bolus, 250 mL, PRN  glucagon (rDNA), 1 mg, PRN  dextrose, , Continuous PRN  sodium chloride flush, 5-40 mL, PRN  sodium chloride, , PRN  ondansetron, 4 mg, Q8H PRN   Or  ondansetron, 4 mg, Q6H PRN  polyethylene glycol, 17 g, Daily PRN  acetaminophen, 650 mg, Q6H PRN   Or  acetaminophen, 650 mg, Q6H PRN  potassium chloride, 40 mEq, PRN   Or  potassium alternative oral replacement, 40 mEq, PRN   Or  potassium chloride, 10 mEq, PRN  magnesium sulfate, 2,000 mg, PRN      Labs      Recent Results (from the past 24 hour(s))   POCT Glucose    Collection Time: 03/14/23  4:57 PM   Result Value Ref Range    POC Glucose 149 (H) 70 - 99 MG/DL   Basic Metabolic Panel    Collection Time: 03/14/23  6:00 PM   Result Value Ref Range    Sodium 140 135 - 145 MMOL/L    Potassium 3.7 3.5 - 5.1 MMOL/L    Chloride 95 (L) 99 - 110 mMol/L    CO2 41 (H) 21 - 32 MMOL/L    Anion Gap 4 4 - 16    BUN 19 6 - 23 MG/DL    Creatinine 1.2 (H) 0.6 - 1.1 MG/DL    Est, Glom Filt Rate 53 (L) >60 mL/min/1.73m2    Glucose 194 (H) 70 - 99 MG/DL    Calcium 8.1 (L) 8.3 - 10.6 MG/DL   Magnesium    Collection Time: 03/14/23  6:00 PM   Result Value Ref Range    Magnesium 1.7 (L) 1.8 - 2.4 mg/dl   POCT Glucose    Collection Time: 03/14/23  8:51 PM   Result Value Ref Range    POC Glucose 191 (H) 70 - 99 MG/DL   POCT Glucose    Collection Time: 03/15/23  8:29 AM   Result Value Ref Range    POC Glucose 109 (H) 70 - 99 MG/DL   POCT Glucose    Collection Time: 03/15/23 11:34 AM   Result Value Ref Range    POC Glucose 113 (H) 70 - 99 MG/DL        Imaging/Diagnostics Last 24 Hours   XR CHEST PORTABLE    Result Date: 3/11/2023  EXAMINATION: ONE XRAY VIEW OF THE CHEST 3/11/2023 7:31 pm COMPARISON: 02/13/2023 and prior HISTORY: ORDERING SYSTEM PROVIDED HISTORY: resp failure with hypoxemia TECHNOLOGIST PROVIDED HISTORY: Reason for exam:->resp failure with hypoxemia Reason for Exam: resp failure with hypoxemia Additional signs and symptoms: resp failure with hypoxemia FINDINGS: Unchanged mild-to-moderate cardiomegaly. Unchanged small right and probable small left pleural effusions with patchy opacities overlying the bilateral middle and lower lung zones. Vascular congestion with Kerley B lines. No pneumothorax. No acute osseous abnormality. Mild to moderate cardiomegaly with mild interstitial edema and small bilateral pleural effusions, findings which can be seen with congestive heart failure. Patchy opacities overlying the bilateral middle and lower lung zones could represent alveolar edema with underlying infection not excluded.        Electronically signed by Brda Wynn MD on 3/15/2023 at 11:45 AM

## 2023-03-15 NOTE — PROGRESS NOTES
LOVENOX PROPHYLAXIS EVALUATION    Wt Readings from Last 3 Encounters:   03/13/23 (!) 352 lb (159.7 kg)   02/16/23 (!) 309 lb 11.9 oz (140.5 kg)       Estimated Creatinine Clearance: 80 mL/min (A) (based on SCr of 1.2 mg/dL (H)).   Recent Labs     03/13/23  0023 03/14/23  0215 03/14/23  1800   BUN 18 20 19   CREATININE 1.0 1.1 1.2*    177  --    HGB 7.3* 7.9*  --    HCT 26.7* 27.3*  --        Weight Range: 151-174.9 kg    CRCL = 30 or greater    50.9 kg   and below     .9  kg   101-150.9 kg   151-174.9  kg   175 kg  or greater     30mg subq  daily     40mg subq daily    (or 30mg subq BID orthopedic)     30mg subq  BID   40mg subq  BID   60mg subq BID       Per P/T protocol for appropriate subq anticoagulation by weight and CRCL change to:    Enoxaparin 40mg subq BID      [address] [facility]  8:01 AM  03/15/23

## 2023-03-15 NOTE — PROGRESS NOTES
Physical Therapy  Name: Reji Sequeira MRN: [id number] :   1965   Date:  3/15/2023   Admission Date: 3/11/2023 [facility]   Restrictions/Precautions:        general precautions, falls     Communication with other providers:  Cotx with SOJOSIAH Odonnell, per patient tolerance with rehab. Jad Vera RN notified that the patient has nausea and     Subjective:  Patient states:  Patient is apprehensive for session d/t nausea. Encouragement for OOB mobility increases patient's willingness to participate. Pain:   Location, Type, Intensity (0/10 to 10/10):  denies    Objective:    Observation:  Patient supine in bed, external catheter full, patient bed saturated with urine. Rehab incorporating self care initiated. She is on 2L NC O2    Treatment, including education/measures:    Transfers with line management of Tele Monitor, NC O2  Supine to sit :Min A x 1 for BLE off EOB  Seated balance: SBA on EOB  Sit to supine :CGA for BLE into bed  Scooting :Seated scooting Min A on EOB to translates hips forward, SBA on EOB for retro scooting into bed  Sit to stand :Min A x 1 from EOB, and CGA from [facility]  Standing balance: Patient has intermittent Bue support on RW at [facility] during occupational care  Stand to sit :CGA to EOB and BSC  SPT:CGA for stand step pivot without AD from EOB to [facility] and with AD/RW from [facility] to EOB    Neuro-milly:   Patient needs verbal cues for weight shift in sitting and standing to maintain upright trunk posture at midline. She has good tolerance with seated and standing activities with no LOB    Gait:  Pt defers ambulation today d/t feeling unwell today    Exercises  Sitting Exercises:  LAQ x10    Therapeutic exercises were instructed today. Cues were given for technique, safety, recruitment, and rationale. Cues were verbal and/or tactile. Safety  Patient left safely in the bed, with call light/phone in reach with alarm applied. Gait belt and mask were used for transfers and gait.     Assessment / Impression: Patient's tolerance of treatment:  Fair   Adverse Reaction: nausea  Significant change in status and impact:  none  Barriers to improvement:  medical status    Plan for Next Session:    Will cont to work towards pt's goals per patient tolerance  Time in:  1327  Time out:  1406  Timed treatment minutes: 25  Total treatment time:  44  Previously filed items:     Short Term Goals  Time Frame for Short Term Goals: 2 weeks  Short Term Goal 1: Pt will perform sit><supine SBA  Short Term Goal 2: Pt will transfer to all surfaces SBA  Short Term Goal 3: Pt will ambulate 75ft with LRAD SBA  Short Term Goal 4: Pt will perform standing light dynamic activity x 3 minutes, single UE support if needed SBA       Electronically signed by:     Jorge Crooks PTA  3/15/2023, 2:09 PM

## 2023-03-15 NOTE — ANESTHESIA POSTPROCEDURE EVALUATION
Department of Anesthesiology  Postprocedure Note    Patient: Alejandro Guerrero  MRN: [id number]  YOB: 1965  Date of evaluation: 3/15/2023      Procedure Summary     Date: 03/15/23 Room / Location: [address]    Anesthesia Start: 9657 Anesthesia Stop: 6064    Procedure: EGD BIOPSY Diagnosis:       Anemia, unspecified type      (anemia)    Surgeons: Violetta Moran MD Responsible Provider: Dionna Crisostomo DO    Anesthesia Type: MAC ASA Status: 4          Anesthesia Type: No value filed.     Susan Phase I: Susan Score: 10    Susan Phase II:        Anesthesia Post Evaluation    Patient location during evaluation: bedside  Patient participation: complete - patient participated  Level of consciousness: awake and alert  Pain score: 0  Airway patency: patent  Nausea & Vomiting: no vomiting and no nausea  Complications: no  Cardiovascular status: blood pressure returned to baseline and hemodynamically stable  Respiratory status: acceptable, room air, spontaneous ventilation and nonlabored ventilation  Hydration status: stable

## 2023-03-15 NOTE — PROGRESS NOTES
0845: PT ALERT AND ORIENTED X 4. PRE-OP QUESTIONS ASKED AND ANSWERED APPROPRIATELY BY PT. NAME, , ARMBAND VERIFIED, PROCEDURE, ALLERGIES, IMPLANTS, LAST PO INTAKE, HISTORY, MEDS.

## 2023-03-15 NOTE — PROGRESS NOTES
Occupational Therapy    Occupational Therapy Treatment Note    Name: Michell Rascon MRN: [id number] :   1965   Date:  3/15/2023   Admission Date: 3/11/2023 Room:  [facility]     Primary Problem:  Acute on chronic heart failure     Restrictions/Precautions: General Precautions, Fall Risk, 2L O2, Telemetry, Pulse Ox, BP cuff      Communication with other providers: co-tx w/ PTA Luke Aden d/t pt's activity tolerance; RN Alejo Duckworth    Subjective:  Patient states: \"I'm not feeling too good today and feeling weak\"  Pain: Denies though pt reports nausea    Objective:    Observation: Pt supine in bed upon arrival. Pt apprehensive about co-tx d/t nausea and feeling weak. Pt educated on importance for EOB/OOB activity and w/ initial encouragement, pt agreeable to session. Pt external cath full and bed saturated in urine. RN made aware of pt's nausea and brought pt meds. Objective Measures: Stable vitals on 2L O2    Treatment, including education:  Self Care Training:   Cues were given for safety, sequence, UE/LE placement, visual cues, and balance. Therapeutic Activity Training:   Therapeutic activity training was instructed today. Cues were given for safety, sequence, UE/LE placement, awareness, and balance. Pt supine in bed upon arrival. Pt agreeable to co-tx w/ initial education and encouragement. Pt incontinent of urine and entire bed was soiled, pt agreeable to assist to clean up. Pt completed bed mobility Min A supine to sitting EOB w/ assist for BL LEs d/t LEs sticking to soiled bed. Pt demo good static sitting balance EOB. Pt dep for donning BL socks seated EOB. Pt Min A sit to stand from EOB. Pt completed stand step transfer w/o AD CGA from EOB to [facility]. Pt transferred stand to sit to [facility] CGA w/ cues for sequencing. Pt attempted to have a BM while seated on BSC, though pt unsuccessful.  While seated on BSC, pt required assist to doff soiled gown and engaged in UB bathing Min A for thoroughness and required assist to don clean gown. Pt completed facial hygiene w/ Supervision while seated on BSC. Pt dependent for LB bathing and inc time required for thoroughness. Pt transferred sit to stand from [facility] w/ RW for standing balance while S/OT assisted w/ elicia hygiene. Pt dependent for LB dressing donning new depends while standing. Pt completed stand step transfer w/ RW CGA back to EOB. Pt transferred stand to sit to EOB CGA. Pt completed bed mobility sitting EOB to supine CGA w/ inc effort. Pt positioned for comfort, all needs met, call light within reach, and bed alarm on.     Assessment / Impression:    Patient's tolerance of treatment: Good  Adverse Reaction: None  Significant change in status and impact: Similar to last session  Barriers to improvement: Endurance       Plan for Next Session:    Cont POC    Time in:  1327  Time out:  1406  Timed treatment minutes:  39  Total treatment time:  39      Electronically signed by:    Nava Kulkarni S/OT  3/15/2023, 2:10 PM       Atanacio Sandhoff, OTR/L, [address] [place] [address], .[phone]

## 2023-03-15 NOTE — ANESTHESIA PRE PROCEDURE
Department of Anesthesiology  Preprocedure Note       Name:  Ryan Lee   Age:  62 y.o.  :  1965                                          MRN:  [id number]         Date:  3/15/2023      Surgeon: Amanda Holt):  Zeus Mujica MD    Procedure: Procedure(s):  COLONOSCOPY DIAGNOSTIC    Medications prior to admission:   Prior to Admission medications    Medication Sig Start Date End Date Taking?  Authorizing Provider   cyanocobalamin 100 MCG tablet Take 1,000 mcg by mouth daily 23  Yes Historical Provider, MD   insulin glargine-yfgn (SEMGLEE-YFGN) 100 UNIT/ML injection vial Inject 30 Units into the skin nightly 23  Yes Historical Provider, MD   insulin lispro, 1 Unit Dial, (HUMALOG/ADMELOG) 100 UNIT/ML SOPN Inject 10 Units into the skin 3 times daily (with meals)  Patient not taking: Reported on 3/11/2023 2/23/23  Yes Historical Provider, MD   carvedilol (COREG) 6.25 MG tablet Take 6.25 mg by mouth 2 times daily (with meals)    Historical Provider, MD   furosemide (LASIX) 40 MG tablet Take 40 mg by mouth daily    Historical Provider, MD   glipiZIDE (GLUCOTROL XL) 10 MG extended release tablet Take 10 mg by mouth daily    Historical Provider, MD   metFORMIN (GLUCOPHAGE) 1000 MG tablet Take 1,000 mg by mouth 2 times daily (with meals)    Historical Provider, MD       Current medications:    Current Facility-Administered Medications   Medication Dose Route Frequency Provider Last Rate Last Admin    enoxaparin (LOVENOX) injection 40 mg  40 mg SubCUTAneous BID Tommy Escalante MD   40 mg at 03/15/23 1040    polyethylene glycol (GoLYTELY) solution 2,000 mL  2,000 mL Oral Once Zeus Mujica MD        [START ON 3/16/2023] polyethylene glycol (GoLYTELY) solution 2,000 mL  2,000 mL Oral Once Zeus Mujica MD        bumetanide (BUMEX) tablet 1 mg  1 mg Oral Daily Bentley Zambrano MD   1 mg at 03/15/23 1040    0.9 % sodium chloride infusion   IntraVENous PRN Bentley Zambrano MD        carvedilol (COREG) tablet 6.25 mg  6.25 mg Oral BID  Tommy Begum MD   6.25 mg at 03/15/23 1040    insulin lispro (HUMALOG) injection vial 0-4 Units  0-4 Units SubCUTAneous TID  Tommy Begum MD        insulin lispro (HUMALOG) injection vial 0-4 Units  0-4 Units SubCUTAneous Nightly Tommy Begum MD        glucose chewable tablet 16 g  4 tablet Oral PRN Tommy Begum MD        dextrose bolus 10% 125 mL  125 mL IntraVENous PRN Russell Traylor MD        Or    dextrose bolus 10% 250 mL  250 mL IntraVENous PRN Russell Traylor MD        glucagon (rDNA) injection 1 mg  1 mg SubCUTAneous PRN Tommy Begum MD        dextrose 10 % infusion   IntraVENous Continuous PRN Tommy Begum MD        atorvastatin (LIPITOR) tablet 20 mg  20 mg Oral QPM Tommy Begum MD   20 mg at 03/14/23 2104    sodium chloride flush 0.9 % injection 5-40 mL  5-40 mL IntraVENous 2 times per day Russell Traylor MD   10 mL at 03/15/23 1043    sodium chloride flush 0.9 % injection 5-40 mL  5-40 mL IntraVENous PRN Russell Traylor MD        0.9 % sodium chloride infusion   IntraVENous PRN Russell Traylor MD        ondansetron (ZOFRAN-ODT) disintegrating tablet 4 mg  4 mg Oral Q8H PRN Tommy Begum MD   4 mg at 03/15/23 1333    Or    ondansetron (ZOFRAN) injection 4 mg  4 mg IntraVENous Q6H PRN Tommy Begum MD        polyethylene glycol (GLYCOLAX) packet 17 g  17 g Oral Daily PRN Tommy Begum MD        acetaminophen (TYLENOL) tablet 650 mg  650 mg Oral Q6H PRN Tommy Begum MD        Or    acetaminophen (TYLENOL) suppository 650 mg  650 mg Rectal Q6H PRN Tommy Begum MD        potassium chloride (KLOR-CON M) extended release tablet 40 mEq  40 mEq Oral PRN Tommy Begum MD        Or    potassium bicarb-citric acid (EFFER-K) effervescent tablet 40 mEq  40 mEq Oral PRN Tommy Begum MD        Or    potassium chloride 10 mEq/100 mL IVPB (Peripheral Line)  10 mEq IntraVENous PRN Amelia Ravi MD        magnesium sulfate 2000 mg in 50 mL IVPB premix  2,000 mg IntraVENous PRN Tommy Shaffer MD   Stopped at 03/13/23 1042    insulin glargine (LANTUS) injection vial 30 Units  30 Units SubCUTAneous QPM Tommy Shaffer MD   30 Units at 03/14/23 2103    insulin lispro (HUMALOG) injection vial 10 Units  10 Units SubCUTAneous TID WC Tommy Shaffer MD   10 Units at 03/14/23 1743    vitamin B-12 (CYANOCOBALAMIN) tablet 1,000 mcg  1,000 mcg Oral Daily Tommy Shaffer MD   1,000 mcg at 03/15/23 1040    dicloxacillin (DYNAPEN) capsule 500 mg  500 mg Oral 4x Daily Tommy Shaffer MD   500 mg at 03/15/23 1147       Allergies:  No Known Allergies    Problem List:    Patient Active Problem List   Diagnosis Code    Severe sepsis without septic shock ([facility].) A41.9, R65.20    Accidental fall from bed W06. XXXA    Sepsis due to methicillin susceptible Staphylococcus aureus (MSSA) without acute organ dysfunction ([facility] A41.01    Staphylococcus aureus bacteremia R78.81, B95.61    Class 3 severe obesity due to excess calories with serious comorbidity and body mass index (BMI) of 50.0 to 59.9 in adult ([facility] E66.01, Z68.43    Acute on chronic heart failure with preserved ejection fraction (HFpEF) ([facility]) I50.33    Acute pulmonary edema (HCC) J81.0    Anemia D64.9    Occult GI bleeding R19.5       Past Medical History:        Diagnosis Date    Sepsis [facility])        Past Surgical History:  History reviewed. No pertinent surgical history.     Social History:    Social History     Tobacco Use    Smoking status: Never    Smokeless tobacco: Never   Substance Use Topics    Alcohol use: Not Currently                                Counseling given: Not Answered      Vital Signs (Current):   Vitals:    03/15/23 0822 03/15/23 1020 03/15/23 1107 03/15/23 1245   BP: 95/71 127/65  136/68   Pulse: 69 68  74   Resp: 18 16  16   Temp:  36.7 °C (98.1 °F)  36.7 °C (98 °F)   TempSrc:  Oral  Oral   SpO2: 93% 94% 93% 94%   Weight: (!) 350 lb (158.8 kg)      Height: 5' 5\" (1.651 m)                                                 BP Readings from Last 3 Encounters:   03/15/23 136/68   02/16/23 (!) 107/55       NPO Status: Time of last liquid consumption: 0600 (Sip with antbx)                        Time of last solid consumption: 1830                        Date of last liquid consumption: 03/14/23                        Date of last solid food consumption: 03/14/23    BMI:   Wt Readings from Last 3 Encounters:   03/15/23 (!) 350 lb (158.8 kg)   02/16/23 (!) 309 lb 11.9 oz (140.5 kg)     Body mass index is 58.24 kg/m².     CBC:   Lab Results   Component Value Date/Time    WBC 5.6 03/14/2023 02:15 AM    RBC 2.94 03/14/2023 02:15 AM    HGB 7.9 03/14/2023 02:15 AM    HCT 27.3 03/14/2023 02:15 AM    MCV 92.9 03/14/2023 02:15 AM    RDW 15.2 03/14/2023 02:15 AM     03/14/2023 02:15 AM       CMP:   Lab Results   Component Value Date/Time     03/14/2023 06:00 PM    K 3.7 03/14/2023 06:00 PM    CL 95 03/14/2023 06:00 PM    CO2 41 03/14/2023 06:00 PM    BUN 19 03/14/2023 06:00 PM    CREATININE 1.2 03/14/2023 06:00 PM    LABGLOM 53 03/14/2023 06:00 PM    GLUCOSE 194 03/14/2023 06:00 PM    PROT 7.0 03/11/2023 07:20 PM    CALCIUM 8.1 03/14/2023 06:00 PM    BILITOT 0.4 03/11/2023 07:20 PM    ALKPHOS 113 03/11/2023 07:20 PM    AST 12 03/11/2023 07:20 PM    ALT <5 03/11/2023 07:20 PM       POC Tests:   Recent Labs     03/15/23  1134   POCGLU 113*       Coags: No results found for: PROTIME, INR, APTT    HCG (If Applicable): No results found for: PREGTESTUR, PREGSERUM, HCG, HCGQUANT     ABGs: No results found for: PHART, PO2ART, XKX7YKZ, HPU5RLE, BEART, Q6IHJLQT     Type & Screen (If Applicable):  No results found for: LABABO, LABRH    Drug/Infectious Status (If Applicable):  No results found for: HIV, HEPCAB    COVID-19 Screening (If Applicable):   Lab Results   Component Value Date/Time    COVID19 NOT DETECTED 02/12/2023 07:25 PM           Anesthesia Evaluation  Patient summary reviewed  Airway: Mallampati: II  TM distance: >3 FB   Neck ROM: full  Mouth opening: > = 3 FB   Dental:    (+) poor dentition      Pulmonary:                             ROS comment:  CXR 3/11/2023:  Impression  Mild to moderate cardiomegaly with mild interstitial edema and small  bilateral pleural effusions, findings which can be seen with congestive heart  failure.     Patchy opacities overlying the bilateral middle and lower lung zones could  represent alveolar edema with underlying infection not excluded. 2 L/min home oxygen   Cardiovascular:    (+) pulmonary hypertension:,                ROS comment: Echo 3/2023:  Summary   TDS due to body habitus. Ejection fraction is visually estimated at 60%. RV enlargement with mild to moderate dysfucntiom   Grade II diastolic dysfunction. Sclerotic, but non-stenotic aortic valve. Moderate to severe tricuspid regurgitation; RVSP: 60 mmHg. No evidence of any pericardial effusion. Dilated Asc Ao and IVC     Neuro/Psych:   Negative Neuro/Psych ROS              GI/Hepatic/Renal:   (+) bowel prep, morbid obesity         ROS comment: acute on chronic anemia- rule out component of occult GI bleed. .   Endo/Other:    (+) DiabetesType II DM, , .                 Abdominal:   (+) obese,           Vascular: negative vascular ROS. Other Findings:           Anesthesia Plan      MAC     ASA 4       Induction: intravenous. MASON Dash - CRNA   3/15/2023       Pre Anesthesia Assessment complete.  Chart reviewed on 3/15/2023

## 2023-03-15 NOTE — BRIEF OP NOTE
BRIEF EGD REPORT:   The original EGD report with photos can be found by going to \"chart review\" then \"notes\" then \"Upper GI endoscopy\" then \"scan. Tanvir Gabriel \"     Impression:         -  Normal esophagus.         - multiple, long linear erosions in the body of the stomach         - Biopsies were taken of the gastric body and antrum to assay for H pylori by            the Jennifer-Test method         -  Normal examined duodenum.     Recommendation:         - colonoscopy tomorrow

## 2023-03-16 ENCOUNTER — ANESTHESIA (OUTPATIENT)
Dept: ENDOSCOPY | Age: 58
End: 2023-03-16

## 2023-03-16 ENCOUNTER — HOSPITAL ENCOUNTER (INPATIENT)
Age: 58
LOS: 16 days | Discharge: HOME | End: 2023-04-01
Attending: INTERNAL MEDICINE | Admitting: INTERNAL MEDICINE
Payer: MEDICARE

## 2023-03-16 VITALS
HEIGHT: 65 IN | BODY MASS INDEX: 48.82 KG/M2 | HEART RATE: 78 BPM | RESPIRATION RATE: 16 BRPM | WEIGHT: 293 LBS | OXYGEN SATURATION: 90 % | DIASTOLIC BLOOD PRESSURE: 64 MMHG | TEMPERATURE: 98.2 F | SYSTOLIC BLOOD PRESSURE: 117 MMHG

## 2023-03-16 PROBLEM — R53.81 PHYSICAL DEBILITY: Status: ACTIVE | Noted: 2023-03-16

## 2023-03-16 PROBLEM — I50.33 ACUTE ON CHRONIC HEART FAILURE WITH PRESERVED EJECTION FRACTION (HFPEF) (HCC): Status: RESOLVED | Noted: 2023-03-11 | Resolved: 2023-03-16

## 2023-03-16 PROBLEM — J81.0 ACUTE PULMONARY EDEMA (HCC): Status: RESOLVED | Noted: 2023-03-13 | Resolved: 2023-03-16

## 2023-03-16 PROBLEM — R19.5 OCCULT GI BLEEDING: Status: RESOLVED | Noted: 2023-03-15 | Resolved: 2023-03-16

## 2023-03-16 LAB
ABO/RH: NORMAL
ANION GAP SERPL CALCULATED.3IONS-SCNC: 4 MMOL/L (ref 4–16)
ANTIBODY SCREEN: NEGATIVE
BUN SERPL-MCNC: 15 MG/DL (ref 6–23)
CALCIUM SERPL-MCNC: 7.9 MG/DL (ref 8.3–10.6)
CHLORIDE BLD-SCNC: 96 MMOL/L (ref 99–110)
CO2: 44 MMOL/L (ref 21–32)
COMPONENT: NORMAL
COMPONENT: NORMAL
CREAT SERPL-MCNC: 0.9 MG/DL (ref 0.6–1.1)
CROSSMATCH RESULT: NORMAL
CROSSMATCH RESULT: NORMAL
CULTURE: NORMAL
CULTURE: NORMAL
GFR SERPL CREATININE-BSD FRML MDRD: >60 ML/MIN/1.73M2
GLUCOSE BLD-MCNC: 111 MG/DL (ref 70–99)
GLUCOSE BLD-MCNC: 190 MG/DL (ref 70–99)
GLUCOSE BLD-MCNC: 204 MG/DL (ref 70–99)
GLUCOSE BLD-MCNC: 88 MG/DL (ref 70–99)
GLUCOSE SERPL-MCNC: 116 MG/DL (ref 70–99)
HCT VFR BLD CALC: 28.5 % (ref 37–47)
HEMOGLOBIN: 8 GM/DL (ref 12.5–16)
Lab: NORMAL
Lab: NORMAL
MAGNESIUM: 1.8 MG/DL (ref 1.8–2.4)
MCH RBC QN AUTO: 26.5 PG (ref 27–31)
MCHC RBC AUTO-ENTMCNC: 28.1 % (ref 32–36)
MCV RBC AUTO: 94.4 FL (ref 78–100)
PDW BLD-RTO: 15.2 % (ref 11.7–14.9)
PLATELET # BLD: 174 K/CU MM (ref 140–440)
PMV BLD AUTO: 10.3 FL (ref 7.5–11.1)
POTASSIUM SERPL-SCNC: 3.5 MMOL/L (ref 3.5–5.1)
RBC # BLD: 3.02 M/CU MM (ref 4.2–5.4)
SARS-COV-2 RDRP RESP QL NAA+PROBE: NOT DETECTED
SODIUM BLD-SCNC: 144 MMOL/L (ref 135–145)
SOURCE: NORMAL
SPECIMEN: NORMAL
SPECIMEN: NORMAL
STATUS: NORMAL
STATUS: NORMAL
TRANSFUSION STATUS: NORMAL
TRANSFUSION STATUS: NORMAL
UNIT DIVISION: 0
UNIT DIVISION: 0
UNIT NUMBER: NORMAL
UNIT NUMBER: NORMAL
WBC # BLD: 4 K/CU MM (ref 4–10.5)

## 2023-03-16 PROCEDURE — 97116 GAIT TRAINING THERAPY: CPT

## 2023-03-16 PROCEDURE — 80048 BASIC METABOLIC PNL TOTAL CA: CPT

## 2023-03-16 PROCEDURE — 6360000002 HC RX W HCPCS: Performed by: INTERNAL MEDICINE

## 2023-03-16 PROCEDURE — 2580000003 HC RX 258: Performed by: INTERNAL MEDICINE

## 2023-03-16 PROCEDURE — 2580000003 HC RX 258: Performed by: STUDENT IN AN ORGANIZED HEALTH CARE EDUCATION/TRAINING PROGRAM

## 2023-03-16 PROCEDURE — 1200000002 HC SEMI PRIVATE SWING BED

## 2023-03-16 PROCEDURE — 6370000000 HC RX 637 (ALT 250 FOR IP): Performed by: INTERNAL MEDICINE

## 2023-03-16 PROCEDURE — 82962 GLUCOSE BLOOD TEST: CPT

## 2023-03-16 PROCEDURE — 6360000002 HC RX W HCPCS: Performed by: STUDENT IN AN ORGANIZED HEALTH CARE EDUCATION/TRAINING PROGRAM

## 2023-03-16 PROCEDURE — 36415 COLL VENOUS BLD VENIPUNCTURE: CPT

## 2023-03-16 PROCEDURE — 97530 THERAPEUTIC ACTIVITIES: CPT

## 2023-03-16 PROCEDURE — 6370000000 HC RX 637 (ALT 250 FOR IP): Performed by: STUDENT IN AN ORGANIZED HEALTH CARE EDUCATION/TRAINING PROGRAM

## 2023-03-16 PROCEDURE — 6370000000 HC RX 637 (ALT 250 FOR IP): Performed by: SPECIALIST

## 2023-03-16 PROCEDURE — 83735 ASSAY OF MAGNESIUM: CPT

## 2023-03-16 PROCEDURE — 87635 SARS-COV-2 COVID-19 AMP PRB: CPT

## 2023-03-16 PROCEDURE — 85027 COMPLETE CBC AUTOMATED: CPT

## 2023-03-16 RX ORDER — INSULIN LISPRO 100 [IU]/ML
0-4 INJECTION, SOLUTION INTRAVENOUS; SUBCUTANEOUS NIGHTLY
Status: DISCONTINUED | OUTPATIENT
Start: 2023-03-16 | End: 2023-03-17

## 2023-03-16 RX ORDER — SODIUM CHLORIDE 9 MG/ML
INJECTION, SOLUTION INTRAVENOUS PRN
Status: CANCELLED | OUTPATIENT
Start: 2023-03-16

## 2023-03-16 RX ORDER — PANTOPRAZOLE SODIUM 40 MG/1
40 TABLET, DELAYED RELEASE ORAL
Status: CANCELLED | OUTPATIENT
Start: 2023-03-17

## 2023-03-16 RX ORDER — DICLOXACILLIN SODIUM 250 MG/1
500 CAPSULE ORAL 4 TIMES DAILY
Status: DISPENSED | OUTPATIENT
Start: 2023-03-16 | End: 2023-03-18

## 2023-03-16 RX ORDER — ACETAMINOPHEN 325 MG/1
650 TABLET ORAL EVERY 6 HOURS PRN
Status: DISCONTINUED | OUTPATIENT
Start: 2023-03-16 | End: 2023-03-31

## 2023-03-16 RX ORDER — ATORVASTATIN CALCIUM 20 MG/1
20 TABLET, FILM COATED ORAL EVERY EVENING
Qty: 30 TABLET | Refills: 3 | Status: ON HOLD | OUTPATIENT
Start: 2023-03-16

## 2023-03-16 RX ORDER — PANTOPRAZOLE SODIUM 40 MG/1
40 TABLET, DELAYED RELEASE ORAL
Status: DISCONTINUED | OUTPATIENT
Start: 2023-03-17 | End: 2023-04-01 | Stop reason: HOSPADM

## 2023-03-16 RX ORDER — INSULIN LISPRO 100 [IU]/ML
10 INJECTION, SOLUTION INTRAVENOUS; SUBCUTANEOUS
Status: CANCELLED | OUTPATIENT
Start: 2023-03-16

## 2023-03-16 RX ORDER — ACETAMINOPHEN 650 MG/1
650 SUPPOSITORY RECTAL EVERY 6 HOURS PRN
Status: CANCELLED | OUTPATIENT
Start: 2023-03-16

## 2023-03-16 RX ORDER — SODIUM CHLORIDE 0.9 % (FLUSH) 0.9 %
5-40 SYRINGE (ML) INJECTION PRN
Status: DISCONTINUED | OUTPATIENT
Start: 2023-03-16 | End: 2023-03-16

## 2023-03-16 RX ORDER — ACETAMINOPHEN 650 MG/1
650 SUPPOSITORY RECTAL EVERY 6 HOURS PRN
Status: DISCONTINUED | OUTPATIENT
Start: 2023-03-16 | End: 2023-03-31

## 2023-03-16 RX ORDER — POTASSIUM CHLORIDE 7.45 MG/ML
10 INJECTION INTRAVENOUS PRN
Status: DISCONTINUED | OUTPATIENT
Start: 2023-03-16 | End: 2023-03-16

## 2023-03-16 RX ORDER — INSULIN LISPRO 100 [IU]/ML
0-4 INJECTION, SOLUTION INTRAVENOUS; SUBCUTANEOUS NIGHTLY
Status: CANCELLED | OUTPATIENT
Start: 2023-03-16

## 2023-03-16 RX ORDER — POTASSIUM CHLORIDE 20 MEQ/1
40 TABLET, EXTENDED RELEASE ORAL PRN
Status: CANCELLED | OUTPATIENT
Start: 2023-03-16

## 2023-03-16 RX ORDER — SODIUM CHLORIDE 9 MG/ML
INJECTION, SOLUTION INTRAVENOUS PRN
Status: DISCONTINUED | OUTPATIENT
Start: 2023-03-16 | End: 2023-03-16

## 2023-03-16 RX ORDER — POTASSIUM CHLORIDE 20 MEQ/1
40 TABLET, EXTENDED RELEASE ORAL PRN
Status: DISCONTINUED | OUTPATIENT
Start: 2023-03-16 | End: 2023-03-16

## 2023-03-16 RX ORDER — DEXTROSE MONOHYDRATE 100 MG/ML
INJECTION, SOLUTION INTRAVENOUS CONTINUOUS PRN
Status: CANCELLED | OUTPATIENT
Start: 2023-03-16

## 2023-03-16 RX ORDER — ONDANSETRON 2 MG/ML
4 INJECTION INTRAMUSCULAR; INTRAVENOUS EVERY 6 HOURS PRN
Status: DISCONTINUED | OUTPATIENT
Start: 2023-03-16 | End: 2023-04-01 | Stop reason: HOSPADM

## 2023-03-16 RX ORDER — BUMETANIDE 1 MG/1
1 TABLET ORAL DAILY
Status: DISCONTINUED | OUTPATIENT
Start: 2023-03-17 | End: 2023-04-01 | Stop reason: HOSPADM

## 2023-03-16 RX ORDER — PANTOPRAZOLE SODIUM 40 MG/1
40 TABLET, DELAYED RELEASE ORAL
Status: DISCONTINUED | OUTPATIENT
Start: 2023-03-16 | End: 2023-03-16 | Stop reason: HOSPADM

## 2023-03-16 RX ORDER — ENOXAPARIN SODIUM 100 MG/ML
40 INJECTION SUBCUTANEOUS 2 TIMES DAILY
Status: CANCELLED | OUTPATIENT
Start: 2023-03-16

## 2023-03-16 RX ORDER — BUMETANIDE 1 MG/1
1 TABLET ORAL DAILY
Status: CANCELLED | OUTPATIENT
Start: 2023-03-17

## 2023-03-16 RX ORDER — LANOLIN ALCOHOL/MO/W.PET/CERES
1000 CREAM (GRAM) TOPICAL DAILY
Status: DISCONTINUED | OUTPATIENT
Start: 2023-03-17 | End: 2023-04-01 | Stop reason: HOSPADM

## 2023-03-16 RX ORDER — ONDANSETRON 2 MG/ML
4 INJECTION INTRAMUSCULAR; INTRAVENOUS EVERY 6 HOURS PRN
Status: CANCELLED | OUTPATIENT
Start: 2023-03-16

## 2023-03-16 RX ORDER — SODIUM CHLORIDE 0.9 % (FLUSH) 0.9 %
5-40 SYRINGE (ML) INJECTION PRN
Status: CANCELLED | OUTPATIENT
Start: 2023-03-16

## 2023-03-16 RX ORDER — SODIUM CHLORIDE 0.9 % (FLUSH) 0.9 %
5-40 SYRINGE (ML) INJECTION EVERY 12 HOURS SCHEDULED
Status: CANCELLED | OUTPATIENT
Start: 2023-03-16

## 2023-03-16 RX ORDER — ONDANSETRON 4 MG/1
4 TABLET, ORALLY DISINTEGRATING ORAL EVERY 8 HOURS PRN
Status: CANCELLED | OUTPATIENT
Start: 2023-03-16

## 2023-03-16 RX ORDER — INSULIN GLARGINE 100 [IU]/ML
30 INJECTION, SOLUTION SUBCUTANEOUS EVERY EVENING
Status: CANCELLED | OUTPATIENT
Start: 2023-03-16

## 2023-03-16 RX ORDER — ENOXAPARIN SODIUM 100 MG/ML
40 INJECTION SUBCUTANEOUS 2 TIMES DAILY
Status: DISCONTINUED | OUTPATIENT
Start: 2023-03-16 | End: 2023-04-01 | Stop reason: HOSPADM

## 2023-03-16 RX ORDER — BUMETANIDE 1 MG/1
1 TABLET ORAL DAILY
Qty: 30 TABLET | Refills: 3 | Status: ON HOLD | OUTPATIENT
Start: 2023-03-17

## 2023-03-16 RX ORDER — ATORVASTATIN CALCIUM 10 MG/1
20 TABLET, FILM COATED ORAL EVERY EVENING
Status: CANCELLED | OUTPATIENT
Start: 2023-03-16

## 2023-03-16 RX ORDER — POTASSIUM CHLORIDE 7.45 MG/ML
10 INJECTION INTRAVENOUS PRN
Status: CANCELLED | OUTPATIENT
Start: 2023-03-16

## 2023-03-16 RX ORDER — INSULIN LISPRO 100 [IU]/ML
0-4 INJECTION, SOLUTION INTRAVENOUS; SUBCUTANEOUS
Status: DISCONTINUED | OUTPATIENT
Start: 2023-03-16 | End: 2023-03-17

## 2023-03-16 RX ORDER — INSULIN LISPRO 100 [IU]/ML
10 INJECTION, SOLUTION INTRAVENOUS; SUBCUTANEOUS
Status: DISCONTINUED | OUTPATIENT
Start: 2023-03-16 | End: 2023-03-17

## 2023-03-16 RX ORDER — SODIUM CHLORIDE 0.9 % (FLUSH) 0.9 %
5-40 SYRINGE (ML) INJECTION EVERY 12 HOURS SCHEDULED
Status: DISCONTINUED | OUTPATIENT
Start: 2023-03-16 | End: 2023-03-22

## 2023-03-16 RX ORDER — INSULIN LISPRO 100 [IU]/ML
0-4 INJECTION, SOLUTION INTRAVENOUS; SUBCUTANEOUS
Status: CANCELLED | OUTPATIENT
Start: 2023-03-16

## 2023-03-16 RX ORDER — ONDANSETRON 4 MG/1
4 TABLET, ORALLY DISINTEGRATING ORAL EVERY 8 HOURS PRN
Status: DISCONTINUED | OUTPATIENT
Start: 2023-03-16 | End: 2023-04-01 | Stop reason: HOSPADM

## 2023-03-16 RX ORDER — CARVEDILOL 6.25 MG/1
6.25 TABLET ORAL 2 TIMES DAILY WITH MEALS
Status: DISCONTINUED | OUTPATIENT
Start: 2023-03-16 | End: 2023-04-01 | Stop reason: HOSPADM

## 2023-03-16 RX ORDER — LANOLIN ALCOHOL/MO/W.PET/CERES
1000 CREAM (GRAM) TOPICAL DAILY
Status: CANCELLED | OUTPATIENT
Start: 2023-03-17

## 2023-03-16 RX ORDER — ATORVASTATIN CALCIUM 20 MG/1
20 TABLET, FILM COATED ORAL EVERY EVENING
Status: DISCONTINUED | OUTPATIENT
Start: 2023-03-16 | End: 2023-04-01 | Stop reason: HOSPADM

## 2023-03-16 RX ORDER — DEXTROSE MONOHYDRATE 100 MG/ML
INJECTION, SOLUTION INTRAVENOUS CONTINUOUS PRN
Status: DISCONTINUED | OUTPATIENT
Start: 2023-03-16 | End: 2023-03-16

## 2023-03-16 RX ORDER — DICLOXACILLIN SODIUM 250 MG/1
500 CAPSULE ORAL 4 TIMES DAILY
Status: CANCELLED | OUTPATIENT
Start: 2023-03-16 | End: 2023-03-18

## 2023-03-16 RX ORDER — MAGNESIUM SULFATE IN WATER 40 MG/ML
2000 INJECTION, SOLUTION INTRAVENOUS PRN
Status: DISCONTINUED | OUTPATIENT
Start: 2023-03-16 | End: 2023-03-16

## 2023-03-16 RX ORDER — POLYETHYLENE GLYCOL 3350 17 G/17G
17 POWDER, FOR SOLUTION ORAL DAILY PRN
Status: CANCELLED | OUTPATIENT
Start: 2023-03-16

## 2023-03-16 RX ORDER — ACETAMINOPHEN 325 MG/1
650 TABLET ORAL EVERY 6 HOURS PRN
Status: CANCELLED | OUTPATIENT
Start: 2023-03-16

## 2023-03-16 RX ORDER — POLYETHYLENE GLYCOL 3350 17 G/17G
17 POWDER, FOR SOLUTION ORAL DAILY PRN
Status: DISCONTINUED | OUTPATIENT
Start: 2023-03-16 | End: 2023-04-01 | Stop reason: HOSPADM

## 2023-03-16 RX ORDER — MAGNESIUM SULFATE IN WATER 40 MG/ML
2000 INJECTION, SOLUTION INTRAVENOUS PRN
Status: CANCELLED | OUTPATIENT
Start: 2023-03-16

## 2023-03-16 RX ORDER — CARVEDILOL 6.25 MG/1
6.25 TABLET ORAL 2 TIMES DAILY WITH MEALS
Status: CANCELLED | OUTPATIENT
Start: 2023-03-16

## 2023-03-16 RX ADMIN — ATORVASTATIN CALCIUM 20 MG: 20 TABLET, FILM COATED ORAL at 17:54

## 2023-03-16 RX ADMIN — ENOXAPARIN SODIUM 40 MG: 100 INJECTION SUBCUTANEOUS at 20:06

## 2023-03-16 RX ADMIN — PANTOPRAZOLE SODIUM 40 MG: 40 TABLET, DELAYED RELEASE ORAL at 05:46

## 2023-03-16 RX ADMIN — INSULIN GLARGINE 30 UNITS: 100 INJECTION, SOLUTION SUBCUTANEOUS at 17:55

## 2023-03-16 RX ADMIN — DICLOXACILLIN SODIUM 500 MG: 250 CAPSULE ORAL at 00:42

## 2023-03-16 RX ADMIN — CARVEDILOL 6.25 MG: 6.25 TABLET, FILM COATED ORAL at 17:54

## 2023-03-16 RX ADMIN — ENOXAPARIN SODIUM 40 MG: 100 INJECTION SUBCUTANEOUS at 09:21

## 2023-03-16 RX ADMIN — CARVEDILOL 6.25 MG: 6.25 TABLET, FILM COATED ORAL at 09:21

## 2023-03-16 RX ADMIN — DICLOXACILLIN SODIUM 500 MG: 250 CAPSULE ORAL at 05:47

## 2023-03-16 RX ADMIN — CYANOCOBALAMIN TAB 1000 MCG 1000 MCG: 1000 TAB at 09:21

## 2023-03-16 RX ADMIN — INSULIN LISPRO 10 UNITS: 100 INJECTION, SOLUTION INTRAVENOUS; SUBCUTANEOUS at 17:55

## 2023-03-16 RX ADMIN — SODIUM CHLORIDE, PRESERVATIVE FREE 10 ML: 5 INJECTION INTRAVENOUS at 09:22

## 2023-03-16 RX ADMIN — SODIUM CHLORIDE, PRESERVATIVE FREE 10 ML: 5 INJECTION INTRAVENOUS at 20:06

## 2023-03-16 RX ADMIN — BUMETANIDE 1 MG: 1 TABLET ORAL at 09:21

## 2023-03-16 ASSESSMENT — PAIN SCALES - GENERAL: PAINLEVEL_OUTOF10: 0

## 2023-03-16 NOTE — PROGRESS NOTES
4 Eyes Skin Assessment     NAME:  Jennifer Freedman  YOB: 1965  MEDICAL RECORD NUMBER:  [id number]    The patient is being assessed for  Admission    I agree that One RN has performed a thorough Head to Toe Skin Assessment on the patient. ALL assessment sites listed below have been assessed. Areas assessed by both nurses:    Head, Face, Ears, Shoulders, Back, Chest, Arms, Elbows, Hands, Sacrum. Buttock, Coccyx, Ischium, and Legs. Feet and Heels        Does the Patient have a Wound? Other redness to abd folds.        Gerson Prevention initiated by RN: Yes   Wound Care Orders initiated by RN: NA    Pressure Injury (Stage 3,4, Unstageable, DTI, NWPT, and Complex wounds) if present, place referral order by RN under : No    New and Established Ostomies, if present place, referral order under : No      Nurse 1 eSignature: Electronically signed by Marah Gonzalez RN on 3/16/23 at 6:16 PM EDT    **SHARE this note so that the co-signing nurse can place an eSignature**    Nurse 2 eSignature: Electronically signed by Aye Leonardo RN on 3/16/23 at 6:17 PM EDT

## 2023-03-16 NOTE — DISCHARGE INSTRUCTIONS
Call to schedule follow up hospital follow up appointment:    [address] at [address]  [address] [facility], 400 [facility] 2810 [facility] [phone]  [address], [facility] [phone]   [address] [place] [address]     Call for new patient Primary Care Physician appointment:     Physician Finder [phone]    Dr. Belinda Delaney and Dr. Michael Perdomo [phone] [address] [facility] [phone]  [address], [place]    Dr. Chu Bradshaw and Maura Mike [phone]  [address] [facility], [address]  [address]    Dr. Genevieve Phoenix and Katherine WEAVER [phone]  [facility], [address] [address] [facility],[address]  45 [facility], [address] [place] [address] 1411 [facility] Direct Primary Care [phone]   3651 [facility] 74*    Dr. Ed WEAVER and Ronit [facility] [phone]  269 [facility], [address] [facility], [address] [address]*) [phone]   [facility]    Dr. Filomena Wilburn [phone]  [address] [facility], 5501 [facility] 6300 [facility] [phone]  [address]    Dr. Warden Duncan [phone] [address] [facility], [address]  [address]    Dr. Belinda Delaney and Dr. Red Kyle [phone]  [address], [address] Cathy fran WEAVER and [place] [phone]  [address], [address] Bhumi Arevalo [place], [facility] and Mariella [facility] [phone]   [facility] Cathy Renteria and Stephen Balderas [phone]  [phone], Suite [facility]    Dr. Sarah Elmore, Dr. Jennifer Ro CNP, Yaakov Bergman [address] and Raymundo Mchugh [address] [facility] [address]   [phone]  [address] [place] [address] [address], [address] [place], [place]    Dr. Dougie Brock CNP and Radha Olea [phone]  [address], [address] [place] [phone]  [facility] [address], [address] [place] [address]

## 2023-03-16 NOTE — PROGRESS NOTES
03/16/23 8454   Encounter Summary   Encounter Overview/Reason  Initial Encounter   Service Provided For: Patient   Referral/Consult From: [facility]   Last Encounter  03/16/23  (Offered prayer and spiritual support. Patient was uncomfortable, called nurse to reposition her.)   Complexity of Encounter Low   Begin Time 0625   End Time  0639   Total Time Calculated 14 min   Encounter    Type Initial Screen/Assessment   Spiritual/Emotional needs   Type Spiritual Support   Assessment/Intervention/Outcome   Assessment Coping;Peaceful   Intervention Active listening;Discussed belief system/Mormonism practices/ann marie;Discussed illness injury and its impact; Discussed relationship with God;Empowerment;Nurtured Hope;Prayer (assurance of)/[place];Sustaining Presence/Ministry of presence   Outcome Concerns relieved;Engaged in conversation;Encouraged;Expressed Gratitude   Plan and Referrals   Plan/Referrals Continue to visit, (comment)  (as needed)

## 2023-03-16 NOTE — DISCHARGE INSTR - COC
Continuity of Care Form    Patient Name: Mike Pollard   :  1965  MRN:  [id number]    Admit date:  3/11/2023  Discharge date:  ***    Code Status Order: Full Code   Advance Directives:   Advance Care Flowsheet Documentation       Date/Time Healthcare Directive Type of Healthcare Directive Copy in [address] Agent's Name Healthcare Agent's Phone Number    03/15/23 5777 No, patient does not have an advance directive for healthcare treatment -- -- -- -- --    03/15/23 0636 No, patient does not have an advance directive for healthcare treatment -- -- -- -- --            Admitting Physician:  Liz Looney MD  PCP: No primary care provider on file. Discharging Nurse: [facility] Unit/Room#: 9736/3423-G  Discharging Unit Phone Number: ***    Emergency Contact:   Extended Emergency Contact Information  Primary Emergency Contact: jamie steven  Home Phone: [phone]  Relation: Child    Past Surgical History:  Past Surgical History:   Procedure Laterality Date    UPPER GASTROINTESTINAL ENDOSCOPY N/A 3/15/2023    EGD BIOPSY performed by Garrett Lee MD at [facility] ENDOSCOPY       Immunization History: There is no immunization history on file for this patient. Active Problems:  Patient Active Problem List   Diagnosis Code    Severe sepsis without septic shock ([facility] 75.) A41.9, R65.20    Accidental fall from bed W06. XXXA    Sepsis due to methicillin susceptible Staphylococcus aureus (MSSA) without acute organ dysfunction (HCC) A41.01    Staphylococcus aureus bacteremia R78.81, B95.61    Class 3 severe obesity due to excess calories with serious comorbidity and body mass index (BMI) of 50.0 to 59.9 in adult ([facility] 75.) E66.01, Z68.43    Anemia D64.9       Isolation/Infection:   Isolation            No Isolation          Patient Infection Status       Infection Onset Added Last Indicated Last Indicated By Review Planned Expiration Resolved Resolved By    None active Resolved    COVID-19 (Rule Out) 23 COVID-19, Rapid (Ordered)   23 Rule-Out Test Resulted            Nurse Assessment:  Last Vital Signs: /64   Pulse 78   Temp 98.2 °F (36.8 °C) (Oral)   Resp 16   Ht 5' 5\" (1.651 m)   Wt (!) 348 lb 1.6 oz (157.9 kg)   LMP  (LMP Unknown)   SpO2 90%   BMI 57.93 kg/m²     Last documented pain score (0-10 scale): Pain Level: 0  Last Weight:   Wt Readings from Last 1 Encounters:   23 (!) 348 lb 1.6 oz (157.9 kg)     Mental Status:  oriented, alert, and coherent    IV Access:  - None    Nursing Mobility/ADLs:  Walking   Assisted  Transfer  Assisted  Bathing  Assisted  Dressing  Assisted  Toileting  Assisted  Feeding  Independent  Med Admin  Assisted  Med Delivery   whole    Wound Care Documentation and Therapy:        Elimination:  Continence: Bowel: Yes  Bladder: Yes  Urinary Catheter: None   Colostomy/Ileostomy/Ileal Conduit: No       Date of Last BM: 03/15    Intake/Output Summary (Last 24 hours) at 3/16/2023 1140  Last data filed at 3/16/2023 0927  Gross per 24 hour   Intake 240 ml   Output 500 ml   Net -260 ml     I/O last 3 completed shifts:   In: 150 [I.V.:150]  Out: 1050 [Urine:1050]    Safety Concerns:     None    Impairments/Disabilities:      None    Nutrition Therapy:  Current Nutrition Therapy:   { VIKRAM Diet List:760384577}    Routes of Feeding: {P DME Other Feedings:815022524}  Liquids: {Slp liquid thickness:04355}  Daily Fluid Restriction: {CHP DME Yes amt example:764149694}  Last Modified Barium Swallow with Video (Video Swallowing Test): {Done Not Done CZVZ:383890076}    Treatments at the Time of Hospital Discharge:   Respiratory Treatments: ***  Oxygen Therapy:  {Therapy; copd oxygen:10645}  Ventilator:    { CC Vent APXR:530229138}    Rehab Therapies: {THERAPEUTIC INTERVENTION:9396883388}  Weight Bearing Status/Restrictions: { CC Weight Bearin}  Other Medical Equipment (for information only, NOT a DME order):  {EQUIPMENT:677080231}  Other Treatments: ***    Patient's personal belongings (please select all that are sent with patient):  {CHP DME Belongings:200877005}    RN SIGNATURE:  Electronically signed by Mukesh Tripathi RN on 3/16/23 at 11:43 AM EDT    CASE MANAGEMENT/SOCIAL WORK SECTION    Inpatient Status Date: ***    Readmission Risk Assessment Score:  Readmission Risk              Risk of Unplanned Readmission:  20           Discharging to Facility/ Agency   Name:   Address:  Phone:  Fax:    Dialysis Facility (if applicable)   Name:  Address:  Dialysis Schedule:  Phone:  Fax:    / signature: {Esignature:686715460}    PHYSICIAN SECTION    Prognosis: {Prognosis:7185747051}    Condition at Discharge: [address] [place] [address] Patient Condition:345328975}    Rehab Potential (if transferring to Rehab): {Prognosis:6963745333}    Recommended Labs or Other Treatments After Discharge: ***    Physician Certification: I certify the above information and transfer of Alejandro Guerrero  is necessary for the continuing treatment of the diagnosis listed and that she requires {Admit to Appropriate Level of Care:08979} for {GREATER/LESS:210251352} 30 days.      Update Admission H&P: {CHP DME Changes in RTUSH:724325230}    PHYSICIAN SIGNATURE:  {Esignature:865881604}

## 2023-03-16 NOTE — CARE COORDINATION
Case Management Assessment  Initial Evaluation    Date/Time of Evaluation: 3/16/2023 2:50 PM  Assessment Completed by: Michi Rao RN    If patient is discharged prior to next notation, then this note serves as note for discharge by case management. Patient Name: Niels Simms                   YOB: 1965  Diagnosis: Physical debility [R53.81]                   Date / Time: 3/16/2023  1:47 PM    Patient Admission Status: SKILLED IP   Readmission Risk (Low < 19, Mod (19-27), High > 27): Readmission Risk Score: 18.8    Current PCP: No primary care provider on file. PCP verified by CM? Yes    Chart Reviewed: Yes      History Provided by: Patient  Patient Orientation: Alert and Oriented    Patient Cognition: Alert    Hospitalization in the last 30 days (Readmission):  Yes    If yes, Readmission Assessment in CM Navigator will be completed. Advance Directives:      Code Status: Full Code   Patient's Primary Decision Maker is: Named in Scanned ACP Document    Primary Decision Maker: jamie steven - Child - [phone]    Discharge Planning:    Patient lives with: Alone Type of Home: Apartment  Primary Care Giver: Self  Patient Support Systems include: Children   Current Financial resources: Medicare  Current community resources: None  Current services prior to admission: Oxygen Therapy            Current DME: None             Type of Home Care services:  OT, PT, Nursing Services    ADLS  Prior functional level: Independent in ADLs/IADLs  Current functional level: Assistance with the following:, Bathing, Toileting, Dressing, Mobility, Shopping, Housework, Cooking    PT AM-PAC:   /24  OT AM-PAC:   /24    Family can provide assistance at DC: Yes  Would you like Case Management to discuss the discharge plan with any other family members/significant others, and if so, who?  No  Plans to Return to Present Housing: Yes  Other Identified Issues/Barriers to RETURNING to current housing: None   Potential

## 2023-03-16 NOTE — CARE COORDINATION
LSW received a call informing this LSW that pt has been approved to go to [facility]. Auth # [id number]. LSW PS doctor and informed him of the approval. Pt will need a rapid COVID RN to call report to [phone]. LSW messaged Janene with [facility] and informed her of the approval.      Pt going to [facility] Unit today. [place] to  pt at 1:00pm.  Pt, RN and Chasity Weems at the [facility] aware of  time.

## 2023-03-16 NOTE — DISCHARGE SUMMARY
V2.0  Discharge Summary    Name:  Jennifer Masters /Age/Sex: 1965 (62 y.o. female)   Admit Date: 3/11/2023  Discharge Date: 3/16/23    MRN & CSN:  [id number] & [id number] Encounter Date and Time 3/16/23 1:00 PM EDT    Attending:  Walter Damico MD Discharging Provider: Walter Damico MD       Hospital Course:     Brief HPI: Jennifer Masters, a 62 y.o. female, with a history of IDDM, Class III Obesity, HFpEF, recent MSSA bacteremia with Endophthalmitis Rt. eye, RAMON, rhabdo, normocytic anemia due to anemia of chronic disease and B12 deficiency, COPD on 2 L/min home oxygen, hypertension, SAAD was admitted on 3/11/2023. They had concerns including Shortness of Breath (Recently discharged from an ECF. States that upon arriving home pt started to have SOB w/ Wheezing. 2 Albuterol, 1 Atrovent given by EMS with positive effect. ). Assessment  Acute on chronic decompensated HFpEF  She has a history of CHF  Presented today with shortness of breath -she was just discharged home from [facility] today after recent hospitalization at [facility]  Progressively worsening swelling in her legs -3+ bilaterally, improved to 1+. Of note, she states that after she was discharged home, she went home and ate a large meatball sub. And she had not taken her Lasix on the day of admission  proBNP elevated at 8871 -which is above her last reading of 1415 on 2023  Chest x-ray with interstitial edema with bilateral small pleural effusions with what appears to be alveolar edema as well  Was given Lasix 40 mg IV x1 in the ER  Changed IV to PO bumex 1mg BID today. Diuresing well. Echocardiogram showed grade 2 DD  Strict I's and O's  Swing bed placement     2. Normocytic anemia 2/2 anemia of chronic disease and B12 deficiency  Hemoglobin was 7.3  She was started on B12 supplements recently  GI consult placed, blood transfusion. Also, EGD normal Bx taken from antrum. Plan for Colonoscopy outpatient  Transfuse to get Hb >8     3.    NSTEMI type II 2/2 likely demand ischemia 2/2 decompensated heart failure  Troponin elevated 0.060 on admission, no chest pain     4. Lactic acidemia 2/2 likely type B 2/2 decompensated heart failure  Lactic acid is 2.5, repeat pending     5. Acute on chronic hypoxic and hypercapnic respiratory failure 2/2 above, probable SAAD/OHS, doubt infection  Patient is on baseline 2 L/min oxygen at home  Apparently, patient was found hypoxemic by EMS and was given breathing treatment and increased her nasal cannula to 4 L/min  She has no white count and her Pro-Kamron is negative  Blood cultures were sent by the emergency department  PCO2 56 on initial ABG     6. History of COPD on 2 L/min home oxygen     History of hypertension, hyperlipidemia     8. Insulin-dependent diabetes mellitus -recently started on insulin  Most recent A1c-9.5% (2/2023) despite low hemoglobin  At [facility], she was started on Lantus 30 units along with 10 units 3 times daily of Humalog with meals     9. Recent MSSA bacteremia and right eye endophthalmitis  She was recently here in [facility] and was transferred to ICU due to MSSA bacteremia with right eye endophthalmitis  She was supposed to be on oxacillin IV, however, because the patient was refusing it-this was changed to oral dicloxacillin 500 mg 4 times daily with an end date of 3/18/2023 by infectious disease at [address] [place] [address] taking dicloxacillin with end of treatment date of 3/18/2023    Recent RAMON and rhabdomyolysis  11. Class III obesity  BMI 53.25  12. Hypomagnesemia:              A. Replete as needed      The patient expressed appropriate understanding of, and agreement with the discharge recommendations, medications, and plan.      Consults this admission:  IP CONSULT TO HEART FAILURE NURSE/COORDINATOR  IP CONSULT TO DIETITIAN  IP CONSULT TO CARDIOLOGY  IP CONSULT TO GI  IP CONSULT TO IV TEAM    Discharge Diagnosis:   Acute on chronic heart failure with preserved ejection fraction (HFpEF) [facility])      Discharge Instruction:   Follow up appointments: PCP, cardio   Primary care physician: No primary care provider on file. within 2 weeks  Diet: cardiac diet   Activity: activity as tolerated  Disposition: Discharged to:  Swing bed  Condition on discharge: Stable  Labs and Tests to be Followed up as an outpatient by PCP or Specialist:     Discharge Medications:        Medication List        START taking these medications      atorvastatin 20 MG tablet  Commonly known as: LIPITOR  Take 1 tablet by mouth every evening     bumetanide 1 MG tablet  Commonly known as: BUMEX  Take 1 tablet by mouth daily  Start taking on: March 17, 2023     dicloxacillin 500 MG capsule  Commonly known as: DYNAPEN  Take 1 capsule by mouth 4 times daily for 2 doses            CONTINUE taking these medications      carvedilol 6.25 MG tablet  Commonly known as: COREG     cyanocobalamin 100 MCG tablet     glipiZIDE 10 MG extended release tablet  Commonly known as: GLUCOTROL XL     insulin glargine-yfgn 100 UNIT/ML injection vial  Commonly known as: SEMGLEE-YFGN     metFORMIN 1000 MG tablet  Commonly known as: GLUCOPHAGE            STOP taking these medications      furosemide 40 MG tablet  Commonly known as: LASIX     insulin lispro (1 Unit Dial) 100 UNIT/ML Sopn  Commonly known as: HUMALOG/ADMELOG               Where to Get Your Medications        These medications were sent to [facility]/pharmacy #[id number], [address] [place], [place] [id number]      Phone: [phone]   atorvastatin 20 MG tablet  bumetanide 1 MG tablet  dicloxacillin 500 MG capsule        Objective Findings at Discharge:   /64   Pulse 78   Temp 98.2 °F (36.8 °C) (Oral)   Resp 16   Ht 5' 5\" (1.651 m)   Wt (!) 348 lb 1.6 oz (157.9 kg)   LMP  (LMP Unknown)   SpO2 90%   BMI 57.93 kg/m²       Physical Exam:   Physical Exam  Vitals reviewed. Constitutional:       Appearance: Normal appearance.  She is normal weight. HENT:      Head: Normocephalic. Nose: Nose normal.      Mouth/Throat:      Mouth: Mucous membranes are moist.   Eyes:      Conjunctiva/sclera: Conjunctivae normal.      Pupils: Pupils are equal, round, and reactive to light. Cardiovascular:      Rate and Rhythm: Normal rate and regular rhythm. Pulses: Normal pulses. Heart sounds: Normal heart sounds. No murmur heard. Pulmonary:      Effort: Pulmonary effort is normal.      Breath sounds: Normal breath sounds. No wheezing, rhonchi or rales. Abdominal:      General: Abdomen is flat. Bowel sounds are normal. There is distension. Palpations: Abdomen is soft. Tenderness: There is no abdominal tenderness. Musculoskeletal:         General: No deformity. Normal range of motion. Cervical back: Normal range of motion and neck supple. Right lower leg: Edema present. Left lower leg: Edema present. Skin:     Coloration: Skin is not jaundiced or pale. Neurological:      General: No focal deficit present. Mental Status: She is alert and oriented to person, place, and time. Mental status is at baseline. Motor: Weakness present. Psychiatric:         Mood and Affect: Mood normal.         Behavior: Behavior normal.            Labs and Imaging   XR CHEST PORTABLE    Result Date: 3/11/2023  EXAMINATION: ONE XRAY VIEW OF THE CHEST 3/11/2023 7:31 pm COMPARISON: 02/13/2023 and prior HISTORY: ORDERING SYSTEM PROVIDED HISTORY: resp failure with hypoxemia TECHNOLOGIST PROVIDED HISTORY: Reason for exam:->resp failure with hypoxemia Reason for Exam: resp failure with hypoxemia Additional signs and symptoms: resp failure with hypoxemia FINDINGS: Unchanged mild-to-moderate cardiomegaly. Unchanged small right and probable small left pleural effusions with patchy opacities overlying the bilateral middle and lower lung zones. Vascular congestion with Kerley B lines. No pneumothorax. No acute osseous abnormality. Mild to moderate cardiomegaly with mild interstitial edema and small bilateral pleural effusions, findings which can be seen with congestive heart failure. Patchy opacities overlying the bilateral middle and lower lung zones could represent alveolar edema with underlying infection not excluded. CBC:   Recent Labs     03/14/23 0215 03/16/23 0340   WBC 5.6 4.0   HGB 7.9* 8.0*    174     BMP:    Recent Labs     03/14/23 0215 03/14/23  1800 03/16/23  0340    140 144   K 3.5 3.7 3.5   CL 97* 95* 96*   CO2 40* 41* 44*   BUN 20 19 15   CREATININE 1.1 1.2* 0.9   GLUCOSE 138* 194* 116*     Hepatic: No results for input(s): AST, ALT, ALB, BILITOT, ALKPHOS in the last 72 hours. Lipids:   Lab Results   Component Value Date/Time    CHOL 97 03/12/2023 04:14 AM    HDL 35 03/12/2023 04:14 AM    TRIG 126 03/12/2023 04:14 AM     Hemoglobin A1C:   Lab Results   Component Value Date/Time    LABA1C 9.5 02/14/2023 12:44 AM     TSH: No results found for: TSH  Troponin:   Lab Results   Component Value Date/Time    TROPONINT 0.062 03/12/2023 04:14 AM    TROPONINT 0.060 03/11/2023 07:20 PM     Lactic Acid: No results for input(s): LACTA in the last 72 hours.   BNP:   Recent Labs     03/14/23 0215   PROBNP 4,969*     UA:  Lab Results   Component Value Date/Time    NITRU NEGATIVE 02/12/2023 07:47 PM    COLORU YELLOW 02/12/2023 07:47 PM    WBCUA NONE SEEN 02/12/2023 07:47 PM    RBCUA 1 02/12/2023 07:47 PM    TRICHOMONAS NONE SEEN 02/12/2023 07:47 PM    BACTERIA NEGATIVE 02/12/2023 07:47 PM    CLARITYU CLEAR 02/12/2023 07:47 PM    SPECGRAV 1.025 02/12/2023 07:47 PM    LEUKOCYTESUR NEGATIVE 02/12/2023 07:47 PM    UROBILINOGEN 1.0 02/12/2023 07:47 PM    BILIRUBINUR MODERATE NUMBER OR AMOUNT OBSERVED 02/12/2023 07:47 PM    BLOODU LARGE NUMBER OR AMOUNT OBSERVED 02/12/2023 07:47 PM    KETUA >80 02/12/2023 07:47 PM     Urine Cultures: No results found for: LABURIN  Blood Cultures: No results found for: BC  No results found for: BLOODCULT2  Organism: No results found for: ORG    Time Spent Discharging patient 35 minutes    Electronically signed by Charlene Mckeon MD on 3/16/2023 at 1:00 PM

## 2023-03-16 NOTE — CARE COORDINATION
[facility] precert for admission to [facility]. Auth # [id number]. Authed until 3/20 with next update due 3/20. Precert is good until 7/09. Shu HALE aware.

## 2023-03-16 NOTE — DISCHARGE INSTR - DIET
Good nutrition is important when healing from an illness, injury, or surgery. Follow any nutrition recommendations given to you during your hospital stay. If you were given an oral nutrition supplement while in the hospital, continue to take this supplement at home. You can take it with meals, in-between meals, and/or before bedtime. These supplements can be purchased at most local grocery stores, pharmacies, and chain [employer]-stores. If you have any questions about your diet or nutrition, call the hospital and ask for the dietitian.   Cardiac diet, fluid restriction 1500 ml/day

## 2023-03-16 NOTE — CARE COORDINATION
SWINGBED PATIENT ACTIVITY PROGRAM ASSESSMENT    Patient Name: Linda Loo  : 1965   Gender: female   Diagnosis:  Physical debility [R53.81] MRN: [id number]     Ability to read or write? [x] Yes   [] No  Ability to hear? [x] Yes   [] No  Is patient confused? [] Yes   [x] No    Enter Codes in Boxes Coding:  Very Important  Somewhat important  Not very important  Not important at all  Important but cant do or no choice  No response or non-responsive   4 A. How important is it to you to have books, newspapers, and magazines to read?   1 B. How important is it to you to listen to music you like? Current Hits   4 C. How important is it to you to be around animals such as pets?   4 D. How important is it to you to keep up with the news?   4 E. How important is it to you to do things with groups of people?   2 F. How important is it to you to do your favorite activities?   2 G. How important is it to you to go outside to get fresh air when the weather is good?   1 H. How important is it to you to participate in Confucianism services or practices? Activity Care Plan: Will participate in activity programs of choice daily with no decline throughout [facility] stay.       [address]:  Deonna Wallace  Date: 3/16/2023

## 2023-03-16 NOTE — PROGRESS NOTES
Met with patient today for swing bed activities. Patient provided information for activity assessment/interest.  Patient offered choice of hidden word puzzle book that she enjoys doing.       Sharon Duggan Activities

## 2023-03-16 NOTE — PROGRESS NOTES
Physical Therapy    Physical Therapy Treatment Note  Name: Mell Hoff MRN: [id number] :   1965   Date:  3/16/2023   Admission Date: 3/11/2023 Room:  [address] [place], [place] [address]   Restrictions/Precautions:          general precautions, falls   Subjective:  Patient states:  \"I think I'm able to go to the swing bed\"   Pain:   Location, Type, Intensity (0/10 to 10/10):  denies   Objective:    Observation:    Supine in bed. Cooperative with therapy. Incontinent of urine. Objective Measures:   Stable vitals on 2L   Treatment, including education/measures:  Supine to sit: SBA with use of bed rail, HOB elevated, and inc time/effort   Sitting balance: SBA for safety at EOB and BSC, static with single UE support  Scooting: SBA for safety fwd to EOB with inc time and effort. Use of bed rail for support. Cues for fwd weight shift   Sit to stand: CGA for safety from EOB and BSC to RW  Stand to sit: CGA for safety to [facility] and recliner   Step pivot: CGA with RW (2x)   Ambulation: ~6ft + 4ft with RW CGA for safety. Dec pace with wider TENA and dec bilat step length. No major LOB noted  Standing balance: CGA for safety at RW, static stance x ~1 minute. Pt needed total assist with elicia care. Educated pt on POC, role of PT, DME use, discharge.  Cues provided for sequencing to inc safety and indep with mobility   Assessment / Impression:    Patient's tolerance of treatment:  good    Adverse Reaction: no  Significant change in status and impact:  no  Barriers to improvement:  activity tolerance, strength, balance, fluid build up   Plan for Next Session:    Activity tolerance, strength, balance, gait, transfers, bed mobility   Time in:  1025  Time out:  1055  Timed treatment minutes:  30   Total treatment time:  30 minutes       Short Term Goals  Time Frame for Short Term Goals: 2 weeks  Short Term Goal 1: Pt will perform sit><supine SBA  Short Term Goal 2: Pt will transfer to all surfaces SBA  Short Term Goal 3: Pt will ambulate 75ft with LRAD SBA  Short Term Goal 4: Pt will perform standing light dynamic activity x 3 minutes, single UE support if needed SBA    Electronically signed by:    Franco Rojas, [id number]  3/16/2023, 10:44 AM

## 2023-03-17 LAB
ANION GAP SERPL CALCULATED.3IONS-SCNC: 7 MMOL/L (ref 4–16)
BUN SERPL-MCNC: 14 MG/DL (ref 6–23)
CALCIUM SERPL-MCNC: 8.3 MG/DL (ref 8.3–10.6)
CHLORIDE BLD-SCNC: 97 MMOL/L (ref 99–110)
CLOTEST: NEGATIVE
CO2: 41 MMOL/L (ref 21–32)
CREAT SERPL-MCNC: 1 MG/DL (ref 0.6–1.1)
GFR SERPL CREATININE-BSD FRML MDRD: >60 ML/MIN/1.73M2
GLUCOSE BLD-MCNC: 101 MG/DL (ref 70–99)
GLUCOSE BLD-MCNC: 149 MG/DL (ref 70–99)
GLUCOSE BLD-MCNC: 206 MG/DL (ref 70–99)
GLUCOSE BLD-MCNC: 213 MG/DL (ref 70–99)
GLUCOSE SERPL-MCNC: 101 MG/DL (ref 70–99)
HCT VFR BLD CALC: 28.4 % (ref 37–47)
HEMOGLOBIN: 8.2 GM/DL (ref 12.5–16)
MAGNESIUM: 1.6 MG/DL (ref 1.8–2.4)
MCH RBC QN AUTO: 27.2 PG (ref 27–31)
MCHC RBC AUTO-ENTMCNC: 28.9 % (ref 32–36)
MCV RBC AUTO: 94 FL (ref 78–100)
PDW BLD-RTO: 15.2 % (ref 11.7–14.9)
PLATELET # BLD: 180 K/CU MM (ref 140–440)
PMV BLD AUTO: 10.7 FL (ref 7.5–11.1)
POTASSIUM SERPL-SCNC: 3.4 MMOL/L (ref 3.5–5.1)
RBC # BLD: 3.02 M/CU MM (ref 4.2–5.4)
SODIUM BLD-SCNC: 145 MMOL/L (ref 135–145)
WBC # BLD: 4.7 K/CU MM (ref 4–10.5)

## 2023-03-17 PROCEDURE — 6360000002 HC RX W HCPCS: Performed by: NURSE PRACTITIONER

## 2023-03-17 PROCEDURE — 6370000000 HC RX 637 (ALT 250 FOR IP): Performed by: STUDENT IN AN ORGANIZED HEALTH CARE EDUCATION/TRAINING PROGRAM

## 2023-03-17 PROCEDURE — 97116 GAIT TRAINING THERAPY: CPT

## 2023-03-17 PROCEDURE — 83735 ASSAY OF MAGNESIUM: CPT

## 2023-03-17 PROCEDURE — 36415 COLL VENOUS BLD VENIPUNCTURE: CPT

## 2023-03-17 PROCEDURE — 97166 OT EVAL MOD COMPLEX 45 MIN: CPT

## 2023-03-17 PROCEDURE — 85027 COMPLETE CBC AUTOMATED: CPT

## 2023-03-17 PROCEDURE — 97162 PT EVAL MOD COMPLEX 30 MIN: CPT

## 2023-03-17 PROCEDURE — 97535 SELF CARE MNGMENT TRAINING: CPT

## 2023-03-17 PROCEDURE — 6370000000 HC RX 637 (ALT 250 FOR IP): Performed by: NURSE PRACTITIONER

## 2023-03-17 PROCEDURE — 1200000002 HC SEMI PRIVATE SWING BED

## 2023-03-17 PROCEDURE — 94761 N-INVAS EAR/PLS OXIMETRY MLT: CPT

## 2023-03-17 PROCEDURE — 80048 BASIC METABOLIC PNL TOTAL CA: CPT

## 2023-03-17 PROCEDURE — 97530 THERAPEUTIC ACTIVITIES: CPT

## 2023-03-17 PROCEDURE — 2580000003 HC RX 258: Performed by: STUDENT IN AN ORGANIZED HEALTH CARE EDUCATION/TRAINING PROGRAM

## 2023-03-17 PROCEDURE — 6360000002 HC RX W HCPCS: Performed by: STUDENT IN AN ORGANIZED HEALTH CARE EDUCATION/TRAINING PROGRAM

## 2023-03-17 PROCEDURE — 82962 GLUCOSE BLOOD TEST: CPT

## 2023-03-17 RX ORDER — INSULIN LISPRO 100 [IU]/ML
10 INJECTION, SOLUTION INTRAVENOUS; SUBCUTANEOUS
Status: DISCONTINUED | OUTPATIENT
Start: 2023-03-17 | End: 2023-04-01 | Stop reason: HOSPADM

## 2023-03-17 RX ORDER — INSULIN LISPRO 100 [IU]/ML
0-4 INJECTION, SOLUTION INTRAVENOUS; SUBCUTANEOUS NIGHTLY
Status: DISCONTINUED | OUTPATIENT
Start: 2023-03-17 | End: 2023-04-01 | Stop reason: HOSPADM

## 2023-03-17 RX ORDER — MAGNESIUM SULFATE IN WATER 40 MG/ML
2000 INJECTION, SOLUTION INTRAVENOUS ONCE
Status: COMPLETED | OUTPATIENT
Start: 2023-03-17 | End: 2023-03-17

## 2023-03-17 RX ORDER — GLIPIZIDE 5 MG/1
10 TABLET ORAL
Status: DISCONTINUED | OUTPATIENT
Start: 2023-03-18 | End: 2023-03-17

## 2023-03-17 RX ORDER — ALBUTEROL SULFATE 90 UG/1
2 AEROSOL, METERED RESPIRATORY (INHALATION) EVERY 6 HOURS PRN
Status: DISCONTINUED | OUTPATIENT
Start: 2023-03-17 | End: 2023-04-01 | Stop reason: HOSPADM

## 2023-03-17 RX ORDER — INSULIN LISPRO 100 [IU]/ML
0-4 INJECTION, SOLUTION INTRAVENOUS; SUBCUTANEOUS
Status: DISCONTINUED | OUTPATIENT
Start: 2023-03-17 | End: 2023-04-01 | Stop reason: HOSPADM

## 2023-03-17 RX ADMIN — CYANOCOBALAMIN TAB 1000 MCG 1000 MCG: 1000 TAB at 08:07

## 2023-03-17 RX ADMIN — DICLOXACILLIN SODIUM 500 MG: 250 CAPSULE ORAL at 17:56

## 2023-03-17 RX ADMIN — MAGNESIUM SULFATE HEPTAHYDRATE 2000 MG: 40 INJECTION, SOLUTION INTRAVENOUS at 10:49

## 2023-03-17 RX ADMIN — INSULIN GLARGINE 30 UNITS: 100 INJECTION, SOLUTION SUBCUTANEOUS at 20:20

## 2023-03-17 RX ADMIN — DICLOXACILLIN SODIUM 500 MG: 250 CAPSULE ORAL at 23:52

## 2023-03-17 RX ADMIN — INSULIN LISPRO 1 UNITS: 100 INJECTION, SOLUTION INTRAVENOUS; SUBCUTANEOUS at 17:56

## 2023-03-17 RX ADMIN — INSULIN LISPRO 10 UNITS: 100 INJECTION, SOLUTION INTRAVENOUS; SUBCUTANEOUS at 17:56

## 2023-03-17 RX ADMIN — ACETAMINOPHEN 650 MG: 325 TABLET ORAL at 21:09

## 2023-03-17 RX ADMIN — ENOXAPARIN SODIUM 40 MG: 100 INJECTION SUBCUTANEOUS at 20:19

## 2023-03-17 RX ADMIN — BUMETANIDE 1 MG: 1 TABLET ORAL at 08:07

## 2023-03-17 RX ADMIN — PANTOPRAZOLE SODIUM 40 MG: 40 TABLET, DELAYED RELEASE ORAL at 06:37

## 2023-03-17 RX ADMIN — CARVEDILOL 6.25 MG: 6.25 TABLET, FILM COATED ORAL at 08:07

## 2023-03-17 RX ADMIN — ATORVASTATIN CALCIUM 20 MG: 20 TABLET, FILM COATED ORAL at 17:55

## 2023-03-17 RX ADMIN — DICLOXACILLIN SODIUM 500 MG: 250 CAPSULE ORAL at 06:37

## 2023-03-17 RX ADMIN — SODIUM CHLORIDE, PRESERVATIVE FREE 10 ML: 5 INJECTION INTRAVENOUS at 23:05

## 2023-03-17 RX ADMIN — DICLOXACILLIN SODIUM 500 MG: 250 CAPSULE ORAL at 12:03

## 2023-03-17 RX ADMIN — DICLOXACILLIN SODIUM 500 MG: 250 CAPSULE ORAL at 00:27

## 2023-03-17 RX ADMIN — SODIUM CHLORIDE, PRESERVATIVE FREE 10 ML: 5 INJECTION INTRAVENOUS at 10:49

## 2023-03-17 RX ADMIN — CARVEDILOL 6.25 MG: 6.25 TABLET, FILM COATED ORAL at 17:55

## 2023-03-17 RX ADMIN — ENOXAPARIN SODIUM 40 MG: 100 INJECTION SUBCUTANEOUS at 08:07

## 2023-03-17 ASSESSMENT — PAIN DESCRIPTION - DESCRIPTORS: DESCRIPTORS: ACHING

## 2023-03-17 ASSESSMENT — PAIN DESCRIPTION - ORIENTATION: ORIENTATION: RIGHT

## 2023-03-17 ASSESSMENT — PAIN DESCRIPTION - FREQUENCY: FREQUENCY: INTERMITTENT

## 2023-03-17 ASSESSMENT — PAIN SCALES - GENERAL
PAINLEVEL_OUTOF10: 0
PAINLEVEL_OUTOF10: 3

## 2023-03-17 ASSESSMENT — PAIN DESCRIPTION - ONSET: ONSET: GRADUAL

## 2023-03-17 ASSESSMENT — PAIN - FUNCTIONAL ASSESSMENT: PAIN_FUNCTIONAL_ASSESSMENT: ACTIVITIES ARE NOT PREVENTED

## 2023-03-17 ASSESSMENT — PAIN DESCRIPTION - PAIN TYPE: TYPE: ACUTE PAIN

## 2023-03-17 ASSESSMENT — PAIN DESCRIPTION - LOCATION: LOCATION: LEG

## 2023-03-17 NOTE — PROGRESS NOTES
SWING BED WEEKLY TEAM SHEET     WEEK#  1     NUTRITION   Diet:    Diabetic 5 carb choices                 TF:   none       TPN:   none  Appropriate/Adequate [X] yes [ ] no   Meal intakes: %    Weight: 347#   BMI:    57.88            Significant Change: No significant change noted at this time. Recommendations: Continue to provide current diet order Diabetic 5 carb choices. May benefit from addition of cardiac no added salt as well. Will follow and monitor for possible need of further diabetic/low sodium diet education prior to discharge. Issues to be resolved before discharge: PO intake of at least 75% of meals consistently.      Dietitian Signature:   Alanna Hanks RD, LD  [phone]

## 2023-03-17 NOTE — PROGRESS NOTES
notified  Restraints  Restraints Initially in Place: No     Restrictions  Restrictions/Precautions  Restrictions/Precautions: Fall Risk, General Precautions  Position Activity Restriction  Other position/activity restrictions: O2     Subjective:  Pain: 0/10  General  Chart Reviewed: Yes  Patient assessed for rehabilitation services?: Yes  Family / Caregiver Present: No  Follows Commands: Within Functional Limits         Social/Functional History:  Social/Functional History  Lives With: Alone  Type of Home: Apartment  Home Layout: One level  Home Access: Level entry  Bathroom Shower/Tub: Tub/Shower unit  Bathroom Toilet: Standard  Home Equipment: Oxygen (Pt wears 2L of O2 24/7 and has portable and concentrator tanks)  Receives Help From: Family  ADL Assistance: Independent (Pt reports she wears slip on shoes and doesn't nette socks)  Homemaking Assistance: Independent  Homemaking Responsibilities: Yes  Meal Prep Responsibility: Primary  Laundry Responsibility: Primary  Cleaning Responsibility: Primary  Shopping Responsibility: Primary  Ambulation Assistance: Independent  Transfer Assistance: Independent  Active : Yes  Mode of Transportation: Car  Occupation: On disability  Leisure & Hobbies: Pt reports she watches TV and goes out to visit with family/grandkids  IADL Comments: Pt reports she is indep with ADLs.   Vision/Hearing:  Vision  Vision: Within Functional Limits  Hearing  Hearing: Within functional limits    Cognition:  Orientation  Overall Orientation Status: Within Normal Limits  Cognition  Overall Cognitive Status: WNL   Objective:  Heart Rate: 63  Heart Rate Source: Monitor  BP: 137/69  BP Location: Right lower arm  BP Method: Automatic  MAP (Calculated): 92  Resp: 16  SpO2: 96 %     Observation/Palpation  Observation: Pt awake supine in bed on 2L of O2  Edema: bilateral dorsal feet  Gross Assessment  AROM: Generally decreased, functional  Strength: Generally decreased, functional  Tone: Normal  Sensation: Intact      Strength RLE  Strength RLE: Exception  Comment: all others at least 4/5  R Hip Flexion: 3-/5  R Knee Extension: 4-/5  Strength LLE  Strength LLE: Exception  Comment: all others at least 4/5  L Hip Flexion: 3-/5        Bed Mobility Training  Bed Mobility Training: Yes  Supine to Sit: Minimum assistance  Scooting: Stand-by assistance  Balance  Sitting: Intact  Standing: With support  Transfer Training  Transfer Training: Yes  Sit to Stand: Contact-guard assistance;Minimum assistance (CGA from EOB and higher w/c, Mod A from lower recliner)  Stand to Sit: Stand-by assistance  Gait Training  Gait Training: Yes  Gait  Overall Level of Assistance: Stand-by assistance  Speed/Farhana: Pace decreased (< 100 feet/min)  Step Length: Left shortened;Right shortened  Distance (ft):  (5ft in AM EOB>recliner. Later pt completes 42+37+10ft with RW and CGA)  Assistive Device: Gait belt;Walker, rolling                          AM-PAC Score  Basic Mobility Six Clicks Form   How much difficulty does the patient currently have Unable   (pt is unable to do activity) A Lot   (activity is a struggle, requires great effort/time) A Little   (pt can manage, but takes more effort/time than should) None   (pt has no difficulty)   Turning over in bed   []1 []2  [x]3  []4    STS from a chair with arms  []1 []2 [x]3   []4     Supine to/from sitting EOB []1 []2  [x]3   []4     Assistance required Total   (Total/Dependent Assist) A Lot   (Max/Mod Assist) A Little   (Min/CGA/Supervision) None   (No human assistance)   Moving to and from a bed to a chair  []1  []2   [x]3  []4     To walk in a hospital room? []1 []2   [x]3    []4    Climbing 3-5 steps with a railing?  []1  []2   [x]3    []4       Raw Score 18   Standardized Score 43.63   CMS 0-100% Score 46.58%   CMS Modifier CK   CK = 40-60% impaired      Goals  Short Term Goals  Time Frame for Short Term Goals: 1 week  Short Term Goal 1: Pt will perform sit><supine Mod

## 2023-03-17 NOTE — PROGRESS NOTES
Occupational Therapy  Facility/Department: [address] [place] [address]  Occupational Therapy Initial Assessment    Name: Loren Connors  : 1965  MRN: [id number]  Date of Service: 3/17/2023    Discharge Recommendations:  Home with assist PRN  OT Equipment Recommendations  Equipment Needed: No (will further assess)       Patient Diagnosis(es): There were no encounter diagnoses. Past Medical History:  has a past medical history of Sepsis ([facility] [address] Past Surgical History:  has a past surgical history that includes Upper gastrointestinal endoscopy (N/A, 3/15/2023). Treatment Diagnosis: Weakness      Assessment   Performance deficits / Impairments: Decreased functional mobility ; Decreased ADL status; Decreased strength;Decreased endurance;Decreased high-level IADLs  Assessment: Pt is a 61 yo female admitted to [facility] unit for physical debility s/p Chronic Hypercapnic/Hypoxemic Respiratory Failure. Pt presents with decreased endurance, functional mobility tolerate and ADL status. Pt will benefit from OT to improve strength/endurance and maximize indep with functional mobility and ADLs.   Treatment Diagnosis: Weakness  Prognosis: Good  Decision Making: Medium Complexity  REQUIRES OT FOLLOW-UP: Yes  Activity Tolerance  Activity Tolerance: Patient Tolerated treatment well        Plan   Occupational Therapy Plan  Times Per Week: 4+  Current Treatment Recommendations: Strengthening, Balance training, Functional mobility training, Endurance training, Patient/Caregiver education & training, Self-Care / ADL, Equipment evaluation, education, & procurement, Safety education & training     Restrictions  Restrictions/Precautions  Restrictions/Precautions: Fall Risk, General Precautions  Position Activity Restriction  Other position/activity restrictions: O2    Subjective   General  Patient assessed for rehabilitation services?: Yes  Family / Caregiver Present: No  Subjective  Subjective: Pt reports she is feeling pretty good

## 2023-03-17 NOTE — H&P
down to her home oxygen level and was discharged in stable condition to [facility].    Patient seen and evaluated this morning at bedside was doing very well.  She denies any chest pain nausea vomiting fevers or chills or any shortness of breath.  States that she is looking forward to working with physical therapy with a goal of going home soon.  Ten point ROS reviewed negative, unless as noted above    Objective:   No intake or output data in the 24 hours ending 03/17/23 0921   Vitals:   Vitals:    03/17/23 0747   BP:    Pulse:    Resp:    Temp:    SpO2: 96%     Physical Exam:   GEN Awake female, sitting upright in bed in no apparent distress. Appears given age.  EYES Pupils are equally round.  No scleral erythema, discharge, or conjunctivitis.  NECK Supple, no apparent thyromegaly or masses.  RESP Clear to auscultation, no wheezes, rales or rhonchi.  Symmetric chest movement while on 2L NC (baseline)  CARDIO/VASC S1/S2 auscultated. Regular rate without appreciable murmurs, rubs, or gallops. No JVD or carotid bruits. Does have bilateral lower extremity edema, non-pitting  GI Abdomen is soft without significant tenderness, masses, or guarding    No costovertebral angle tenderness.   HEME/LYMPH No petechiae or ecchymoses.  MSK No gross joint deformities.  SKIN Normal coloration, warm, dry.  NEURO Cranial nerves appear grossly intact, normal speech  PSYCH Awake, alert, oriented x 4.  Affect appropriate.    Past Medical History:      Past Medical History:   Diagnosis Date    Sepsis (HCC)      PSHX:  has a past surgical history that includes Upper gastrointestinal endoscopy (N/A, 3/15/2023).    Allergies: No Known Allergies    FAM HX: Family history reviewed and noncontributory  Soc HX:   Social History     Socioeconomic History    Marital status:    Tobacco Use    Smoking status: Never    Smokeless tobacco: Never   Substance and Sexual Activity    Alcohol use: Not Currently    Drug use: Not  Currently       Medications:   Medications:    carvedilol  6.25 mg Oral BID WC    insulin lispro  0-4 Units SubCUTAneous TID WC    insulin lispro  0-4 Units SubCUTAneous Nightly    atorvastatin  20 mg Oral QPM    sodium chloride flush  5-40 mL IntraVENous 2 times per day    insulin glargine  30 Units SubCUTAneous QPM    insulin lispro  10 Units SubCUTAneous TID WC    cyanocobalamin  1,000 mcg Oral Daily    dicloxacillin  500 mg Oral 4x Daily    bumetanide  1 mg Oral Daily    enoxaparin  40 mg SubCUTAneous BID    pantoprazole  40 mg Oral QAM AC      Infusions:   PRN Meds: glucose, 4 tablet, PRN  glucagon (rDNA), 1 mg, PRN  ondansetron, 4 mg, Q8H PRN   Or  ondansetron, 4 mg, Q6H PRN  polyethylene glycol, 17 g, Daily PRN  acetaminophen, 650 mg, Q6H PRN   Or  acetaminophen, 650 mg, Q6H PRN          Electronically signed by Meenu Jang MD on 3/17/2023 at 9:21 AM

## 2023-03-17 NOTE — PROGRESS NOTES
Met with patient today for swing bed activities. Provided patient with [employer] day quote today. Also provided materials for this weekend's activities; March famous birthday quiz as well as Spiritual reflection.   Ishmael Vo

## 2023-03-18 LAB
GLUCOSE BLD-MCNC: 127 MG/DL (ref 70–99)
GLUCOSE BLD-MCNC: 149 MG/DL (ref 70–99)
GLUCOSE BLD-MCNC: 157 MG/DL (ref 70–99)
GLUCOSE BLD-MCNC: 216 MG/DL (ref 70–99)

## 2023-03-18 PROCEDURE — 2580000003 HC RX 258: Performed by: STUDENT IN AN ORGANIZED HEALTH CARE EDUCATION/TRAINING PROGRAM

## 2023-03-18 PROCEDURE — 97530 THERAPEUTIC ACTIVITIES: CPT

## 2023-03-18 PROCEDURE — 82962 GLUCOSE BLOOD TEST: CPT

## 2023-03-18 PROCEDURE — 6370000000 HC RX 637 (ALT 250 FOR IP): Performed by: STUDENT IN AN ORGANIZED HEALTH CARE EDUCATION/TRAINING PROGRAM

## 2023-03-18 PROCEDURE — 1200000002 HC SEMI PRIVATE SWING BED

## 2023-03-18 PROCEDURE — 6360000002 HC RX W HCPCS: Performed by: STUDENT IN AN ORGANIZED HEALTH CARE EDUCATION/TRAINING PROGRAM

## 2023-03-18 PROCEDURE — 97110 THERAPEUTIC EXERCISES: CPT

## 2023-03-18 PROCEDURE — 94761 N-INVAS EAR/PLS OXIMETRY MLT: CPT

## 2023-03-18 PROCEDURE — 2700000000 HC OXYGEN THERAPY PER DAY

## 2023-03-18 RX ADMIN — INSULIN GLARGINE 30 UNITS: 100 INJECTION, SOLUTION SUBCUTANEOUS at 19:50

## 2023-03-18 RX ADMIN — BUMETANIDE 1 MG: 1 TABLET ORAL at 08:23

## 2023-03-18 RX ADMIN — PANTOPRAZOLE SODIUM 40 MG: 40 TABLET, DELAYED RELEASE ORAL at 05:49

## 2023-03-18 RX ADMIN — ACETAMINOPHEN 650 MG: 325 TABLET ORAL at 22:33

## 2023-03-18 RX ADMIN — DICLOXACILLIN SODIUM 500 MG: 250 CAPSULE ORAL at 17:47

## 2023-03-18 RX ADMIN — CARVEDILOL 6.25 MG: 6.25 TABLET, FILM COATED ORAL at 08:23

## 2023-03-18 RX ADMIN — ENOXAPARIN SODIUM 40 MG: 100 INJECTION SUBCUTANEOUS at 08:24

## 2023-03-18 RX ADMIN — INSULIN LISPRO 10 UNITS: 100 INJECTION, SOLUTION INTRAVENOUS; SUBCUTANEOUS at 17:47

## 2023-03-18 RX ADMIN — INSULIN LISPRO 10 UNITS: 100 INJECTION, SOLUTION INTRAVENOUS; SUBCUTANEOUS at 12:45

## 2023-03-18 RX ADMIN — ENOXAPARIN SODIUM 40 MG: 100 INJECTION SUBCUTANEOUS at 19:50

## 2023-03-18 RX ADMIN — ATORVASTATIN CALCIUM 20 MG: 20 TABLET, FILM COATED ORAL at 17:47

## 2023-03-18 RX ADMIN — SODIUM CHLORIDE, PRESERVATIVE FREE 10 ML: 5 INJECTION INTRAVENOUS at 08:26

## 2023-03-18 RX ADMIN — INSULIN LISPRO 1 UNITS: 100 INJECTION, SOLUTION INTRAVENOUS; SUBCUTANEOUS at 12:45

## 2023-03-18 RX ADMIN — DICLOXACILLIN SODIUM 500 MG: 250 CAPSULE ORAL at 05:49

## 2023-03-18 RX ADMIN — INSULIN LISPRO 10 UNITS: 100 INJECTION, SOLUTION INTRAVENOUS; SUBCUTANEOUS at 08:23

## 2023-03-18 RX ADMIN — CARVEDILOL 6.25 MG: 6.25 TABLET, FILM COATED ORAL at 17:47

## 2023-03-18 RX ADMIN — CYANOCOBALAMIN TAB 1000 MCG 1000 MCG: 1000 TAB at 08:23

## 2023-03-18 RX ADMIN — DICLOXACILLIN SODIUM 500 MG: 250 CAPSULE ORAL at 11:58

## 2023-03-18 ASSESSMENT — PAIN DESCRIPTION - ONSET: ONSET: GRADUAL

## 2023-03-18 ASSESSMENT — PAIN DESCRIPTION - ORIENTATION: ORIENTATION: RIGHT;LEFT

## 2023-03-18 ASSESSMENT — PAIN DESCRIPTION - DESCRIPTORS: DESCRIPTORS: ACHING

## 2023-03-18 ASSESSMENT — PAIN DESCRIPTION - PAIN TYPE: TYPE: ACUTE PAIN

## 2023-03-18 ASSESSMENT — PAIN SCALES - GENERAL
PAINLEVEL_OUTOF10: 3
PAINLEVEL_OUTOF10: 0

## 2023-03-18 ASSESSMENT — PAIN - FUNCTIONAL ASSESSMENT: PAIN_FUNCTIONAL_ASSESSMENT: ACTIVITIES ARE NOT PREVENTED

## 2023-03-18 ASSESSMENT — PAIN DESCRIPTION - LOCATION: LOCATION: LEG

## 2023-03-18 ASSESSMENT — PAIN DESCRIPTION - FREQUENCY: FREQUENCY: INTERMITTENT

## 2023-03-18 NOTE — FLOWSHEET NOTE
squeeze      Resisted hip abd 1x15 YTB      Resisted knee flex 1x15 YTB      Shoulder flex 1x10 B UE      Scapular retraction  1x10                Modality/intervention used:   [x] Therapeutic Exercise   [ ] Modalities:   [x] Therapeutic Activity     [ ] Ultrasound   [ ] Elec Stim   [ ] Gait Training     [ ] Cervical Traction  [ ] Lumbar Traction   [ ] Neuromuscular Re-education   [ ] Cold/hotpack  [ ] Iontophoresis   [ ] Instruction in HEP     [ ] Vasopneumatic   [ ] Manual Therapy   [ ] Aquatic Therapy     Other Therapeutic Activities: PT A pt to wash, dry, and put lotion on lower legs and feet    Home Exercise Program/Education provided to patient: Pt educated to perform exercises on her own 2 more times today and 3x tomorrow. She was also educated to ambulate with nursing in [place] later today and a couple times tomorrow. Manual Treatments: none    Communication with other providers: none    Adverse reactions to treatment: none noted. Pt reported that she has no pain, but is tired.      Treatment/Activity Tolerance:   [ ] Patient limited by fatigue [ ] Patient limited by pain [ ] Patient limited by other medical complications [x] Patient tolerated therapy well  [ ] Other:     Post Tx Pain Ratin/10     Safety Precautions:   [ ] left in bed  [x] left in chair [x] call light within reach  [ ] bed alarm on  [x] personal alarm on  [ ] other staff present:    Plan:   [x] Continue per plan of care [ ] Marisela Apley current plan   [ ] Plan of care initiated [ ] Hold pending MD visit [ ] Discharge     Plan for Next Session: continue per POC    Time In: 10:15 am  Time Out: 10:55 am  Total Treatment Minutes: 40'  Billed Units: 1 therapeutic activity, 2 therapeutic exercise    Signed: Malika Nelson, PT,DPT [id number]  3/18/2023, 10:57 AM

## 2023-03-19 LAB
ANION GAP SERPL CALCULATED.3IONS-SCNC: 2 MMOL/L (ref 4–16)
BUN SERPL-MCNC: 12 MG/DL (ref 6–23)
CALCIUM SERPL-MCNC: 8.3 MG/DL (ref 8.3–10.6)
CHLORIDE BLD-SCNC: 100 MMOL/L (ref 99–110)
CO2: 43 MMOL/L (ref 21–32)
CREAT SERPL-MCNC: 1 MG/DL (ref 0.6–1.1)
GFR SERPL CREATININE-BSD FRML MDRD: >60 ML/MIN/1.73M2
GLUCOSE BLD-MCNC: 123 MG/DL (ref 70–99)
GLUCOSE BLD-MCNC: 182 MG/DL (ref 70–99)
GLUCOSE BLD-MCNC: 184 MG/DL (ref 70–99)
GLUCOSE BLD-MCNC: 99 MG/DL (ref 70–99)
GLUCOSE SERPL-MCNC: 111 MG/DL (ref 70–99)
HCT VFR BLD CALC: 29.1 % (ref 37–47)
HEMOGLOBIN: 8.1 GM/DL (ref 12.5–16)
MAGNESIUM: 1.9 MG/DL (ref 1.8–2.4)
MCH RBC QN AUTO: 26 PG (ref 27–31)
MCHC RBC AUTO-ENTMCNC: 27.8 % (ref 32–36)
MCV RBC AUTO: 93.3 FL (ref 78–100)
PDW BLD-RTO: 15.3 % (ref 11.7–14.9)
PLATELET # BLD: 184 K/CU MM (ref 140–440)
PMV BLD AUTO: 10.3 FL (ref 7.5–11.1)
POTASSIUM SERPL-SCNC: 3.3 MMOL/L (ref 3.5–5.1)
RBC # BLD: 3.12 M/CU MM (ref 4.2–5.4)
SODIUM BLD-SCNC: 145 MMOL/L (ref 135–145)
WBC # BLD: 4 K/CU MM (ref 4–10.5)

## 2023-03-19 PROCEDURE — 80048 BASIC METABOLIC PNL TOTAL CA: CPT

## 2023-03-19 PROCEDURE — 6360000002 HC RX W HCPCS: Performed by: STUDENT IN AN ORGANIZED HEALTH CARE EDUCATION/TRAINING PROGRAM

## 2023-03-19 PROCEDURE — 1200000002 HC SEMI PRIVATE SWING BED

## 2023-03-19 PROCEDURE — 85027 COMPLETE CBC AUTOMATED: CPT

## 2023-03-19 PROCEDURE — 83735 ASSAY OF MAGNESIUM: CPT

## 2023-03-19 PROCEDURE — 82962 GLUCOSE BLOOD TEST: CPT

## 2023-03-19 PROCEDURE — 6370000000 HC RX 637 (ALT 250 FOR IP): Performed by: STUDENT IN AN ORGANIZED HEALTH CARE EDUCATION/TRAINING PROGRAM

## 2023-03-19 PROCEDURE — 6370000000 HC RX 637 (ALT 250 FOR IP): Performed by: INTERNAL MEDICINE

## 2023-03-19 RX ORDER — POTASSIUM CHLORIDE 20 MEQ/1
40 TABLET, EXTENDED RELEASE ORAL ONCE
Status: COMPLETED | OUTPATIENT
Start: 2023-03-19 | End: 2023-03-19

## 2023-03-19 RX ADMIN — INSULIN LISPRO 10 UNITS: 100 INJECTION, SOLUTION INTRAVENOUS; SUBCUTANEOUS at 17:38

## 2023-03-19 RX ADMIN — ENOXAPARIN SODIUM 40 MG: 100 INJECTION SUBCUTANEOUS at 08:52

## 2023-03-19 RX ADMIN — INSULIN GLARGINE 30 UNITS: 100 INJECTION, SOLUTION SUBCUTANEOUS at 19:59

## 2023-03-19 RX ADMIN — ACETAMINOPHEN 650 MG: 325 TABLET ORAL at 19:58

## 2023-03-19 RX ADMIN — POTASSIUM CHLORIDE 40 MEQ: 1500 TABLET, EXTENDED RELEASE ORAL at 08:52

## 2023-03-19 RX ADMIN — PANTOPRAZOLE SODIUM 40 MG: 40 TABLET, DELAYED RELEASE ORAL at 05:44

## 2023-03-19 RX ADMIN — INSULIN LISPRO 10 UNITS: 100 INJECTION, SOLUTION INTRAVENOUS; SUBCUTANEOUS at 12:42

## 2023-03-19 RX ADMIN — ATORVASTATIN CALCIUM 20 MG: 20 TABLET, FILM COATED ORAL at 17:38

## 2023-03-19 RX ADMIN — INSULIN LISPRO 10 UNITS: 100 INJECTION, SOLUTION INTRAVENOUS; SUBCUTANEOUS at 08:52

## 2023-03-19 RX ADMIN — ENOXAPARIN SODIUM 40 MG: 100 INJECTION SUBCUTANEOUS at 19:59

## 2023-03-19 RX ADMIN — CARVEDILOL 6.25 MG: 6.25 TABLET, FILM COATED ORAL at 08:52

## 2023-03-19 RX ADMIN — CARVEDILOL 6.25 MG: 6.25 TABLET, FILM COATED ORAL at 17:38

## 2023-03-19 RX ADMIN — BUMETANIDE 1 MG: 1 TABLET ORAL at 09:34

## 2023-03-19 RX ADMIN — CYANOCOBALAMIN TAB 1000 MCG 1000 MCG: 1000 TAB at 08:52

## 2023-03-19 ASSESSMENT — PAIN DESCRIPTION - FREQUENCY: FREQUENCY: INTERMITTENT

## 2023-03-19 ASSESSMENT — PAIN SCALES - GENERAL
PAINLEVEL_OUTOF10: 3
PAINLEVEL_OUTOF10: 1
PAINLEVEL_OUTOF10: 0

## 2023-03-19 ASSESSMENT — PAIN DESCRIPTION - PAIN TYPE: TYPE: ACUTE PAIN

## 2023-03-19 ASSESSMENT — PAIN DESCRIPTION - ONSET: ONSET: ON-GOING

## 2023-03-19 ASSESSMENT — PAIN - FUNCTIONAL ASSESSMENT: PAIN_FUNCTIONAL_ASSESSMENT: ACTIVITIES ARE NOT PREVENTED

## 2023-03-19 ASSESSMENT — PAIN DESCRIPTION - ORIENTATION: ORIENTATION: MID

## 2023-03-19 ASSESSMENT — PAIN DESCRIPTION - LOCATION: LOCATION: HEAD

## 2023-03-19 ASSESSMENT — PAIN DESCRIPTION - DESCRIPTORS: DESCRIPTORS: ACHING

## 2023-03-19 NOTE — PROGRESS NOTES
Hospitalist Progress Note      Name:  Ivan Bowen /Age/Sex: 1965  (62 y.o. female)   MRN & CSN:  [id number] & [id number] Admission Date/Time: 3/16/2023  1:47 PM   Location:   PCP: No primary care provider on file. Hospital Day: 4    Assessment and Plan:   Ivan Bowen is a 62 y.o.  female  who presents with Physical debility    Physical Debility: Continue PT/OT daily  Hypokalemia: 3.3 this AM, PO potassium supplementation ordered  Chronic Hypercapnic/Hypoxemic Respiratory Failure: Continue home bronchodilators, is on 2L at baseline, will continue  Hx COPD: On 2L chronically, does not appear she is on long acting bronchodilators,  albuterol MDI ordered PRN  Hx Recent MSSA Bacteremia and Endophthalmitis: Continue Dicloxacillin  Hx HFpEF: Continue Bumex  Hx Mod/Severe TR, Likely Pulmonary HTN: Continue Bumex  Hx HTN: Continue Coreg  Hx Insulin Dependent Diabetes: Continue Lantus, scheduled Humalog, SSI  Hx AOCD/B12 Deficiency: Continue B12 supplements  Hx HLD: Continue Atorvastatin    Diet ADULT DIET; Regular; 5 carb choices (75 gm/meal)   DVT Prophylaxis [] Lovenox, []  Heparin, [] SCDs, [] Ambulation   GI Prophylaxis [] PPI,  [] H2 Blocker,  [] Carafate,  [] Diet/Tube Feeds   Code Status Full Code   Disposition Patient requires continued admission due to    MDM [] Low, [] Moderate,[]  High  Patient's risk as above due to      History of Present Illness:     Patient is resting comfortably in bed this morning with no complaints. Overall has been doing very well her vital signs been stable. Objective: Intake/Output Summary (Last 24 hours) at 3/19/2023 0808  Last data filed at 3/18/2023 1744  Gross per 24 hour   Intake 720 ml   Output --   Net 720 ml      Vitals:   Vitals:    23 0724   BP: 134/77   Pulse: 78   Resp: 16   Temp: 97.9 °F (36.6 °C)   SpO2: 98%     Physical Exam:   GEN Awake female, sitting upright in bed in no apparent distress. Appears given age.   EYES Pupils are equally round. No scleral erythema, discharge, or conjunctivitis. NECK Supple, no apparent thyromegaly or masses. RESP Symmetric chest movement while on 2L NC  HEME/LYMPH No petechiae or ecchymoses. MSK No gross joint deformities. SKIN Normal coloration, warm, dry. NEURO Cranial nerves appear grossly intact, normal speech  PSYCH Awake, alert, oriented x 4. Affect appropriate.     Medications:   Medications:    potassium chloride  40 mEq Oral Once    insulin lispro  10 Units SubCUTAneous TID WC    insulin glargine  30 Units SubCUTAneous Nightly    insulin lispro  0-4 Units SubCUTAneous TID WC    insulin lispro  0-4 Units SubCUTAneous Nightly    carvedilol  6.25 mg Oral BID WC    atorvastatin  20 mg Oral QPM    sodium chloride flush  5-40 mL IntraVENous 2 times per day    cyanocobalamin  1,000 mcg Oral Daily    bumetanide  1 mg Oral Daily    enoxaparin  40 mg SubCUTAneous BID    pantoprazole  40 mg Oral QAM AC      Infusions:   PRN Meds: albuterol sulfate HFA, 2 puff, Q6H PRN  glucose, 4 tablet, PRN  glucagon (rDNA), 1 mg, PRN  ondansetron, 4 mg, Q8H PRN   Or  ondansetron, 4 mg, Q6H PRN  polyethylene glycol, 17 g, Daily PRN  acetaminophen, 650 mg, Q6H PRN   Or  acetaminophen, 650 mg, Q6H PRN          Electronically signed by Wayne Cohen MD on 3/19/2023 at 8:08 AM

## 2023-03-20 LAB
ANION GAP SERPL CALCULATED.3IONS-SCNC: 4 MMOL/L (ref 4–16)
BUN SERPL-MCNC: 10 MG/DL (ref 6–23)
CALCIUM SERPL-MCNC: 8.5 MG/DL (ref 8.3–10.6)
CHLORIDE BLD-SCNC: 98 MMOL/L (ref 99–110)
CO2: 40 MMOL/L (ref 21–32)
CREAT SERPL-MCNC: 1 MG/DL (ref 0.6–1.1)
GFR SERPL CREATININE-BSD FRML MDRD: >60 ML/MIN/1.73M2
GLUCOSE BLD-MCNC: 127 MG/DL (ref 70–99)
GLUCOSE BLD-MCNC: 131 MG/DL (ref 70–99)
GLUCOSE BLD-MCNC: 211 MG/DL (ref 70–99)
GLUCOSE BLD-MCNC: 247 MG/DL (ref 70–99)
GLUCOSE SERPL-MCNC: 134 MG/DL (ref 70–99)
POTASSIUM SERPL-SCNC: 3.7 MMOL/L (ref 3.5–5.1)
SODIUM BLD-SCNC: 142 MMOL/L (ref 135–145)

## 2023-03-20 PROCEDURE — 6370000000 HC RX 637 (ALT 250 FOR IP): Performed by: STUDENT IN AN ORGANIZED HEALTH CARE EDUCATION/TRAINING PROGRAM

## 2023-03-20 PROCEDURE — 6360000002 HC RX W HCPCS: Performed by: STUDENT IN AN ORGANIZED HEALTH CARE EDUCATION/TRAINING PROGRAM

## 2023-03-20 PROCEDURE — 94761 N-INVAS EAR/PLS OXIMETRY MLT: CPT

## 2023-03-20 PROCEDURE — 36415 COLL VENOUS BLD VENIPUNCTURE: CPT

## 2023-03-20 PROCEDURE — 1200000002 HC SEMI PRIVATE SWING BED

## 2023-03-20 PROCEDURE — 97116 GAIT TRAINING THERAPY: CPT

## 2023-03-20 PROCEDURE — 2700000000 HC OXYGEN THERAPY PER DAY

## 2023-03-20 PROCEDURE — 97110 THERAPEUTIC EXERCISES: CPT

## 2023-03-20 PROCEDURE — 80048 BASIC METABOLIC PNL TOTAL CA: CPT

## 2023-03-20 RX ADMIN — ENOXAPARIN SODIUM 40 MG: 100 INJECTION SUBCUTANEOUS at 08:17

## 2023-03-20 RX ADMIN — BUMETANIDE 1 MG: 1 TABLET ORAL at 08:17

## 2023-03-20 RX ADMIN — CARVEDILOL 6.25 MG: 6.25 TABLET, FILM COATED ORAL at 08:17

## 2023-03-20 RX ADMIN — PANTOPRAZOLE SODIUM 40 MG: 40 TABLET, DELAYED RELEASE ORAL at 05:36

## 2023-03-20 RX ADMIN — INSULIN LISPRO 10 UNITS: 100 INJECTION, SOLUTION INTRAVENOUS; SUBCUTANEOUS at 17:42

## 2023-03-20 RX ADMIN — ENOXAPARIN SODIUM 40 MG: 100 INJECTION SUBCUTANEOUS at 21:04

## 2023-03-20 RX ADMIN — ATORVASTATIN CALCIUM 20 MG: 20 TABLET, FILM COATED ORAL at 17:41

## 2023-03-20 RX ADMIN — ACETAMINOPHEN 650 MG: 325 TABLET ORAL at 16:40

## 2023-03-20 RX ADMIN — INSULIN LISPRO 1 UNITS: 100 INJECTION, SOLUTION INTRAVENOUS; SUBCUTANEOUS at 17:41

## 2023-03-20 RX ADMIN — CYANOCOBALAMIN TAB 1000 MCG 1000 MCG: 1000 TAB at 08:17

## 2023-03-20 RX ADMIN — CARVEDILOL 6.25 MG: 6.25 TABLET, FILM COATED ORAL at 17:41

## 2023-03-20 RX ADMIN — INSULIN LISPRO 10 UNITS: 100 INJECTION, SOLUTION INTRAVENOUS; SUBCUTANEOUS at 12:47

## 2023-03-20 RX ADMIN — INSULIN GLARGINE 30 UNITS: 100 INJECTION, SOLUTION SUBCUTANEOUS at 21:04

## 2023-03-20 ASSESSMENT — PAIN DESCRIPTION - DESCRIPTORS
DESCRIPTORS: DISCOMFORT
DESCRIPTORS: DISCOMFORT

## 2023-03-20 ASSESSMENT — PAIN DESCRIPTION - PAIN TYPE
TYPE: ACUTE PAIN
TYPE: ACUTE PAIN

## 2023-03-20 ASSESSMENT — PAIN SCALES - GENERAL
PAINLEVEL_OUTOF10: 0
PAINLEVEL_OUTOF10: 3

## 2023-03-20 ASSESSMENT — PAIN DESCRIPTION - FREQUENCY
FREQUENCY: INTERMITTENT
FREQUENCY: INTERMITTENT

## 2023-03-20 ASSESSMENT — PAIN DESCRIPTION - ONSET
ONSET: GRADUAL
ONSET: GRADUAL

## 2023-03-20 ASSESSMENT — PAIN - FUNCTIONAL ASSESSMENT
PAIN_FUNCTIONAL_ASSESSMENT: ACTIVITIES ARE NOT PREVENTED
PAIN_FUNCTIONAL_ASSESSMENT: ACTIVITIES ARE NOT PREVENTED

## 2023-03-20 ASSESSMENT — PAIN DESCRIPTION - LOCATION
LOCATION: HEAD
LOCATION: HEAD

## 2023-03-20 ASSESSMENT — PAIN DESCRIPTION - ORIENTATION: ORIENTATION: MID

## 2023-03-20 NOTE — PROGRESS NOTES
Met with patient today for swing bed activities. Talked with patient today about the first day of spring and about favorite things about the season. Reminded patient about Gab Blinks the therapy dog coming by tomorrow.   Ernestina Fleming

## 2023-03-20 NOTE — CARE COORDINATION
CM submitted updated clinical documentation to [address] [place] [address] to request an extension to her stay in the swing bed program.  CM will follow.

## 2023-03-20 NOTE — PROGRESS NOTES
Occupational Therapy    Occupational Therapy Treatment Note  Name: Marco Samuels MRN: [id number] :   1965   Date:  3/20/2023   Admission Date: 3/16/2023 Room:  008008-01   Restrictions/Precautions:  Restrictions/Precautions  Restrictions/Precautions: Fall Risk, General Precautions     Communication with other providers:   Cleared for treatment by RN. Subjective:  Patient states:  \"I'm really tired right now\"  Pain:   Location, Type, Intensity (0/10 to 10/10):  no pain    Objective:    Observation:  pt alert and oriented      Treatment, including education:      Therapeutic Exercise:  Pt completed UE exercises to increase strength and ROM to facilitate increase for ADLs and functional mobility. Pt completed B UEs with 2# dumbbell exercises with curls, shoulder presses, punch, stirring and wrist curls x 20 reps with mod rest breaks due to fatigue. Safety Measures: Gait belt used, Left in bed, Pull/Bed Alarm activated and call light left in reach        Assessment / Impression:        Patient's tolerance of treatment: Good   Adverse Reaction: None  Significant change in status and impact:  None  Barriers to improvement:  dec strength and endurance    Plan for Next Session:    Continue with POC. Time in:  1535  Time out:  1600  Total treatment time:  25  Billed Units: 2 TE  Electronically signed by:    Nhung Sanabria, [address]    3/20/2023, 3:41 PM    Previously filed values:  Patient Goals   Patient goals :  To go home  Short Term Goals  Time Frame for Short Term Goals: Until DC or goals met  Short Term Goal 1: Pt will complete functional trfs mod I  Short Term Goal 2: Pt will complete UE ADLs mod I  Short Term Goal 3: Pt will complete LE ADLs mod I  Short Term Goal 4: Pt will tolerate >8 min of standing for theract/ADLs w/o increased SOB  Short Term Goal 5: Pt will complete BUE therex to increase strength/endurance for functional mobility/ADLs

## 2023-03-20 NOTE — PLAN OF CARE
Problem: Safety - Adult  Goal: Free from fall injury  3/20/2023 0956 by Aamir Ramos RN  Outcome: Progressing  3/19/2023 2003 by Musa Cruz RN  Outcome: Progressing     Problem: Discharge Planning  Goal: Discharge to home or other facility with appropriate resources  3/20/2023 0956 by Aamir Ramos RN  Outcome: Progressing  3/19/2023 2003 by Musa Cruz RN  Outcome: Progressing     Problem: ABCDS Injury Assessment  Goal: Absence of physical injury  3/20/2023 0956 by Aamir Ramos RN  Outcome: Progressing  3/19/2023 2003 by Musa Cruz RN  Outcome: Progressing     Problem: Skin/Tissue Integrity  Goal: Absence of new skin breakdown  Description: 1. Monitor for areas of redness and/or skin breakdown  2. Assess vascular access sites hourly  3. Every 4-6 hours minimum:  Change oxygen saturation probe site  4. Every 4-6 hours:  If on nasal continuous positive airway pressure, respiratory therapy assess nares and determine need for appliance change or resting period.   3/20/2023 0956 by Aamir Ramos RN  Outcome: Adequate for Discharge  3/19/2023 2003 by Musa Cruz RN  Outcome: Progressing     Problem: Pain  Goal: Verbalizes/displays adequate comfort level or baseline comfort level  3/20/2023 0956 by Aamir Ramos RN  Outcome: Adequate for Discharge  3/19/2023 2003 by Musa Cruz RN  Outcome: Progressing

## 2023-03-20 NOTE — FLOWSHEET NOTE
portable and concentrator tanks)    Goals:         Short Term Goals  Time Frame for Short Term Goals: 1 week  Short Term Goal 1: Pt will perform sit><supine Mod I  Short Term Goal 2: Pt will transfer to all surfaces SBA  Short Term Goal 3: Pt will ambulate 75ft with LRAD SBA  Short Term Goal 4: Pt will perform standing light dynamic activity x 3 minutes, single UE support if needed SBA         Time In 1144   Time Out 1212   Total Minutes  28   Billed Units 1Gt, 1TE       Signed: Yashira Calixto, PT, DPT [id number] 3/20/2023, 3:04 PM

## 2023-03-21 LAB
GLUCOSE BLD-MCNC: 135 MG/DL (ref 70–99)
GLUCOSE BLD-MCNC: 174 MG/DL (ref 70–99)
GLUCOSE BLD-MCNC: 176 MG/DL (ref 70–99)
GLUCOSE BLD-MCNC: 191 MG/DL (ref 70–99)

## 2023-03-21 PROCEDURE — 1200000002 HC SEMI PRIVATE SWING BED

## 2023-03-21 PROCEDURE — 2700000000 HC OXYGEN THERAPY PER DAY

## 2023-03-21 PROCEDURE — 97530 THERAPEUTIC ACTIVITIES: CPT

## 2023-03-21 PROCEDURE — 94761 N-INVAS EAR/PLS OXIMETRY MLT: CPT

## 2023-03-21 PROCEDURE — 82962 GLUCOSE BLOOD TEST: CPT

## 2023-03-21 PROCEDURE — 6360000002 HC RX W HCPCS: Performed by: STUDENT IN AN ORGANIZED HEALTH CARE EDUCATION/TRAINING PROGRAM

## 2023-03-21 PROCEDURE — 6370000000 HC RX 637 (ALT 250 FOR IP): Performed by: STUDENT IN AN ORGANIZED HEALTH CARE EDUCATION/TRAINING PROGRAM

## 2023-03-21 PROCEDURE — 97116 GAIT TRAINING THERAPY: CPT

## 2023-03-21 PROCEDURE — 97535 SELF CARE MNGMENT TRAINING: CPT

## 2023-03-21 RX ADMIN — ATORVASTATIN CALCIUM 20 MG: 20 TABLET, FILM COATED ORAL at 17:37

## 2023-03-21 RX ADMIN — ENOXAPARIN SODIUM 40 MG: 100 INJECTION SUBCUTANEOUS at 21:03

## 2023-03-21 RX ADMIN — CARVEDILOL 6.25 MG: 6.25 TABLET, FILM COATED ORAL at 08:44

## 2023-03-21 RX ADMIN — INSULIN LISPRO 10 UNITS: 100 INJECTION, SOLUTION INTRAVENOUS; SUBCUTANEOUS at 17:36

## 2023-03-21 RX ADMIN — ACETAMINOPHEN 650 MG: 325 TABLET ORAL at 21:03

## 2023-03-21 RX ADMIN — CYANOCOBALAMIN TAB 1000 MCG 1000 MCG: 1000 TAB at 08:44

## 2023-03-21 RX ADMIN — INSULIN GLARGINE 30 UNITS: 100 INJECTION, SOLUTION SUBCUTANEOUS at 21:03

## 2023-03-21 RX ADMIN — ENOXAPARIN SODIUM 40 MG: 100 INJECTION SUBCUTANEOUS at 08:44

## 2023-03-21 RX ADMIN — CARVEDILOL 6.25 MG: 6.25 TABLET, FILM COATED ORAL at 17:37

## 2023-03-21 RX ADMIN — INSULIN LISPRO 10 UNITS: 100 INJECTION, SOLUTION INTRAVENOUS; SUBCUTANEOUS at 12:12

## 2023-03-21 RX ADMIN — INSULIN LISPRO 10 UNITS: 100 INJECTION, SOLUTION INTRAVENOUS; SUBCUTANEOUS at 08:46

## 2023-03-21 RX ADMIN — PANTOPRAZOLE SODIUM 40 MG: 40 TABLET, DELAYED RELEASE ORAL at 08:46

## 2023-03-21 RX ADMIN — BUMETANIDE 1 MG: 1 TABLET ORAL at 08:44

## 2023-03-21 ASSESSMENT — PAIN DESCRIPTION - PAIN TYPE: TYPE: CHRONIC PAIN

## 2023-03-21 ASSESSMENT — PAIN SCALES - GENERAL
PAINLEVEL_OUTOF10: 0
PAINLEVEL_OUTOF10: 0
PAINLEVEL_OUTOF10: 3

## 2023-03-21 ASSESSMENT — PAIN DESCRIPTION - FREQUENCY: FREQUENCY: CONTINUOUS

## 2023-03-21 ASSESSMENT — PAIN DESCRIPTION - ORIENTATION: ORIENTATION: MID;ANTERIOR

## 2023-03-21 ASSESSMENT — PAIN DESCRIPTION - ONSET: ONSET: ON-GOING

## 2023-03-21 ASSESSMENT — PAIN DESCRIPTION - DESCRIPTORS: DESCRIPTORS: ACHING

## 2023-03-21 ASSESSMENT — PAIN DESCRIPTION - LOCATION: LOCATION: GENERALIZED

## 2023-03-21 ASSESSMENT — PAIN - FUNCTIONAL ASSESSMENT: PAIN_FUNCTIONAL_ASSESSMENT: ACTIVITIES ARE NOT PREVENTED

## 2023-03-21 NOTE — PROGRESS NOTES
Hospitalist Progress Note      Name:  Yves Watts /Age/Sex: 1965  (62 y.o. female)   MRN & CSN:  [id number] & [id number] Admission Date/Time: 3/16/2023  1:47 PM   Location:  008008-01 PCP: No primary care provider on file. Hospital Day: 6    Assessment and Plan:   Yves Watts is a 62 y.o.  female  who presents with Physical debility    Physical Debility: Continue PT/OT daily  Hypokalemia: 3.7 this AM, Continue PO potassium supplementation ordered  Chronic Hypercapnic/Hypoxemic Respiratory Failure: Continue home bronchodilators, is on 2L at baseline, will continue  Hx COPD: On 2L chronically, does not appear she is on long acting bronchodilators,  albuterol MDI ordered PRN  Hx Recent MSSA Bacteremia and Endophthalmitis: Continue Dicloxacillin  Hx HFpEF: Continue Bumex  Hx Mod/Severe TR, Likely Pulmonary HTN: Continue Bumex  Hx HTN: Continue Coreg  Hx Insulin Dependent Diabetes: Last HgbA1C 23 9.5; Continue Lantus, scheduled prandial Humalog, SSI  Hx AOCD/B12 Deficiency: Continue B12 supplements  Hx HLD: Continue Atorvastatin    Diet ADULT DIET; Regular; 5 carb choices (75 gm/meal)   DVT Prophylaxis [x] Lovenox, []  Heparin, [] SCDs, [] Ambulation   GI Prophylaxis [x] PPI,  [] H2 Blocker,  [] Carafate,  [] Diet/Tube Feeds   Code Status Full Code   Disposition Patient requires continued admission due to      History of Present Illness:     Patient is resting comfortably in bed this morning with no complaints. Overall has been doing well working with therapy. Objective: Intake/Output Summary (Last 24 hours) at 3/21/2023 1044  Last data filed at 3/21/2023 1007  Gross per 24 hour   Intake 960 ml   Output --   Net 960 ml        Vitals:   Vitals:    23 0827   BP: (!) 143/75   Pulse: 72   Resp: 18   Temp: 97.5 °F (36.4 °C)   SpO2: 99%     Physical Exam:   GEN Awake female, sitting upright in bed in no apparent distress. Appears given age. EYES Pupils are equally round.   No scleral erythema, discharge, or conjunctivitis. NECK Supple, no apparent thyromegaly or masses. RESP Symmetric chest movement while on 2L NC  HEME/LYMPH No petechiae or ecchymoses. MSK No gross joint deformities. SKIN Normal coloration, warm, dry. NEURO Cranial nerves appear grossly intact, normal speech  PSYCH Awake, alert, oriented x 4. Affect appropriate.     Medications:   Medications:    insulin lispro  10 Units SubCUTAneous TID WC    insulin glargine  30 Units SubCUTAneous Nightly    insulin lispro  0-4 Units SubCUTAneous TID WC    insulin lispro  0-4 Units SubCUTAneous Nightly    carvedilol  6.25 mg Oral BID WC    atorvastatin  20 mg Oral QPM    sodium chloride flush  5-40 mL IntraVENous 2 times per day    cyanocobalamin  1,000 mcg Oral Daily    bumetanide  1 mg Oral Daily    enoxaparin  40 mg SubCUTAneous BID    pantoprazole  40 mg Oral QAM AC      Infusions:   PRN Meds: albuterol sulfate HFA, 2 puff, Q6H PRN  glucose, 4 tablet, PRN  glucagon (rDNA), 1 mg, PRN  ondansetron, 4 mg, Q8H PRN   Or  ondansetron, 4 mg, Q6H PRN  polyethylene glycol, 17 g, Daily PRN  acetaminophen, 650 mg, Q6H PRN   Or  acetaminophen, 650 mg, Q6H PRN        Electronically signed by MASON Sanders CNP on 3/21/2023 at 10:44 AM

## 2023-03-21 NOTE — FLOWSHEET NOTE
Physical Therapy Daily Treatment Note   Date: 3/21/2023 Room: [address] [place] [address]    Name: Kiki Martinez : 1965   MRN: [id number] Admission Date:3/16/2023      Restrictions/Precautions: fall risk, O2    Initial Pain level: 0/10    Subjective/Observation: patient sitting in recliner, agreeable to therapy session    Communication with other providers: n/a      Therapeutic Activities/Neuro Re-Education/Gait Training   Date: 3/20/23   Date: 3/21/23 Date:  Date:    Bed   Mobility   x     x       STS   Transfer Min A from recliner to RW   SBA from recliner and w/c to RW     Standing Balance   For amb with CGA-SBA For amb with CGA-SBA       Ambulation 2x80ft with CGA-SBA and w/c follow for safety. AM: 15ft with RW and SBA    90+50+90+90ft with RW, SBA and A for O2 management, and w/c follow for safety. Extended time for rest required between bouts       Stairs   x     x       Therapeutic Exercises   Date: 3/20/23   Date:  Date:  Date:   Nustep 7:30 Lvl 3  Time limited d/t fatigue                 Education provided:       Treatment/Activity Tolerance:   [x] Patient limited by fatigue   [] Patient limited by pain   [] Patient limited by other medical complications   [x] Patient tolerated therapy well  [] Other:     Safety Precautions:     [] Left in bed    [x] Left in chair   [x] Call light within reach    [] Bed alarm on    [] Personal alarm on    [] Other staff present:  [] Family/Caregiver present:      Post Tx Pain Ratin/10       Evaluation Assessment  3/17/23  Assessment: Patient is a 62 y.o. female who presents to [facility] for the swingbed program to improve strength and endurance to increase independence with bed mobility, transfers, and ambulation. Patient would benefit from continued skilled physical therapy services to address decreased strength, ROM, and endurance, impaired balance, and decline in functional mobility.     Discharge Recommendations:  Continue to assess pending progress, Home with assist PRN, Home with Home health PT        Social/Functional History:  Lives With: Alone  Home Layout: One level  Home Access: Level entry     PT Equipment:  Home Equipment: Oxygen (Pt wears 2L of O2 24/7 and has portable and concentrator tanks)    Goals:         Short Term Goals  Time Frame for Short Term Goals: 1 week  Short Term Goal 1: Pt will perform sit><supine Mod I  Short Term Goal 2: Pt will transfer to all surfaces SBA  Short Term Goal 3: Pt will ambulate 75ft with LRAD SBA  Short Term Goal 4: Pt will perform standing light dynamic activity x 3 minutes, single UE support if needed SBA         Time In 1053   1325   Time Out 1114   1357   Total Minutes  21  +  32   Billed Units 3Gt, 1TA       Signed: Eric Burciaga, PT, DPT [id number] 3/21/2023, 3:25 PM

## 2023-03-22 LAB
GLUCOSE BLD-MCNC: 136 MG/DL (ref 70–99)
GLUCOSE BLD-MCNC: 182 MG/DL (ref 70–99)
GLUCOSE BLD-MCNC: 193 MG/DL (ref 70–99)
GLUCOSE BLD-MCNC: 222 MG/DL (ref 70–99)

## 2023-03-22 PROCEDURE — 97110 THERAPEUTIC EXERCISES: CPT

## 2023-03-22 PROCEDURE — 6370000000 HC RX 637 (ALT 250 FOR IP): Performed by: STUDENT IN AN ORGANIZED HEALTH CARE EDUCATION/TRAINING PROGRAM

## 2023-03-22 PROCEDURE — 97530 THERAPEUTIC ACTIVITIES: CPT

## 2023-03-22 PROCEDURE — 82962 GLUCOSE BLOOD TEST: CPT

## 2023-03-22 PROCEDURE — 2700000000 HC OXYGEN THERAPY PER DAY

## 2023-03-22 PROCEDURE — 6370000000 HC RX 637 (ALT 250 FOR IP): Performed by: PHYSICIAN ASSISTANT

## 2023-03-22 PROCEDURE — 94761 N-INVAS EAR/PLS OXIMETRY MLT: CPT

## 2023-03-22 PROCEDURE — 1200000002 HC SEMI PRIVATE SWING BED

## 2023-03-22 PROCEDURE — 97116 GAIT TRAINING THERAPY: CPT

## 2023-03-22 PROCEDURE — 6360000002 HC RX W HCPCS: Performed by: STUDENT IN AN ORGANIZED HEALTH CARE EDUCATION/TRAINING PROGRAM

## 2023-03-22 PROCEDURE — 97535 SELF CARE MNGMENT TRAINING: CPT

## 2023-03-22 RX ADMIN — ACETAMINOPHEN 650 MG: 325 TABLET ORAL at 11:23

## 2023-03-22 RX ADMIN — ATORVASTATIN CALCIUM 20 MG: 20 TABLET, FILM COATED ORAL at 17:53

## 2023-03-22 RX ADMIN — ENOXAPARIN SODIUM 40 MG: 100 INJECTION SUBCUTANEOUS at 20:34

## 2023-03-22 RX ADMIN — BUMETANIDE 1 MG: 1 TABLET ORAL at 11:10

## 2023-03-22 RX ADMIN — INSULIN LISPRO 10 UNITS: 100 INJECTION, SOLUTION INTRAVENOUS; SUBCUTANEOUS at 12:29

## 2023-03-22 RX ADMIN — CARVEDILOL 6.25 MG: 6.25 TABLET, FILM COATED ORAL at 17:54

## 2023-03-22 RX ADMIN — CARVEDILOL 6.25 MG: 6.25 TABLET, FILM COATED ORAL at 11:09

## 2023-03-22 RX ADMIN — INSULIN GLARGINE 30 UNITS: 100 INJECTION, SOLUTION SUBCUTANEOUS at 20:34

## 2023-03-22 RX ADMIN — INSULIN LISPRO 10 UNITS: 100 INJECTION, SOLUTION INTRAVENOUS; SUBCUTANEOUS at 17:54

## 2023-03-22 RX ADMIN — ENOXAPARIN SODIUM 40 MG: 100 INJECTION SUBCUTANEOUS at 11:10

## 2023-03-22 RX ADMIN — PANTOPRAZOLE SODIUM 40 MG: 40 TABLET, DELAYED RELEASE ORAL at 05:37

## 2023-03-22 RX ADMIN — CYANOCOBALAMIN TAB 1000 MCG 1000 MCG: 1000 TAB at 11:10

## 2023-03-22 RX ADMIN — INSULIN LISPRO 1 UNITS: 100 INJECTION, SOLUTION INTRAVENOUS; SUBCUTANEOUS at 12:28

## 2023-03-22 ASSESSMENT — PAIN SCALES - GENERAL
PAINLEVEL_OUTOF10: 2
PAINLEVEL_OUTOF10: 2
PAINLEVEL_OUTOF10: 6

## 2023-03-22 ASSESSMENT — PAIN DESCRIPTION - ORIENTATION: ORIENTATION: RIGHT;LEFT;MID

## 2023-03-22 ASSESSMENT — PAIN - FUNCTIONAL ASSESSMENT: PAIN_FUNCTIONAL_ASSESSMENT: ACTIVITIES ARE NOT PREVENTED

## 2023-03-22 ASSESSMENT — PAIN DESCRIPTION - DIRECTION: RADIATING_TOWARDS: NO

## 2023-03-22 ASSESSMENT — PAIN DESCRIPTION - FREQUENCY: FREQUENCY: CONTINUOUS

## 2023-03-22 ASSESSMENT — PAIN DESCRIPTION - LOCATION: LOCATION: HEAD;LEG

## 2023-03-22 ASSESSMENT — PAIN DESCRIPTION - ONSET: ONSET: GRADUAL

## 2023-03-22 ASSESSMENT — PAIN DESCRIPTION - PAIN TYPE: TYPE: CHRONIC PAIN

## 2023-03-22 ASSESSMENT — PAIN DESCRIPTION - DESCRIPTORS: DESCRIPTORS: ACHING

## 2023-03-22 NOTE — PROGRESS NOTES
Met with patient today for swing bed activity with jame Pedroza therapy visit. Patient expressed enjoyment and appreciation of visit.    [facility] Activities

## 2023-03-22 NOTE — PROGRESS NOTES
Occupational Therapy    Occupational Therapy Treatment Note  Name: Ivan Bowen MRN: [id number] :   1965   Date:  3/22/2023   Admission Date: 3/16/2023 Room:  008008-01   Restrictions/Precautions:  Restrictions/Precautions  Restrictions/Precautions: Fall Risk, General Precautions     Communication with other providers:   Cleared for treatment by RN. Subjective:  Patient states:  \"I feel Okay today, I guess\"  Pain:   Location, Type, Intensity (0/10 to 10/10):  6/10 after moving around during session in my knees. Objective:    Observation:  pt alert and oriented      Treatment, including education:  Transfers    Sit to stand :CGA  Stand to sit :[address] [place] [address] Training:   Activities performed today included the following: Toileting  CGA with toilet hygiene and clothing management. Therapeutic Exercise:  Pt completed UE exercises to increase strength and ROM to facilitate increase for ADLs and functional mobility. Pt completed B UEs with 3# dumbbell exercises with curls, shoulder presses, punch, stirring and wrist curls x 20 reps with mod rest breaks due to fatigue. Therapeutic Activity Training: pt completed functional mobility with RW x 75' x 2 with CGA. Pt stood x 3-4 min x 2 attempts with CGA with RW to facilitate increased endurance/strength for ADL tasks and transfers. Safety Measures: Gait belt used, Left in bed, Pull/Bed Alarm activated and call light left in reach        Assessment / Impression:        Patient's tolerance of treatment: Good   Adverse Reaction: None  Significant change in status and impact:  None  Barriers to improvement:  Dec strength and endurance    Plan for Next Session:    Continue with POC.     Time in:  1000  Time out:  1040  Total treatment time:  40  Billed Units: 1 TA, 1 TE, 1 ADL  Electronically signed by:    Janelle Johansen [address]    3/22/2023, 12:40 PM    Previously filed values:  Patient Goals   Patient goals : To go home  Short Term Goals  Time Frame for Short Term Goals: Until DC or goals met  Short Term Goal 1: Pt will complete functional trfs mod I  Short Term Goal 2: Pt will complete UE ADLs mod I  Short Term Goal 3: Pt will complete LE ADLs mod I  Short Term Goal 4: Pt will tolerate >8 min of standing for theract/ADLs w/o increased SOB  Short Term Goal 5: Pt will complete BUE therex to increase strength/endurance for functional mobility/ADLs

## 2023-03-22 NOTE — CARE COORDINATION
SWING BED WEEKLY TEAM SHEET     Lashaun Arauzs   3/22/2023 WEEK # 1    Care Management    Issues to be resolved before discharge: Increased strength/balance/mobility     Family Education: Patient/staff to update     Discharge Plan: Home   Patient/Family Adjustment     Goals of previous week:   [] 1st team   [] met   [] partially met    [x] not met             Why goals were not met, Continued weakness     Skilled Level of Care Remains   [x] yes   [] no    Estimated length of stay: Next insurance review due 3/26/23  Patient/Family Concerns/input: None at this time     Patient/Family  Signature _________________  3/22/2023       Care Management Signature:  Raza Estrada RN

## 2023-03-22 NOTE — FLOWSHEET NOTE
Physical Therapy Daily Treatment Note   Date: 3/22/2023 Room: 008008-01    Name: Raji Monday : 1965   MRN: [id number] Admission Date:3/16/2023      Restrictions/Precautions: fall risk, O2    Initial Pain level: 7/10 in right knee and ankle are achy today    Subjective/Observation: patient sitting in recliner, agreeable to therapy session    Communication with other providers: n/a      Therapeutic Activities/Neuro Re-Education/Gait Training   Date: 3/20/23   Date: 3/21/23 Date: 3/22/23 Date:    STS   Transfer Min A from recliner to RW   SBA from recliner and w/c to [facility] A from recliner  SBA from lower chair x5    Standing Balance   For amb with CGA-SBA For amb with CGA-SBA Airex pad 1x30\" and 1x45\" no UE support feet shoulder width, no LOB      Ambulation 2x80ft with CGA-SBA and w/c follow for safety. AM: 15ft with RW and SBA    90+50+90+90ft with RW, SBA and A for O2 management, and w/c follow for safety.  Extended time for rest required between bouts 80ft x2 with RW and SBA      Stairs   x     x   x      Therapeutic Exercises   Date: 3/20/23   Date: 3/22/23 Date:  Date:   Nustep 7:30 Lvl 3  Time limited d/t fatigue   x     Sciatic nerve glides  1x10 B     LAQ  1x10 B     Seated marches  1x10 B     Standing hip ext  1x10 B     Standing hip abd  1x10 B     Heel raises  1x15         Education provided: continue HEP exercises in recliner and while in bed when able      Treatment/Activity Tolerance:   [x] Patient limited by fatigue   [] Patient limited by pain   [] Patient limited by other medical complications   [x] Patient tolerated therapy well  [] Other:     Safety Precautions:     [] Left in bed    [x] Left in chair   [x] Call light within reach    [] Bed alarm on    [] Personal alarm on    [] Other staff present:  [] Family/Caregiver present:      Post Tx Pain Ratin/10       Evaluation Assessment  3/17/23  Assessment: Patient is a 62 y.o. female who presents to [facility] for the swingbed program to improve strength and endurance to increase independence with bed mobility, transfers, and ambulation. Patient would benefit from continued skilled physical therapy services to address decreased strength, ROM, and endurance, impaired balance, and decline in functional mobility.     Discharge Recommendations:  Continue to assess pending progress, Home with assist PRN, Home with Home health PT        Social/Functional History:  Lives With: Alone  Home Layout: One level  Home Access: Level entry     PT Equipment:  Home Equipment: Oxygen (Pt wears 2L of O2 24/7 and has portable and concentrator tanks)    Goals:         Short Term Goals  Time Frame for Short Term Goals: 1 week  Short Term Goal 1: Pt will perform sit><supine Mod I  Short Term Goal 2: Pt will transfer to all surfaces SBA  Short Term Goal 3: Pt will ambulate 150ft with LRAD Mod I  Short Term Goal 4: Pt will perform standing light dynamic activity x 3 minutes, single UE support if needed SBA         Time In 1435   Time Out 1515   Total Minutes  40   Billed Units 1Gt, 2TE       Signed: Mortimer No, PT, DPT [id number] 3/22/2023, 2:46 PM

## 2023-03-22 NOTE — CARE COORDINATION
PHYSICAL THERAPY   SWING BED WEEKLY TEAM SHEET      Donal Boucher   3/22/2023             WEEK# 1        Ambulation     Distance: 90ft  Device: RW  Assist: SBA    W/C skills    Propulsion: 50ft  W/C parts management: SUP    Stairs    Amount/size: 0  Assist:   Device:   Pain:     Transfers    Sit to stand  []DEP   []Max A   []Mod A   []Min A   [x]CGA   []SUP   []Mod I   []Indep    Stand to sit  []DEP   []Max A   []Mod A   []Min A   [x]CGA   []SUP   []Mod I   []Indep            Bed Mobility    Rolls right  []DEP   []Max A   []Mod A   []Min A   []CGA   [x]SUP   []Mod I   []Indep    Rolls left  []DEP   []Max A   []Mod A   []Min A   []CGA   [x]SUP   []Mod I   []Indep    Positioning  []DEP   []Max A   []Mod A   []Min A   []CGA   [x]SUP   []Mod I   []Indep     Supine to sit   []DEP   []Max A   []Mod A   []Min A   []CGA   [x]SUP   []Mod I   []Indep    Sit to supine  []DEP   []Max A   []Mod A   []Min A   []CGA   [x]SUP   []Mod I   []Indep        Strength/ROM: grossly 4/5       Balance    Static sitting   [] Poor [] Fair- [] Fair  [] Fair+         [x] Good          Dynamic Sitting   [] Poor [] Fair- [] Fair  [] Fair+         [x] Good     Static standing  [] Poor [] Fair- [] Fair  [] Fair+         [x] Good     Dynamic standing  [] Poor [] Fair- [] Fair  [x] Fair+         [] Good                                                 Goals   Short Term Goal 1: Pt will perform sit><supine Mod I   [] Met     [x] Progressing     [] Discontinue     Short Term Goal 2: Pt will transfer to all surfaces SBA   [] Met     [x] Progressing     [] Discontinue     Short Term Goal 3: Pt will ambulate 75ft with LRAD SBA   [x] Met     [] Progressing     [] Discontinue     Short Term Goal 4: Pt will perform standing light dynamic activity x 3 minutes, single UE support if needed SBA   [] Met     [x] Progressing     [] Discontinue        Updated Goals  Short Term Goal 3: Pt will ambulate 150ft with LRAD Mod I       Barriers to Discharge: decreased strength and endurance           Physical Therapy Signature:  Camren Michel, PT, DPT

## 2023-03-22 NOTE — PROGRESS NOTES
Occupational Therapy  SWING BED WEEKLY TEAM SHEET     Lyida Miller   3/22/2023   WEEK # 1    Occupational Therapy    Feeding  [x] Independ [] SBA [] MIN assist  [] mod assist  [] max assist [] tot assist  Grooming [x] Independ [] SBA [] MIN assist  [] mod assist  [] max assist [] tot assist   Bathing upper body [] Independ [x] SBA [] MIN assist  [] mod assist  [] max assist              [] tot assist    Dressing upper body [] Independ [x] SBA [] MIN assist  [] mod assist  [] max assist              [] tot assist    Dressing lower body [] Independ [] SBA [] MIN assist  [] mod assist  [x] max assist              [] tot assist   Toileting [] Independ [x] SBA [] MIN assist  [] mod assist  [] max assist [] tot assist    Home Management: Has help from family    Cognitive/Perception: WFL    Upper extremity motor control: WFL      Goals of previous week:   [] 1st team   [] met   [] partially met    [x] not met             Why goals were not met Dec strength and endurance.      Issues to be resolved before discharge:Inc strength and endurance      Occupational Therapy Signature: Nika PAL/FRANCISCO FANNY [id number]

## 2023-03-22 NOTE — PROGRESS NOTES
Met with patient today for swing bed activities . Offered patient a variety of games for game day today.   Divya Starr

## 2023-03-22 NOTE — PLAN OF CARE
Problem: Safety - Adult  Goal: Free from fall injury  3/21/2023 2153 by Johnny Galeas LPN  Outcome: Progressing  3/21/2023 0857 by Shaniqua Suazo RN  Outcome: Progressing     Problem: Discharge Planning  Goal: Discharge to home or other facility with appropriate resources  3/21/2023 2153 by Johnny Galeas LPN  Outcome: Progressing  3/21/2023 0857 by Shaniqua Suazo RN  Outcome: Progressing     Problem: Skin/Tissue Integrity  Goal: Absence of new skin breakdown  Description: 1. Monitor for areas of redness and/or skin breakdown  2. Assess vascular access sites hourly  3. Every 4-6 hours minimum:  Change oxygen saturation probe site  4. Every 4-6 hours:  If on nasal continuous positive airway pressure, respiratory therapy assess nares and determine need for appliance change or resting period.   3/21/2023 2153 by Johnny Galeas LPN  Outcome: Progressing  3/21/2023 0857 by Shaniqua Suazo RN  Outcome: Progressing     Problem: ABCDS Injury Assessment  Goal: Absence of physical injury  3/21/2023 2153 by Johnny Galeas LPN  Outcome: Progressing  3/21/2023 0857 by Shaniqua Suazo RN  Outcome: Progressing     Problem: Pain  Goal: Verbalizes/displays adequate comfort level or baseline comfort level  3/21/2023 2153 by Johnny Galeas LPN  Outcome: Progressing  3/21/2023 0857 by Shaniqua Suazo RN  Outcome: Progressing

## 2023-03-23 LAB
GLUCOSE BLD-MCNC: 136 MG/DL (ref 70–99)
GLUCOSE BLD-MCNC: 168 MG/DL (ref 70–99)
GLUCOSE BLD-MCNC: 187 MG/DL (ref 70–99)
GLUCOSE BLD-MCNC: 192 MG/DL (ref 70–99)

## 2023-03-23 PROCEDURE — 97110 THERAPEUTIC EXERCISES: CPT

## 2023-03-23 PROCEDURE — 6360000002 HC RX W HCPCS: Performed by: STUDENT IN AN ORGANIZED HEALTH CARE EDUCATION/TRAINING PROGRAM

## 2023-03-23 PROCEDURE — 94761 N-INVAS EAR/PLS OXIMETRY MLT: CPT

## 2023-03-23 PROCEDURE — 97116 GAIT TRAINING THERAPY: CPT

## 2023-03-23 PROCEDURE — 97530 THERAPEUTIC ACTIVITIES: CPT

## 2023-03-23 PROCEDURE — 1200000002 HC SEMI PRIVATE SWING BED

## 2023-03-23 PROCEDURE — 82962 GLUCOSE BLOOD TEST: CPT

## 2023-03-23 PROCEDURE — 97535 SELF CARE MNGMENT TRAINING: CPT

## 2023-03-23 PROCEDURE — 6370000000 HC RX 637 (ALT 250 FOR IP): Performed by: PHYSICIAN ASSISTANT

## 2023-03-23 PROCEDURE — 2700000000 HC OXYGEN THERAPY PER DAY

## 2023-03-23 PROCEDURE — 6370000000 HC RX 637 (ALT 250 FOR IP): Performed by: STUDENT IN AN ORGANIZED HEALTH CARE EDUCATION/TRAINING PROGRAM

## 2023-03-23 RX ADMIN — ENOXAPARIN SODIUM 40 MG: 100 INJECTION SUBCUTANEOUS at 08:21

## 2023-03-23 RX ADMIN — BUMETANIDE 1 MG: 1 TABLET ORAL at 08:21

## 2023-03-23 RX ADMIN — ACETAMINOPHEN 650 MG: 325 TABLET ORAL at 08:20

## 2023-03-23 RX ADMIN — ACETAMINOPHEN 650 MG: 325 TABLET ORAL at 18:18

## 2023-03-23 RX ADMIN — INSULIN LISPRO 10 UNITS: 100 INJECTION, SOLUTION INTRAVENOUS; SUBCUTANEOUS at 08:25

## 2023-03-23 RX ADMIN — INSULIN LISPRO 10 UNITS: 100 INJECTION, SOLUTION INTRAVENOUS; SUBCUTANEOUS at 12:32

## 2023-03-23 RX ADMIN — ENOXAPARIN SODIUM 40 MG: 100 INJECTION SUBCUTANEOUS at 20:00

## 2023-03-23 RX ADMIN — INSULIN LISPRO 10 UNITS: 100 INJECTION, SOLUTION INTRAVENOUS; SUBCUTANEOUS at 18:09

## 2023-03-23 RX ADMIN — INSULIN GLARGINE 30 UNITS: 100 INJECTION, SOLUTION SUBCUTANEOUS at 20:01

## 2023-03-23 RX ADMIN — CARVEDILOL 6.25 MG: 6.25 TABLET, FILM COATED ORAL at 18:15

## 2023-03-23 RX ADMIN — CYANOCOBALAMIN TAB 1000 MCG 1000 MCG: 1000 TAB at 08:20

## 2023-03-23 RX ADMIN — ATORVASTATIN CALCIUM 20 MG: 20 TABLET, FILM COATED ORAL at 18:15

## 2023-03-23 RX ADMIN — PANTOPRAZOLE SODIUM 40 MG: 40 TABLET, DELAYED RELEASE ORAL at 05:26

## 2023-03-23 RX ADMIN — CARVEDILOL 6.25 MG: 6.25 TABLET, FILM COATED ORAL at 08:21

## 2023-03-23 ASSESSMENT — PAIN DESCRIPTION - DESCRIPTORS
DESCRIPTORS: ACHING
DESCRIPTORS: ACHING

## 2023-03-23 ASSESSMENT — PAIN DESCRIPTION - LOCATION
LOCATION: LEG
LOCATION: LEG

## 2023-03-23 ASSESSMENT — PAIN DESCRIPTION - ORIENTATION
ORIENTATION: RIGHT;LEFT
ORIENTATION: RIGHT;LEFT

## 2023-03-23 ASSESSMENT — PAIN DESCRIPTION - FREQUENCY
FREQUENCY: CONTINUOUS
FREQUENCY: CONTINUOUS

## 2023-03-23 ASSESSMENT — PAIN SCALES - GENERAL
PAINLEVEL_OUTOF10: 2
PAINLEVEL_OUTOF10: 5
PAINLEVEL_OUTOF10: 5
PAINLEVEL_OUTOF10: 0
PAINLEVEL_OUTOF10: 3

## 2023-03-23 ASSESSMENT — PAIN DESCRIPTION - ONSET
ONSET: GRADUAL
ONSET: GRADUAL

## 2023-03-23 ASSESSMENT — PAIN DESCRIPTION - PAIN TYPE
TYPE: CHRONIC PAIN
TYPE: CHRONIC PAIN

## 2023-03-23 ASSESSMENT — PAIN DESCRIPTION - DIRECTION
RADIATING_TOWARDS: NO
RADIATING_TOWARDS: NO

## 2023-03-23 NOTE — PROGRESS NOTES
Occupational Therapy    Occupational Therapy Treatment Note  Name: Alberto Angel MRN: [id number] :   1965   Date:  3/23/2023   Admission Date: 3/16/2023 Room:  [address] [place] [address]   Restrictions/Precautions:  Restrictions/Precautions  Restrictions/Precautions: Fall Risk, General Precautions     Communication with other providers:   Cleared for treatment by RN. Subjective:  Patient states:  \"I feel Okay today, I guess\"  Pain:   Location, Type, Intensity (0/10 to 10/10):  6/10 after moving around during session in my knees. Objective:    Observation:  pt alert and oriented      Treatment, including education:  Transfers    Sit to stand :SBA  Stand to sit :SBA  SPT:SBA  Toilet:SBA    Self Care Training:   Activities performed today included the following: Toileting  Sup with toilet hygiene and clothing management. Therapeutic Exercise:  Pt completed UE exercises to increase strength and ROM to facilitate increase for ADLs and functional mobility. Pt completed B UEs with 3# dumbbell exercises with curls, shoulder presses, punch, stirring and wrist curls x 20 reps with mod rest breaks due to fatigue. Therapeutic Activity Training: pt completed functional mobility with RW x 75' x 2 with CGA. Pt stood x 3-4 min x 2 attempts with CGA with RW to facilitate increased endurance/strength for ADL tasks and transfers. Safety Measures: Gait belt used, Left in bed, Pull/Bed Alarm activated and call light left in reach        Assessment / Impression:        Patient's tolerance of treatment: Good   Adverse Reaction: None  Significant change in status and impact:  None  Barriers to improvement:  Dec strength and endurance    Plan for Next Session:    Continue with POC.     Time in:  40  Time out:  1025  Total treatment time:  45  Billed Units: 1 TA, 1 TE, 1 ADL  Electronically signed by:    Gracia Umaña, [address]    3/23/2023, 3:19 PM    Previously filed values:  Patient Goals   Patient goals : To go home  Short Term Goals  Time Frame for Short Term Goals: Until DC or goals met  Short Term Goal 1: Pt will complete functional trfs mod I  Short Term Goal 2: Pt will complete UE ADLs mod I  Short Term Goal 3: Pt will complete LE ADLs mod I  Short Term Goal 4: Pt will tolerate >8 min of standing for theract/ADLs w/o increased SOB  Short Term Goal 5: Pt will complete BUE therex to increase strength/endurance for functional mobility/ADLs

## 2023-03-23 NOTE — PROGRESS NOTES
Met with patient today for swing bed activities. Provided patient with [employer] fun facts page for activity today.   Maria T Macias

## 2023-03-23 NOTE — PROGRESS NOTES
Hospitalist Progress Note      Name:  Lashaun Lopez /Age/Sex: 1965  (62 y.o. female)   MRN & CSN:  [id number] & [id number] Admission Date/Time: 3/16/2023  1:47 PM   Location:   PCP: No primary care provider on file. Hospital Day: 8    Assessment and Plan:   Lashaun Lopez is a 62 y.o.  female  who presents with Physical debility    Physical Debility: Continue PT/OT daily  Hypokalemia: 3.7 on 3/20; will recheck in am, Continue PO potassium supplementation ordered  Chronic Hypercapnic/Hypoxemic Respiratory Failure: Continue home bronchodilators, is on 2L at baseline, will continue  Hx COPD: On 2L chronically, does not appear she is on long acting bronchodilators,  albuterol MDI ordered PRN  Hx Recent MSSA Bacteremia and Endophthalmitis: Continue Dicloxacillin  Hx HFpEF: Continue Bumex  Hx Mod/Severe TR, Likely Pulmonary HTN: Continue Bumex  Hx HTN: Continue Coreg  Hx Insulin Dependent Diabetes: Last HgbA1C 23 9.5; Continue Lantus, scheduled prandial Humalog, SSI  Hx AOCD/B12 Deficiency: Continue B12 supplements  Hx HLD: Continue Atorvastatin    Diet ADULT DIET; Regular; 5 carb choices (75 gm/meal)   DVT Prophylaxis [x] Lovenox, []  Heparin, [] SCDs, [] Ambulation   GI Prophylaxis [x] PPI,  [] H2 Blocker,  [] Carafate,  [] Diet/Tube Feeds   Code Status Full Code   Disposition Patient requires continued admission due to      History of Present Illness:     Patient is resting comfortably in bed this morning with no complaints. Overall has been doing well working with therapy. Objective: Intake/Output Summary (Last 24 hours) at 3/23/2023 0804  Last data filed at 3/22/2023 1757  Gross per 24 hour   Intake 720 ml   Output --   Net 720 ml        Vitals:   Vitals:    23 0803   BP:    Pulse:    Resp:    Temp:    SpO2: 96%     Physical Exam:   GEN Awake female, sitting upright in bed in no apparent distress. Appears given age. EYES Pupils are equally round.   No scleral erythema,

## 2023-03-23 NOTE — CARE COORDINATION
CM met with the patient for follow-up discussion regarding discharge planning. CM advised the patient that her next insurance review is due 3/26, patient voiced understanding. Patient feels she would like to try to get an extension to her stay in the swing bed program.  CM will follow.

## 2023-03-23 NOTE — FLOWSHEET NOTE
within reach    [] Bed alarm on    [] Personal alarm on    [] Other staff present:  [] Family/Caregiver present:      Post Tx Pain Ratin/10       Evaluation Assessment  3/17/23  Assessment: Patient is a 62 y.o. female who presents to [facility] for the swingbed program to improve strength and endurance to increase independence with bed mobility, transfers, and ambulation. Patient would benefit from continued skilled physical therapy services to address decreased strength, ROM, and endurance, impaired balance, and decline in functional mobility.     Discharge Recommendations:  Continue to assess pending progress, Home with assist PRN, Home with Home health PT        Social/Functional History:  Lives With: Alone  Home Layout: One level  Home Access: Level entry     PT Equipment:  Home Equipment: Oxygen (Pt wears 2L of O2  and has portable and concentrator tanks)    Goals:         Short Term Goals  Time Frame for Short Term Goals: 1 week  Short Term Goal 1: Pt will perform sit><supine Mod I  Short Term Goal 2: Pt will transfer to all surfaces SBA  Short Term Goal 3: Pt will ambulate 150ft with LRAD Mod I  Short Term Goal 4: Pt will perform standing light dynamic activity x 3 minutes, single UE support if needed SBA         Time In 1435   Time Out 1530   Total Minutes  55   Billed Units 2Gt, 2TE       Signed: Tina Osborne PT, DPT [id number] 3/23/2023, 3:46 PM

## 2023-03-24 LAB
ANION GAP SERPL CALCULATED.3IONS-SCNC: 11 MMOL/L (ref 4–16)
BUN SERPL-MCNC: 10 MG/DL (ref 6–23)
CALCIUM SERPL-MCNC: 8.5 MG/DL (ref 8.3–10.6)
CHLORIDE BLD-SCNC: 101 MMOL/L (ref 99–110)
CO2: 31 MMOL/L (ref 21–32)
CREAT SERPL-MCNC: 0.9 MG/DL (ref 0.6–1.1)
GFR SERPL CREATININE-BSD FRML MDRD: >60 ML/MIN/1.73M2
GLUCOSE BLD-MCNC: 119 MG/DL (ref 70–99)
GLUCOSE BLD-MCNC: 129 MG/DL (ref 70–99)
GLUCOSE BLD-MCNC: 146 MG/DL (ref 70–99)
GLUCOSE BLD-MCNC: 166 MG/DL (ref 70–99)
GLUCOSE BLD-MCNC: 216 MG/DL (ref 70–99)
GLUCOSE SERPL-MCNC: 140 MG/DL (ref 70–99)
MAGNESIUM: 1.8 MG/DL (ref 1.8–2.4)
POTASSIUM SERPL-SCNC: 3.5 MMOL/L (ref 3.5–5.1)
SODIUM BLD-SCNC: 143 MMOL/L (ref 135–145)

## 2023-03-24 PROCEDURE — 80048 BASIC METABOLIC PNL TOTAL CA: CPT

## 2023-03-24 PROCEDURE — 97110 THERAPEUTIC EXERCISES: CPT

## 2023-03-24 PROCEDURE — 2700000000 HC OXYGEN THERAPY PER DAY

## 2023-03-24 PROCEDURE — 82962 GLUCOSE BLOOD TEST: CPT

## 2023-03-24 PROCEDURE — 6360000002 HC RX W HCPCS: Performed by: STUDENT IN AN ORGANIZED HEALTH CARE EDUCATION/TRAINING PROGRAM

## 2023-03-24 PROCEDURE — 83735 ASSAY OF MAGNESIUM: CPT

## 2023-03-24 PROCEDURE — 6370000000 HC RX 637 (ALT 250 FOR IP): Performed by: PHYSICIAN ASSISTANT

## 2023-03-24 PROCEDURE — 94761 N-INVAS EAR/PLS OXIMETRY MLT: CPT

## 2023-03-24 PROCEDURE — 1200000002 HC SEMI PRIVATE SWING BED

## 2023-03-24 PROCEDURE — 6370000000 HC RX 637 (ALT 250 FOR IP): Performed by: STUDENT IN AN ORGANIZED HEALTH CARE EDUCATION/TRAINING PROGRAM

## 2023-03-24 PROCEDURE — 97530 THERAPEUTIC ACTIVITIES: CPT

## 2023-03-24 RX ADMIN — INSULIN GLARGINE 30 UNITS: 100 INJECTION, SOLUTION SUBCUTANEOUS at 20:06

## 2023-03-24 RX ADMIN — BUMETANIDE 1 MG: 1 TABLET ORAL at 08:37

## 2023-03-24 RX ADMIN — CARVEDILOL 6.25 MG: 6.25 TABLET, FILM COATED ORAL at 17:24

## 2023-03-24 RX ADMIN — ACETAMINOPHEN 650 MG: 325 TABLET ORAL at 08:57

## 2023-03-24 RX ADMIN — CYANOCOBALAMIN TAB 1000 MCG 1000 MCG: 1000 TAB at 08:36

## 2023-03-24 RX ADMIN — ENOXAPARIN SODIUM 40 MG: 100 INJECTION SUBCUTANEOUS at 20:05

## 2023-03-24 RX ADMIN — PANTOPRAZOLE SODIUM 40 MG: 40 TABLET, DELAYED RELEASE ORAL at 06:37

## 2023-03-24 RX ADMIN — ACETAMINOPHEN 650 MG: 325 TABLET ORAL at 16:40

## 2023-03-24 RX ADMIN — INSULIN LISPRO 10 UNITS: 100 INJECTION, SOLUTION INTRAVENOUS; SUBCUTANEOUS at 13:03

## 2023-03-24 RX ADMIN — ATORVASTATIN CALCIUM 20 MG: 20 TABLET, FILM COATED ORAL at 17:24

## 2023-03-24 RX ADMIN — ENOXAPARIN SODIUM 40 MG: 100 INJECTION SUBCUTANEOUS at 08:40

## 2023-03-24 RX ADMIN — CARVEDILOL 6.25 MG: 6.25 TABLET, FILM COATED ORAL at 08:38

## 2023-03-24 RX ADMIN — INSULIN LISPRO 10 UNITS: 100 INJECTION, SOLUTION INTRAVENOUS; SUBCUTANEOUS at 17:25

## 2023-03-24 RX ADMIN — INSULIN LISPRO 10 UNITS: 100 INJECTION, SOLUTION INTRAVENOUS; SUBCUTANEOUS at 08:41

## 2023-03-24 ASSESSMENT — PAIN SCALES - GENERAL
PAINLEVEL_OUTOF10: 2
PAINLEVEL_OUTOF10: 5
PAINLEVEL_OUTOF10: 2
PAINLEVEL_OUTOF10: 4
PAINLEVEL_OUTOF10: 4

## 2023-03-24 ASSESSMENT — PAIN DESCRIPTION - LOCATION
LOCATION: LEG
LOCATION: HEAD

## 2023-03-24 ASSESSMENT — PAIN DESCRIPTION - DIRECTION: RADIATING_TOWARDS: NO

## 2023-03-24 ASSESSMENT — PAIN DESCRIPTION - PAIN TYPE
TYPE: CHRONIC PAIN
TYPE: ACUTE PAIN
TYPE: ACUTE PAIN

## 2023-03-24 ASSESSMENT — PAIN - FUNCTIONAL ASSESSMENT
PAIN_FUNCTIONAL_ASSESSMENT: ACTIVITIES ARE NOT PREVENTED

## 2023-03-24 ASSESSMENT — PAIN DESCRIPTION - FREQUENCY
FREQUENCY: CONTINUOUS
FREQUENCY: CONTINUOUS

## 2023-03-24 ASSESSMENT — PAIN DESCRIPTION - ORIENTATION: ORIENTATION: RIGHT;LEFT

## 2023-03-24 ASSESSMENT — PAIN DESCRIPTION - DESCRIPTORS
DESCRIPTORS: ACHING

## 2023-03-24 ASSESSMENT — PAIN DESCRIPTION - ONSET
ONSET: GRADUAL
ONSET: GRADUAL

## 2023-03-24 NOTE — PROGRESS NOTES
Met with patient today for swing bed activities. Patient participated in conversation about spring and the fruits and veggies that begin to grow for spring fruit day.   Alma Ruiz

## 2023-03-24 NOTE — PROGRESS NOTES
SWING BED WEEKLY TEAM SHEET     WEEK#   2    NUTRITION   Diet:    Diabetic (5 carbs/meal)                 TF:     n/a     TPN:   n/a    Appropriate/Adequate [X] yes [ ] no     Meal intakes: Consistently 50% or more at meals. Pt reports a good appetite. Weight: No new weights since admission. BMI:  57.88  No s/s malnutrition. Significant Change: None noted    Recommendations: Continue to provide current diet of diabetic, continue to document po intakes for accurate assessment of nutritional status, monitor po intakes, labs, wts, POC. Issues to be resolved before discharge: Continue with po intake of 50% or more consistently at meals.      Dietitian Signature:   Chad Wyatt RD, LD  [phone]

## 2023-03-24 NOTE — FLOWSHEET NOTE
Physical Therapy Daily Treatment Note   Date: 3/24/2023 Room: [address] [place] [address]    Name: Alberto Angel : 1965   MRN: [id number] Admission Date:3/16/2023        Education provided: Patient given handout of HEP exercises including supine and seated exercises to continue activity throughout the evenings and on . Patient is receptive and agreeable to performing HEP. Discussed discharge plans and patient states she will talk to family over the weekend. PT and patient agree to set a tentative discharge date soon. Patient will require a bariatric rolling walker at discharge in order to be safe with ambulation and transfers within the home and community.       Safety Precautions:     [] Left in bed    [x] Left in chair   [x] Call light within reach    [] Bed alarm on    [] Personal alarm on    [] Other staff present:  [] Family/Caregiver present:          Time In 1535   Time Out 1550   Total Minutes  15   Billed Units 1TE       Signed: Marcos Sanchez, PT, DPT [id number] 3/24/2023, 3:52 PM

## 2023-03-24 NOTE — PROGRESS NOTES
Occupational Therapy    Occupational Therapy Treatment Note  Name: Tawny Reed MRN: [id number] :   1965   Date:  3/24/2023   Admission Date: 3/16/2023 Room:  [address] [place] [address]   Restrictions/Precautions:  Restrictions/Precautions  Restrictions/Precautions: Fall Risk, General Precautions     Communication with other providers:   Cleared for treatment by RN. Subjective:  Patient states:  \"I'm a little dizzy\"  Pain:   Location, Type, Intensity (0/10 to 10/10):  5/10 B knees. Objective:    Observation:  pt alert and oriented      Treatment, including education:  Transfers    Sit to stand :SBA  Stand to sit :SBA  SPT:SBA  Toilet:SBA    Self Care Training:   Activities performed today included the following: Toileting  Sup with toilet hygiene and clothing management. Therapeutic Exercise:  Pt completed UE exercises to increase strength and ROM to facilitate increase for ADLs and functional mobility. Pt completed B UEs with 3# dumbbell exercises with curls, shoulder presses, punch, stirring and wrist curls x 20 reps with mod rest breaks due to fatigue. Therapeutic Activity Training: pt completed functional mobility with RW x 100', 75'x 2 with CGA. Pt stood x 3-4 min x 2 attempts with CGA with RW to facilitate increased endurance/strength for ADL tasks and transfers. Pt required mod rest break due to getting dizzy when completing functional mobility with RW and after a short rest breaks pt felt better. Safety Measures: Gait belt used, up in recliner Pull/Bed Alarm activated and call light left in reach        Assessment / Impression:        Patient's tolerance of treatment: Good   Adverse Reaction: None  Significant change in status and impact:  None  Barriers to improvement:  Dec strength and endurance    Plan for Next Session:    Continue with POC.     Time in:  945  Time out:  1027  Total treatment time:  42  Billed Units: 2 TA, 1 TE,   Electronically signed by:    CHARLINE Oropeza FANNY [id number]    3/24/2023, 11:39 AM    Previously filed values:  Patient Goals   Patient goals :  To go home  Short Term Goals  Time Frame for Short Term Goals: Until DC or goals met  Short Term Goal 1: Pt will complete functional trfs mod I  Short Term Goal 2: Pt will complete UE ADLs mod I  Short Term Goal 3: Pt will complete LE ADLs mod I  Short Term Goal 4: Pt will tolerate >8 min of standing for theract/ADLs w/o increased SOB  Short Term Goal 5: Pt will complete BUE therex to increase strength/endurance for functional mobility/ADLs

## 2023-03-25 LAB
GLUCOSE BLD-MCNC: 141 MG/DL (ref 70–99)
GLUCOSE BLD-MCNC: 150 MG/DL (ref 70–99)
GLUCOSE BLD-MCNC: 153 MG/DL (ref 70–99)
GLUCOSE BLD-MCNC: 212 MG/DL (ref 70–99)

## 2023-03-25 PROCEDURE — 6360000002 HC RX W HCPCS: Performed by: STUDENT IN AN ORGANIZED HEALTH CARE EDUCATION/TRAINING PROGRAM

## 2023-03-25 PROCEDURE — 6370000000 HC RX 637 (ALT 250 FOR IP): Performed by: STUDENT IN AN ORGANIZED HEALTH CARE EDUCATION/TRAINING PROGRAM

## 2023-03-25 PROCEDURE — 94761 N-INVAS EAR/PLS OXIMETRY MLT: CPT

## 2023-03-25 PROCEDURE — 6370000000 HC RX 637 (ALT 250 FOR IP): Performed by: PHYSICIAN ASSISTANT

## 2023-03-25 PROCEDURE — 97110 THERAPEUTIC EXERCISES: CPT

## 2023-03-25 PROCEDURE — 82962 GLUCOSE BLOOD TEST: CPT

## 2023-03-25 PROCEDURE — 1200000002 HC SEMI PRIVATE SWING BED

## 2023-03-25 PROCEDURE — 97530 THERAPEUTIC ACTIVITIES: CPT

## 2023-03-25 RX ADMIN — INSULIN LISPRO 10 UNITS: 100 INJECTION, SOLUTION INTRAVENOUS; SUBCUTANEOUS at 12:24

## 2023-03-25 RX ADMIN — INSULIN GLARGINE 30 UNITS: 100 INJECTION, SOLUTION SUBCUTANEOUS at 19:57

## 2023-03-25 RX ADMIN — CARVEDILOL 6.25 MG: 6.25 TABLET, FILM COATED ORAL at 08:26

## 2023-03-25 RX ADMIN — ACETAMINOPHEN 650 MG: 325 TABLET ORAL at 19:58

## 2023-03-25 RX ADMIN — ENOXAPARIN SODIUM 40 MG: 100 INJECTION SUBCUTANEOUS at 20:01

## 2023-03-25 RX ADMIN — INSULIN LISPRO 10 UNITS: 100 INJECTION, SOLUTION INTRAVENOUS; SUBCUTANEOUS at 17:44

## 2023-03-25 RX ADMIN — ACETAMINOPHEN 650 MG: 325 TABLET ORAL at 06:07

## 2023-03-25 RX ADMIN — CYANOCOBALAMIN TAB 1000 MCG 1000 MCG: 1000 TAB at 08:26

## 2023-03-25 RX ADMIN — ENOXAPARIN SODIUM 40 MG: 100 INJECTION SUBCUTANEOUS at 08:28

## 2023-03-25 RX ADMIN — PANTOPRAZOLE SODIUM 40 MG: 40 TABLET, DELAYED RELEASE ORAL at 04:45

## 2023-03-25 RX ADMIN — BUMETANIDE 1 MG: 1 TABLET ORAL at 08:26

## 2023-03-25 RX ADMIN — CARVEDILOL 6.25 MG: 6.25 TABLET, FILM COATED ORAL at 17:44

## 2023-03-25 RX ADMIN — INSULIN LISPRO 10 UNITS: 100 INJECTION, SOLUTION INTRAVENOUS; SUBCUTANEOUS at 08:28

## 2023-03-25 RX ADMIN — ATORVASTATIN CALCIUM 20 MG: 20 TABLET, FILM COATED ORAL at 17:44

## 2023-03-25 ASSESSMENT — PAIN DESCRIPTION - LOCATION
LOCATION: HEAD
LOCATION: LEG

## 2023-03-25 ASSESSMENT — PAIN DESCRIPTION - DESCRIPTORS
DESCRIPTORS: ACHING
DESCRIPTORS: ACHING

## 2023-03-25 ASSESSMENT — PAIN DESCRIPTION - ORIENTATION
ORIENTATION: ANTERIOR
ORIENTATION: RIGHT

## 2023-03-25 ASSESSMENT — PAIN DESCRIPTION - ONSET
ONSET: GRADUAL
ONSET: GRADUAL

## 2023-03-25 ASSESSMENT — PAIN DESCRIPTION - FREQUENCY
FREQUENCY: INTERMITTENT
FREQUENCY: INTERMITTENT

## 2023-03-25 ASSESSMENT — PAIN DESCRIPTION - PAIN TYPE
TYPE: ACUTE PAIN
TYPE: CHRONIC PAIN

## 2023-03-25 ASSESSMENT — PAIN SCALES - GENERAL
PAINLEVEL_OUTOF10: 0
PAINLEVEL_OUTOF10: 3
PAINLEVEL_OUTOF10: 0

## 2023-03-25 NOTE — PROGRESS NOTES
Met with patient today for swing bed activities. Met with patient for self care Saturday and provided tools and education about engaging in self care regularly.   Radha Valente

## 2023-03-25 NOTE — FLOWSHEET NOTE
Physical Therapy Daily Treatment Note   Date: 3/25/2023 Room: [address] [place] [address]    Name: Ramila Sheehan : 1965   MRN: [id number] Admission Date:3/16/2023      Restrictions/Precautions: fall risk, O2    Initial Pain level: 0/10   Subjective/Observation: patient sitting in recliner, agreeable to therapy session    Communication with other providers: n/a      Therapeutic Activities/Neuro Re-Education/Gait Training   Date: 3/20/23   Date: 3/21/23 Date: 3/22/23 Date: 3/23/23 3/25/23   STS   Transfer Min A from recliner to RW   SBA from recliner and w/c to RW Min A from recliner  SBA from lower chair x5 SBA from recliner, nustep, and w/c, all to RW  BA from recliner, nustep, and w/c, all to RW   Standing Balance   For amb with CGA-SBA For amb with CGA-SBA Airex pad 1x30\" and 1x45\" no UE support feet shoulder width, no LOB For amb with SBA      Ambulation 2x80ft with CGA-SBA and w/c follow for safety. AM: 15ft with RW and SBA    90+50+90+90ft with RW, SBA and A for O2 management, and w/c follow for safety. Extended time for rest required between bouts 80ft x2 with RW and SBA 80 + 110 + 60ft with RW and SBA.  Slight forward flexed posture with increasing fatigue 110 ft x2 withRW SBA     Stairs   x     x   x   x      Therapeutic Exercises   Date: 3/20/23   Date: 3/22/23 Date: 3/23/23 Date:3/2523   Nustep 7:30 Lvl 3  Time limited d/t fatigue   x 12:00 Lvl 2  586 steps  Pt able to hold conversation during activity this date, demonstrating improved endurance 10' L2   Sciatic nerve glides  1x10 B     LAQ  1x10 B     Seated marches  1x10 B     Standing hip ext  1x10 B     Standing hip abd  1x10 B     Heel raises  1x15         Education provided: continue HEP exercises in recliner and while in bed when able      Treatment/Activity Tolerance:   [] Patient limited by fatigue   [] Patient limited by pain   [] Patient limited by other medical complications   [x] Patient tolerated therapy well  [] Other:     Safety Precautions:     [] Left in bed    [x] Left in chair   [x] Call light within reach    [] Bed alarm on    [] Personal alarm on    [] Other staff present:  [] Family/Caregiver present:      Post Tx Pain Ratin/10       Evaluation Assessment  3/17/23  Assessment: Patient is a 62 y.o. female who presents to [facility] for the swingbed program to improve strength and endurance to increase independence with bed mobility, transfers, and ambulation. Patient would benefit from continued skilled physical therapy services to address decreased strength, ROM, and endurance, impaired balance, and decline in functional mobility.     Discharge Recommendations:  Continue to assess pending progress, Home with assist PRN, Home with Home health PT        Social/Functional History:  Lives With: Alone  Home Layout: One level  Home Access: Level entry     PT Equipment:  Home Equipment: Oxygen (Pt wears 2L of O2  and has portable and concentrator tanks)    Goals:         Short Term Goals  Time Frame for Short Term Goals: 1 week  Short Term Goal 1: Pt will perform sit><supine Mod I  Short Term Goal 2: Pt will transfer to all surfaces SBA  Short Term Goal 3: Pt will ambulate 150ft with LRAD Mod I  Short Term Goal 4: Pt will perform standing light dynamic activity x 3 minutes, single UE support if needed SBA         Time In 945   Time Out 1010   Total Minutes 25   Billed Units 1 TA, 1 TE       Signed:MEGHAN Baldwin, PT, 3/25/2023,  11:40 AM

## 2023-03-25 NOTE — PROGRESS NOTES
Hospitalist Progress Note      Name:  Kiki Martinez /Age/Sex: 1965  (62 y.o. female)   MRN & CSN:  [id number] & [id number] Admission Date/Time: 3/16/2023  1:47 PM   Location:  008 PCP: No primary care provider on file. Hospital Day: 10    Assessment and Plan:   Kiki Martinez is a 62 y.o.  female  who presents with Physical debility    Physical Debility: Admitted to swing bed program for rehab  Continue PT/OT daily    Hypokalemia: 3.7 on 3/20; will recheck in am, Continue PO potassium supplementation ordered    Chronic Hypercapnic/Hypoxemic Respiratory Failure: 2/2 COPD  Continue regular/pulmonary hygiene  2 L/min nasal cannula at baseline,     Recent MSSA Bacteremia and Endophthalmitis: Completed dicloxacillin 3/18/2023    HFpEF:   VHD-Mod/Severe TR, Likely Pulmonary HTN:   Does not appear acutely decompensated  Continue Bumex   Continue to monitor volume status    HTN: Continue Coreg     Trends appear stable  Insulin Dependent Diabetes: Last HgbA1C 23 9.5; Continue Lantus, scheduled prandial Humalog, SSI    Chronic anemia/B12 Deficiency:   Recent blood transfusion during previous admission    HLD: Continue Atorvastatin    Obesity- Body mass index is 57.88 kg/m². Lifestyle modifications needed      Diet ADULT DIET; Regular; 5 carb choices (75 gm/meal)   DVT Prophylaxis [x] Lovenox, []  Heparin, [] SCDs, [] Ambulation   GI Prophylaxis [x] PPI,  [] H2 Blocker,  [] Carafate,  [] Diet/Tube Feeds   Code Status Full Code   Disposition Patient requires continued admission due to      History of Present Illness:     Patient seen and examined. Sitting in chair at bedside. No significant overnight events reported per nursing. Patient denies new complaints states therapy is going well. Objective:      Intake/Output Summary (Last 24 hours) at 3/25/2023 1056  Last data filed at 3/25/2023 0932  Gross per 24 hour   Intake 720 ml   Output --   Net 720 ml        Vitals:   Vitals:    23 9616 BP: (!) 140/74   Pulse: 77   Resp:    Temp:    SpO2:      Physical Exam:   GEN Awake female, sitting upright in bed in no apparent distress. Appears given age. EYES Pupils are equally round. No scleral erythema, discharge, or conjunctivitis. NECK Supple, no apparent thyromegaly or masses. RESP Symmetric chest movement while on 2L NC  HEME/LYMPH No petechiae or ecchymoses. MSK No gross joint deformities. SKIN Normal coloration, warm, dry. NEURO Cranial nerves appear grossly intact, normal speech  PSYCH Awake, alert, oriented x 4. Affect appropriate.     Medications:   Medications:    insulin lispro  10 Units SubCUTAneous TID WC    insulin glargine  30 Units SubCUTAneous Nightly    insulin lispro  0-4 Units SubCUTAneous TID WC    insulin lispro  0-4 Units SubCUTAneous Nightly    carvedilol  6.25 mg Oral BID WC    atorvastatin  20 mg Oral QPM    cyanocobalamin  1,000 mcg Oral Daily    bumetanide  1 mg Oral Daily    enoxaparin  40 mg SubCUTAneous BID    pantoprazole  40 mg Oral QAM AC      Infusions:   PRN Meds: albuterol sulfate HFA, 2 puff, Q6H PRN  glucose, 4 tablet, PRN  glucagon (rDNA), 1 mg, PRN  ondansetron, 4 mg, Q8H PRN   Or  ondansetron, 4 mg, Q6H PRN  polyethylene glycol, 17 g, Daily PRN  acetaminophen, 650 mg, Q6H PRN   Or  acetaminophen, 650 mg, Q6H PRN        Electronically signed by MASON Burger CNP on 3/25/2023 at 10:56 AM

## 2023-03-25 NOTE — PLAN OF CARE
Problem: Safety - Adult  Goal: Free from fall injury  3/25/2023 0942 by Elías Ho RN  Outcome: Progressing  3/24/2023 2144 by Karyn Singh RN  Outcome: Progressing     Problem: Discharge Planning  Goal: Discharge to home or other facility with appropriate resources  3/25/2023 0942 by Elías Ho RN  Outcome: Progressing  3/24/2023 2144 by Karyn Singh RN  Outcome: Progressing     Problem: Skin/Tissue Integrity  Goal: Absence of new skin breakdown  Description: 1. Monitor for areas of redness and/or skin breakdown  2. Assess vascular access sites hourly  3. Every 4-6 hours minimum:  Change oxygen saturation probe site  4. Every 4-6 hours:  If on nasal continuous positive airway pressure, respiratory therapy assess nares and determine need for appliance change or resting period.   3/25/2023 6913 by Elías Ho RN  Outcome: Progressing  3/24/2023 2144 by Karyn Singh RN  Outcome: Progressing     Problem: ABCDS Injury Assessment  Goal: Absence of physical injury  3/25/2023 0942 by Elías Ho RN  Outcome: Progressing  3/24/2023 2144 by Karyn Singh RN  Outcome: Progressing     Problem: Pain  Goal: Verbalizes/displays adequate comfort level or baseline comfort level  3/25/2023 0942 by Elías Ho RN  Outcome: Progressing  3/24/2023 2144 by Karyn Singh RN  Outcome: Progressing

## 2023-03-26 LAB
GLUCOSE BLD-MCNC: 125 MG/DL (ref 70–99)
GLUCOSE BLD-MCNC: 126 MG/DL (ref 70–99)
GLUCOSE BLD-MCNC: 146 MG/DL (ref 70–99)
GLUCOSE BLD-MCNC: 183 MG/DL (ref 70–99)

## 2023-03-26 PROCEDURE — 6370000000 HC RX 637 (ALT 250 FOR IP): Performed by: STUDENT IN AN ORGANIZED HEALTH CARE EDUCATION/TRAINING PROGRAM

## 2023-03-26 PROCEDURE — 6370000000 HC RX 637 (ALT 250 FOR IP): Performed by: PHYSICIAN ASSISTANT

## 2023-03-26 PROCEDURE — 6360000002 HC RX W HCPCS: Performed by: STUDENT IN AN ORGANIZED HEALTH CARE EDUCATION/TRAINING PROGRAM

## 2023-03-26 PROCEDURE — 6370000000 HC RX 637 (ALT 250 FOR IP): Performed by: NURSE PRACTITIONER

## 2023-03-26 PROCEDURE — 82962 GLUCOSE BLOOD TEST: CPT

## 2023-03-26 PROCEDURE — 1200000002 HC SEMI PRIVATE SWING BED

## 2023-03-26 RX ADMIN — CARVEDILOL 6.25 MG: 6.25 TABLET, FILM COATED ORAL at 08:06

## 2023-03-26 RX ADMIN — INSULIN LISPRO 10 UNITS: 100 INJECTION, SOLUTION INTRAVENOUS; SUBCUTANEOUS at 08:07

## 2023-03-26 RX ADMIN — ENOXAPARIN SODIUM 40 MG: 100 INJECTION SUBCUTANEOUS at 20:53

## 2023-03-26 RX ADMIN — INSULIN LISPRO 10 UNITS: 100 INJECTION, SOLUTION INTRAVENOUS; SUBCUTANEOUS at 12:28

## 2023-03-26 RX ADMIN — ACETAMINOPHEN 650 MG: 325 TABLET ORAL at 08:44

## 2023-03-26 RX ADMIN — ACETAMINOPHEN 650 MG: 325 TABLET ORAL at 17:21

## 2023-03-26 RX ADMIN — ATORVASTATIN CALCIUM 20 MG: 20 TABLET, FILM COATED ORAL at 17:14

## 2023-03-26 RX ADMIN — BUMETANIDE 1 MG: 1 TABLET ORAL at 08:06

## 2023-03-26 RX ADMIN — PANTOPRAZOLE SODIUM 40 MG: 40 TABLET, DELAYED RELEASE ORAL at 04:40

## 2023-03-26 RX ADMIN — INSULIN LISPRO 10 UNITS: 100 INJECTION, SOLUTION INTRAVENOUS; SUBCUTANEOUS at 17:14

## 2023-03-26 RX ADMIN — INSULIN GLARGINE 30 UNITS: 100 INJECTION, SOLUTION SUBCUTANEOUS at 20:53

## 2023-03-26 RX ADMIN — CYANOCOBALAMIN TAB 1000 MCG 1000 MCG: 1000 TAB at 08:06

## 2023-03-26 RX ADMIN — CARVEDILOL 6.25 MG: 6.25 TABLET, FILM COATED ORAL at 17:14

## 2023-03-26 RX ADMIN — ENOXAPARIN SODIUM 40 MG: 100 INJECTION SUBCUTANEOUS at 08:06

## 2023-03-26 ASSESSMENT — PAIN DESCRIPTION - ORIENTATION
ORIENTATION: ANTERIOR
ORIENTATION: ANTERIOR

## 2023-03-26 ASSESSMENT — PAIN DESCRIPTION - FREQUENCY
FREQUENCY: INTERMITTENT
FREQUENCY: INTERMITTENT

## 2023-03-26 ASSESSMENT — PAIN SCALES - GENERAL
PAINLEVEL_OUTOF10: 0
PAINLEVEL_OUTOF10: 0
PAINLEVEL_OUTOF10: 3
PAINLEVEL_OUTOF10: 3
PAINLEVEL_OUTOF10: 0

## 2023-03-26 ASSESSMENT — PAIN DESCRIPTION - LOCATION
LOCATION: HEAD
LOCATION: HEAD

## 2023-03-26 ASSESSMENT — PAIN DESCRIPTION - DESCRIPTORS
DESCRIPTORS: ACHING
DESCRIPTORS: ACHING

## 2023-03-26 ASSESSMENT — PAIN DESCRIPTION - ONSET
ONSET: GRADUAL
ONSET: GRADUAL

## 2023-03-26 ASSESSMENT — PAIN DESCRIPTION - PAIN TYPE
TYPE: ACUTE PAIN
TYPE: ACUTE PAIN

## 2023-03-26 NOTE — PLAN OF CARE
Problem: Safety - Adult  Goal: Free from fall injury  3/26/2023 0922 by Ted Vargas RN  Outcome: Progressing  3/25/2023 2130 by Rachael Licea RN  Outcome: Progressing     Problem: Discharge Planning  Goal: Discharge to home or other facility with appropriate resources  3/26/2023 4230 by Ted Vargas RN  Outcome: Progressing  3/25/2023 2130 by Rachael Licea RN  Outcome: Progressing     Problem: Skin/Tissue Integrity  Goal: Absence of new skin breakdown  Description: 1. Monitor for areas of redness and/or skin breakdown  2. Assess vascular access sites hourly  3. Every 4-6 hours minimum:  Change oxygen saturation probe site  4. Every 4-6 hours:  If on nasal continuous positive airway pressure, respiratory therapy assess nares and determine need for appliance change or resting period.   3/26/2023 6288 by Ted Vargas RN  Outcome: Progressing  3/25/2023 2130 by Rachael Licea RN  Outcome: Progressing     Problem: ABCDS Injury Assessment  Goal: Absence of physical injury  3/26/2023 6704 by Ted Vargas RN  Outcome: Progressing  3/25/2023 2130 by Rachael Licea RN  Outcome: Progressing     Problem: Pain  Goal: Verbalizes/displays adequate comfort level or baseline comfort level  3/26/2023 0922 by Ted Vargas RN  Outcome: Progressing  3/25/2023 2130 by Rachael Licea RN  Outcome: Progressing

## 2023-03-26 NOTE — PROGRESS NOTES
Met with patient today for swing bed activities . Provided patient with spiritual reflection page for today's activities.   Norman Vora

## 2023-03-26 NOTE — PLAN OF CARE
Problem: Safety - Adult  Goal: Free from fall injury  3/25/2023 2130 by Narcisa Tenorio RN  Outcome: Progressing  3/25/2023 0942 by Crispin Barbosa RN  Outcome: Progressing     Problem: Discharge Planning  Goal: Discharge to home or other facility with appropriate resources  3/25/2023 2130 by Narcisa Tenorio RN  Outcome: Progressing  3/25/2023 0942 by Crispin Barbosa RN  Outcome: Progressing     Problem: Skin/Tissue Integrity  Goal: Absence of new skin breakdown  Description: 1. Monitor for areas of redness and/or skin breakdown  2. Assess vascular access sites hourly  3. Every 4-6 hours minimum:  Change oxygen saturation probe site  4. Every 4-6 hours:  If on nasal continuous positive airway pressure, respiratory therapy assess nares and determine need for appliance change or resting period.   3/25/2023 2130 by Narcisa Tenorio RN  Outcome: Progressing  3/25/2023 0942 by Crispin Barbosa RN  Outcome: Progressing     Problem: ABCDS Injury Assessment  Goal: Absence of physical injury  3/25/2023 2130 by Narcisa Tenorio RN  Outcome: Progressing  3/25/2023 0942 by Crispin Barbosa RN  Outcome: Progressing     Problem: Pain  Goal: Verbalizes/displays adequate comfort level or baseline comfort level  3/25/2023 2130 by Narcisa Tenorio RN  Outcome: Progressing  3/25/2023 0942 by Crispin Barbosa RN  Outcome: Progressing

## 2023-03-27 LAB
ANION GAP SERPL CALCULATED.3IONS-SCNC: 7 MMOL/L (ref 4–16)
BUN SERPL-MCNC: 11 MG/DL (ref 6–23)
CALCIUM SERPL-MCNC: 8.5 MG/DL (ref 8.3–10.6)
CHLORIDE BLD-SCNC: 102 MMOL/L (ref 99–110)
CO2: 35 MMOL/L (ref 21–32)
CREAT SERPL-MCNC: 0.9 MG/DL (ref 0.6–1.1)
GFR SERPL CREATININE-BSD FRML MDRD: >60 ML/MIN/1.73M2
GLUCOSE BLD-MCNC: 121 MG/DL (ref 70–99)
GLUCOSE BLD-MCNC: 156 MG/DL (ref 70–99)
GLUCOSE BLD-MCNC: 171 MG/DL (ref 70–99)
GLUCOSE BLD-MCNC: 174 MG/DL (ref 70–99)
GLUCOSE SERPL-MCNC: 121 MG/DL (ref 70–99)
HCT VFR BLD CALC: 27.1 % (ref 37–47)
HEMOGLOBIN: 7.7 GM/DL (ref 12.5–16)
MCH RBC QN AUTO: 26 PG (ref 27–31)
MCHC RBC AUTO-ENTMCNC: 28.4 % (ref 32–36)
MCV RBC AUTO: 91.6 FL (ref 78–100)
PDW BLD-RTO: 14.6 % (ref 11.7–14.9)
PLATELET # BLD: 202 K/CU MM (ref 140–440)
PMV BLD AUTO: 11.3 FL (ref 7.5–11.1)
POTASSIUM SERPL-SCNC: 3.5 MMOL/L (ref 3.5–5.1)
RBC # BLD: 2.96 M/CU MM (ref 4.2–5.4)
SODIUM BLD-SCNC: 144 MMOL/L (ref 135–145)
WBC # BLD: 4.2 K/CU MM (ref 4–10.5)

## 2023-03-27 PROCEDURE — 80048 BASIC METABOLIC PNL TOTAL CA: CPT

## 2023-03-27 PROCEDURE — 2700000000 HC OXYGEN THERAPY PER DAY

## 2023-03-27 PROCEDURE — 6370000000 HC RX 637 (ALT 250 FOR IP): Performed by: STUDENT IN AN ORGANIZED HEALTH CARE EDUCATION/TRAINING PROGRAM

## 2023-03-27 PROCEDURE — 82962 GLUCOSE BLOOD TEST: CPT

## 2023-03-27 PROCEDURE — 83540 ASSAY OF IRON: CPT

## 2023-03-27 PROCEDURE — 6370000000 HC RX 637 (ALT 250 FOR IP): Performed by: NURSE PRACTITIONER

## 2023-03-27 PROCEDURE — 82728 ASSAY OF FERRITIN: CPT

## 2023-03-27 PROCEDURE — 6370000000 HC RX 637 (ALT 250 FOR IP): Performed by: PHYSICIAN ASSISTANT

## 2023-03-27 PROCEDURE — 1200000002 HC SEMI PRIVATE SWING BED

## 2023-03-27 PROCEDURE — 94761 N-INVAS EAR/PLS OXIMETRY MLT: CPT

## 2023-03-27 PROCEDURE — 85027 COMPLETE CBC AUTOMATED: CPT

## 2023-03-27 PROCEDURE — 6360000002 HC RX W HCPCS: Performed by: STUDENT IN AN ORGANIZED HEALTH CARE EDUCATION/TRAINING PROGRAM

## 2023-03-27 PROCEDURE — 97110 THERAPEUTIC EXERCISES: CPT

## 2023-03-27 RX ORDER — POTASSIUM CHLORIDE 20 MEQ/1
40 TABLET, EXTENDED RELEASE ORAL ONCE
Status: COMPLETED | OUTPATIENT
Start: 2023-03-27 | End: 2023-03-27

## 2023-03-27 RX ADMIN — ATORVASTATIN CALCIUM 20 MG: 20 TABLET, FILM COATED ORAL at 17:53

## 2023-03-27 RX ADMIN — ENOXAPARIN SODIUM 40 MG: 100 INJECTION SUBCUTANEOUS at 08:55

## 2023-03-27 RX ADMIN — BUMETANIDE 1 MG: 1 TABLET ORAL at 08:53

## 2023-03-27 RX ADMIN — ENOXAPARIN SODIUM 40 MG: 100 INJECTION SUBCUTANEOUS at 21:17

## 2023-03-27 RX ADMIN — INSULIN LISPRO 10 UNITS: 100 INJECTION, SOLUTION INTRAVENOUS; SUBCUTANEOUS at 17:54

## 2023-03-27 RX ADMIN — INSULIN GLARGINE 30 UNITS: 100 INJECTION, SOLUTION SUBCUTANEOUS at 21:17

## 2023-03-27 RX ADMIN — CYANOCOBALAMIN TAB 1000 MCG 1000 MCG: 1000 TAB at 08:53

## 2023-03-27 RX ADMIN — ACETAMINOPHEN 650 MG: 325 TABLET ORAL at 17:52

## 2023-03-27 RX ADMIN — CARVEDILOL 6.25 MG: 6.25 TABLET, FILM COATED ORAL at 08:53

## 2023-03-27 RX ADMIN — PANTOPRAZOLE SODIUM 40 MG: 40 TABLET, DELAYED RELEASE ORAL at 04:08

## 2023-03-27 RX ADMIN — ACETAMINOPHEN 650 MG: 325 TABLET ORAL at 08:52

## 2023-03-27 RX ADMIN — INSULIN LISPRO 10 UNITS: 100 INJECTION, SOLUTION INTRAVENOUS; SUBCUTANEOUS at 09:11

## 2023-03-27 RX ADMIN — POTASSIUM CHLORIDE 40 MEQ: 1500 TABLET, EXTENDED RELEASE ORAL at 16:10

## 2023-03-27 RX ADMIN — INSULIN LISPRO 10 UNITS: 100 INJECTION, SOLUTION INTRAVENOUS; SUBCUTANEOUS at 12:40

## 2023-03-27 RX ADMIN — CARVEDILOL 6.25 MG: 6.25 TABLET, FILM COATED ORAL at 17:53

## 2023-03-27 ASSESSMENT — PAIN SCALES - GENERAL
PAINLEVEL_OUTOF10: 0
PAINLEVEL_OUTOF10: 2
PAINLEVEL_OUTOF10: 0
PAINLEVEL_OUTOF10: 0
PAINLEVEL_OUTOF10: 2

## 2023-03-27 ASSESSMENT — PAIN DESCRIPTION - ORIENTATION
ORIENTATION: RIGHT;LEFT
ORIENTATION: UPPER;LOWER

## 2023-03-27 ASSESSMENT — PAIN DESCRIPTION - DESCRIPTORS
DESCRIPTORS: ACHING
DESCRIPTORS: ACHING

## 2023-03-27 ASSESSMENT — PAIN SCALES - WONG BAKER
WONGBAKER_NUMERICALRESPONSE: 0
WONGBAKER_NUMERICALRESPONSE: 0

## 2023-03-27 ASSESSMENT — PAIN DESCRIPTION - LOCATION
LOCATION: GENERALIZED
LOCATION: GENERALIZED

## 2023-03-27 NOTE — PROGRESS NOTES
This RN has reviewed and agrees with Yandy Benitez LPN's data collection and has collaborated with this LPN regarding the patient's care plan.

## 2023-03-27 NOTE — PROGRESS NOTES
Occupational Therapy    Occupational Therapy Treatment Note  Name: Angeles Gutiérrez MRN: [id number] :   1965   Date:  3/27/2023   Admission Date: 3/16/2023 Room:  [address] [place], [place] [id number]   Restrictions/Precautions:  Restrictions/Precautions  Restrictions/Precautions: Fall Risk, General Precautions     Communication with other providers:   Cleared for treatment by RN. Subjective:  Patient states:  \"I feel very weak and achy today\"  Pain:   Location, Type, Intensity (0/10 to 10/10):  5/10 B knees. Objective:    Observation:  pt alert and oriented      Treatment, including education:  Transfers    Sit to stand :SBA  Stand to sit :SBA      Therapeutic Exercise:  Pt completed UE exercises to increase strength and ROM to facilitate increase for ADLs and functional mobility. Pt completed B UEs with 3# dumbbell exercises with curls, shoulder presses, punch, stirring and wrist curls x 20 reps with mod rest breaks due to fatigue. Pt completed red theraband pulls horizontally, punches and upwards x 20 reps with mod rest breaks. Safety Measures: Gait belt used, up in recliner Pull/Bed Alarm activated and call light left in reach        Assessment / Impression:        Patient's tolerance of treatment: Good   Adverse Reaction: None  Significant change in status and impact:  None  Barriers to improvement:  Dec strength and endurance    Plan for Next Session:    Continue with POC. Time in:  1400  Time out:  1442  Total treatment time:  42  Billed Units: 3TE,   Electronically signed by:    Angela Hays, [address]    3/27/2023, 2:26 PM    Previously filed values:  Patient Goals   Patient goals :  To go home  Short Term Goals  Time Frame for Short Term Goals: Until DC or goals met  Short Term Goal 1: Pt will complete functional trfs mod I  Short Term Goal 2: Pt will complete UE ADLs mod I  Short Term Goal 3: Pt will complete LE ADLs mod I  Short Term Goal 4: Pt will tolerate >8 min of standing for theract/ADLs w/o increased SOB  Short Term Goal 5: Pt will complete BUE therex to increase strength/endurance for functional mobility/ADLs

## 2023-03-27 NOTE — PROGRESS NOTES
Met with patient today for swing bed activities . Patient was not feeling up to daily visit today but was provided with an independent craft to complete at her leisure.   Marychuy Galan

## 2023-03-27 NOTE — PROGRESS NOTES
Occupational Therapy  Pt attempted in the AM and declined to \"not feeling well right now\". Pt asked therapist to check with her later in day if possible.   Annie PAL/FRANCISCO [id number]

## 2023-03-27 NOTE — PROGRESS NOTES
ml        Vitals:   Vitals:    03/27/23 0839   BP:    Pulse:    Resp:    Temp:    SpO2: 98%     Physical Exam:   GEN Awake female, sitting upright in bed in no apparent distress. Appears given age. EYES Pupils are equally round. No scleral erythema, discharge, or conjunctivitis. NECK Supple, no apparent thyromegaly or masses. RESP Symmetric chest movement while on 2L NC  HEME/LYMPH No petechiae or ecchymoses. MSK No gross joint deformities. SKIN Normal coloration, warm, dry. NEURO Cranial nerves appear grossly intact, normal speech  PSYCH Awake, alert, oriented x 4. Affect appropriate.     Medications:   Medications:    insulin lispro  10 Units SubCUTAneous TID WC    insulin glargine  30 Units SubCUTAneous Nightly    insulin lispro  0-4 Units SubCUTAneous TID WC    insulin lispro  0-4 Units SubCUTAneous Nightly    carvedilol  6.25 mg Oral BID WC    atorvastatin  20 mg Oral QPM    cyanocobalamin  1,000 mcg Oral Daily    bumetanide  1 mg Oral Daily    enoxaparin  40 mg SubCUTAneous BID    pantoprazole  40 mg Oral QAM AC      Infusions:   PRN Meds: albuterol sulfate HFA, 2 puff, Q6H PRN  glucose, 4 tablet, PRN  glucagon (rDNA), 1 mg, PRN  ondansetron, 4 mg, Q8H PRN   Or  ondansetron, 4 mg, Q6H PRN  polyethylene glycol, 17 g, Daily PRN  acetaminophen, 650 mg, Q6H PRN   Or  acetaminophen, 650 mg, Q6H PRN        Electronically signed by MASON Velasquez CNP on 3/27/2023 at 1:20 PM

## 2023-03-28 LAB
ANION GAP SERPL CALCULATED.3IONS-SCNC: 7 MMOL/L (ref 4–16)
BASOPHILS ABSOLUTE: 0 K/CU MM
BASOPHILS RELATIVE PERCENT: 0.4 % (ref 0–1)
BUN SERPL-MCNC: 10 MG/DL (ref 6–23)
CALCIUM SERPL-MCNC: 8.5 MG/DL (ref 8.3–10.6)
CHLORIDE BLD-SCNC: 102 MMOL/L (ref 99–110)
CO2: 35 MMOL/L (ref 21–32)
CREAT SERPL-MCNC: 0.9 MG/DL (ref 0.6–1.1)
DIFFERENTIAL TYPE: ABNORMAL
EOSINOPHILS ABSOLUTE: 0.2 K/CU MM
EOSINOPHILS RELATIVE PERCENT: 4 % (ref 0–3)
GFR SERPL CREATININE-BSD FRML MDRD: >60 ML/MIN/1.73M2
GLUCOSE BLD-MCNC: 134 MG/DL (ref 70–99)
GLUCOSE BLD-MCNC: 166 MG/DL (ref 70–99)
GLUCOSE BLD-MCNC: 192 MG/DL (ref 70–99)
GLUCOSE BLD-MCNC: 98 MG/DL (ref 70–99)
GLUCOSE SERPL-MCNC: 100 MG/DL (ref 70–99)
HCT VFR BLD CALC: 26.9 % (ref 37–47)
HEMOGLOBIN: 7.4 GM/DL (ref 12.5–16)
IMMATURE NEUTROPHIL %: 0.4 % (ref 0–0.43)
LYMPHOCYTES ABSOLUTE: 1 K/CU MM
LYMPHOCYTES RELATIVE PERCENT: 22 % (ref 24–44)
MAGNESIUM: 1.6 MG/DL (ref 1.8–2.4)
MCH RBC QN AUTO: 25.9 PG (ref 27–31)
MCHC RBC AUTO-ENTMCNC: 27.5 % (ref 32–36)
MCV RBC AUTO: 94.1 FL (ref 78–100)
MONOCYTES ABSOLUTE: 0.4 K/CU MM
MONOCYTES RELATIVE PERCENT: 9.1 % (ref 0–4)
PDW BLD-RTO: 14.5 % (ref 11.7–14.9)
PHOSPHORUS: 4 MG/DL (ref 2.5–4.9)
PLATELET # BLD: 192 K/CU MM (ref 140–440)
PMV BLD AUTO: 10.4 FL (ref 7.5–11.1)
POTASSIUM SERPL-SCNC: 3.8 MMOL/L (ref 3.5–5.1)
RBC # BLD: 2.86 M/CU MM (ref 4.2–5.4)
SEGMENTED NEUTROPHILS ABSOLUTE COUNT: 2.9 K/CU MM
SEGMENTED NEUTROPHILS RELATIVE PERCENT: 64.1 % (ref 36–66)
SODIUM BLD-SCNC: 144 MMOL/L (ref 135–145)
TOTAL IMMATURE NEUTOROPHIL: 0.02 K/CU MM
WBC # BLD: 4.5 K/CU MM (ref 4–10.5)

## 2023-03-28 PROCEDURE — 6360000002 HC RX W HCPCS: Performed by: STUDENT IN AN ORGANIZED HEALTH CARE EDUCATION/TRAINING PROGRAM

## 2023-03-28 PROCEDURE — 97530 THERAPEUTIC ACTIVITIES: CPT

## 2023-03-28 PROCEDURE — 2700000000 HC OXYGEN THERAPY PER DAY

## 2023-03-28 PROCEDURE — 85025 COMPLETE CBC W/AUTO DIFF WBC: CPT

## 2023-03-28 PROCEDURE — 97535 SELF CARE MNGMENT TRAINING: CPT

## 2023-03-28 PROCEDURE — 1200000002 HC SEMI PRIVATE SWING BED

## 2023-03-28 PROCEDURE — 6370000000 HC RX 637 (ALT 250 FOR IP): Performed by: PHYSICIAN ASSISTANT

## 2023-03-28 PROCEDURE — 80048 BASIC METABOLIC PNL TOTAL CA: CPT

## 2023-03-28 PROCEDURE — G0283 ELEC STIM OTHER THAN WOUND: HCPCS

## 2023-03-28 PROCEDURE — 6370000000 HC RX 637 (ALT 250 FOR IP): Performed by: STUDENT IN AN ORGANIZED HEALTH CARE EDUCATION/TRAINING PROGRAM

## 2023-03-28 PROCEDURE — 83735 ASSAY OF MAGNESIUM: CPT

## 2023-03-28 PROCEDURE — 94761 N-INVAS EAR/PLS OXIMETRY MLT: CPT

## 2023-03-28 PROCEDURE — 82962 GLUCOSE BLOOD TEST: CPT

## 2023-03-28 PROCEDURE — 84100 ASSAY OF PHOSPHORUS: CPT

## 2023-03-28 RX ADMIN — CARVEDILOL 6.25 MG: 6.25 TABLET, FILM COATED ORAL at 17:36

## 2023-03-28 RX ADMIN — INSULIN LISPRO 10 UNITS: 100 INJECTION, SOLUTION INTRAVENOUS; SUBCUTANEOUS at 13:15

## 2023-03-28 RX ADMIN — BUMETANIDE 1 MG: 1 TABLET ORAL at 10:42

## 2023-03-28 RX ADMIN — ENOXAPARIN SODIUM 40 MG: 100 INJECTION SUBCUTANEOUS at 20:51

## 2023-03-28 RX ADMIN — ACETAMINOPHEN 650 MG: 325 TABLET ORAL at 01:56

## 2023-03-28 RX ADMIN — ENOXAPARIN SODIUM 40 MG: 100 INJECTION SUBCUTANEOUS at 10:42

## 2023-03-28 RX ADMIN — ACETAMINOPHEN 650 MG: 325 TABLET ORAL at 18:26

## 2023-03-28 RX ADMIN — ACETAMINOPHEN 650 MG: 325 TABLET ORAL at 10:41

## 2023-03-28 RX ADMIN — PANTOPRAZOLE SODIUM 40 MG: 40 TABLET, DELAYED RELEASE ORAL at 05:14

## 2023-03-28 RX ADMIN — CARVEDILOL 6.25 MG: 6.25 TABLET, FILM COATED ORAL at 10:41

## 2023-03-28 RX ADMIN — INSULIN GLARGINE 30 UNITS: 100 INJECTION, SOLUTION SUBCUTANEOUS at 20:52

## 2023-03-28 RX ADMIN — CYANOCOBALAMIN TAB 1000 MCG 1000 MCG: 1000 TAB at 10:42

## 2023-03-28 RX ADMIN — ONDANSETRON 4 MG: 4 TABLET, ORALLY DISINTEGRATING ORAL at 17:34

## 2023-03-28 RX ADMIN — INSULIN LISPRO 10 UNITS: 100 INJECTION, SOLUTION INTRAVENOUS; SUBCUTANEOUS at 18:47

## 2023-03-28 RX ADMIN — ATORVASTATIN CALCIUM 20 MG: 20 TABLET, FILM COATED ORAL at 17:36

## 2023-03-28 ASSESSMENT — PAIN - FUNCTIONAL ASSESSMENT
PAIN_FUNCTIONAL_ASSESSMENT: ACTIVITIES ARE NOT PREVENTED

## 2023-03-28 ASSESSMENT — PAIN DESCRIPTION - ORIENTATION
ORIENTATION: RIGHT
ORIENTATION: RIGHT;LEFT
ORIENTATION: MID

## 2023-03-28 ASSESSMENT — PAIN SCALES - GENERAL
PAINLEVEL_OUTOF10: 3
PAINLEVEL_OUTOF10: 3
PAINLEVEL_OUTOF10: 0
PAINLEVEL_OUTOF10: 1
PAINLEVEL_OUTOF10: 3

## 2023-03-28 ASSESSMENT — PAIN DESCRIPTION - PAIN TYPE: TYPE: CHRONIC PAIN

## 2023-03-28 ASSESSMENT — PAIN DESCRIPTION - LOCATION
LOCATION: BACK
LOCATION: LEG
LOCATION: HEAD

## 2023-03-28 ASSESSMENT — PAIN DESCRIPTION - ONSET: ONSET: GRADUAL

## 2023-03-28 ASSESSMENT — PAIN DESCRIPTION - FREQUENCY: FREQUENCY: CONTINUOUS

## 2023-03-28 ASSESSMENT — PAIN DESCRIPTION - DESCRIPTORS
DESCRIPTORS: ACHING

## 2023-03-28 NOTE — FLOWSHEET NOTE
strength and endurance to increase independence with bed mobility, transfers, and ambulation. Patient would benefit from continued skilled physical therapy services to address decreased strength, ROM, and endurance, impaired balance, and decline in functional mobility.     Discharge Recommendations:  Continue to assess pending progress, Home with assist PRN, Home with Home health PT        Social/Functional History:  Lives With: Alone  Home Layout: One level  Home Access: Level entry     PT Equipment:  Home Equipment: Oxygen (Pt wears 2L of O2 24/7 and has portable and concentrator tanks)    Goals:         Short Term Goals  Time Frame for Short Term Goals: 1 week  Short Term Goal 1: Pt will perform sit><supine Mod I  Short Term Goal 2: Pt will transfer to all surfaces SBA  Short Term Goal 3: Pt will ambulate 150ft with LRAD Mod I  Short Term Goal 4: Pt will perform standing light dynamic activity x 3 minutes, single UE support if needed SBA         Time In 1600   Time Out 1655   Total Minutes 55   Billed Units 3TA, 1Mod       Evgeny Ulloa PT,DPT [id number] 3/28/2023,  5:09 PM

## 2023-03-28 NOTE — CARE COORDINATION
CM submitted updated clinical documentation to [address] [place] [address] to request an extension to the patient's stay in the swing bed program.  CM will follow.

## 2023-03-28 NOTE — PROGRESS NOTES
Occupational Therapy    Occupational Therapy Treatment Note  Name: Tristan Kasper MRN: [id number] :   1965   Date:  3/28/2023   Admission Date: 3/16/2023 Room:  [address] [place] [address]   Restrictions/Precautions:  Restrictions/Precautions  Restrictions/Precautions: Fall Risk, General Precautions     Communication with other providers:   Cleared for treatment by RN. Subjective:  Patient states:  \"I need to get a shower\"  Pain:   Location, Type, Intensity (0/10 to 10/10):  5/10 sciatic pain    Objective:    Observation:  pt alert and oriented up in recliner. Treatment, including education:  Transfers    Sit to stand :CGA  Stand to sit :CGA  SPT:CGA  Shower CGA    Self Care Training:   Activities performed today included the following:      Grooming  Mod I to wash face and comb hair    Bathing   Sup on shower bench stood using hand rails to stand to wash buttocks. UB Dress  pt donned gown    LB Dress  Dep to don socks. Safety Measures: Gait belt used, Left in chair, Pull/Bed Alarm activated and call light left in reach        Assessment / Impression:        Patient's tolerance of treatment: Good   Adverse Reaction: None  Significant change in status and impact:  None  Barriers to improvement: Dec strength and endurance    Plan for Next Session:    Continue with POC. Time in:  910  Time out:  1005  Total treatment time:  55  Billed Units:  4 ADL  Electronically signed by:    Rajni Warren, [address]    3/28/2023, 4:06 PM    Previously filed values:  Patient Goals   Patient goals :  To go home  Short Term Goals  Time Frame for Short Term Goals: Until DC or goals met  Short Term Goal 1: Pt will complete functional trfs mod I  Short Term Goal 2: Pt will complete UE ADLs mod I  Short Term Goal 3: Pt will complete LE ADLs mod I  Short Term Goal 4: Pt will tolerate >8 min of standing for theract/ADLs w/o increased SOB  Short Term Goal 5: Pt will complete BUE therex to increase strength/endurance for functional mobility/ADLs

## 2023-03-29 LAB
ANION GAP SERPL CALCULATED.3IONS-SCNC: 8 MMOL/L (ref 4–16)
BASOPHILS ABSOLUTE: 0 K/CU MM
BASOPHILS RELATIVE PERCENT: 0.2 % (ref 0–1)
BUN SERPL-MCNC: 9 MG/DL (ref 6–23)
CALCIUM SERPL-MCNC: 8.2 MG/DL (ref 8.3–10.6)
CHLORIDE BLD-SCNC: 99 MMOL/L (ref 99–110)
CO2: 33 MMOL/L (ref 21–32)
CREAT SERPL-MCNC: 0.9 MG/DL (ref 0.6–1.1)
DIFFERENTIAL TYPE: ABNORMAL
EOSINOPHILS ABSOLUTE: 0.1 K/CU MM
EOSINOPHILS RELATIVE PERCENT: 2 % (ref 0–3)
GFR SERPL CREATININE-BSD FRML MDRD: >60 ML/MIN/1.73M2
GLUCOSE BLD-MCNC: 185 MG/DL (ref 70–99)
GLUCOSE BLD-MCNC: 186 MG/DL (ref 70–99)
GLUCOSE BLD-MCNC: 229 MG/DL (ref 70–99)
GLUCOSE BLD-MCNC: 95 MG/DL (ref 70–99)
GLUCOSE SERPL-MCNC: 97 MG/DL (ref 70–99)
HCT VFR BLD CALC: 25.9 % (ref 37–47)
HEMOGLOBIN: 7.4 GM/DL (ref 12.5–16)
IMMATURE NEUTROPHIL %: 0.4 % (ref 0–0.43)
LYMPHOCYTES ABSOLUTE: 1.1 K/CU MM
LYMPHOCYTES RELATIVE PERCENT: 20.6 % (ref 24–44)
MAGNESIUM: 1.6 MG/DL (ref 1.8–2.4)
MCH RBC QN AUTO: 26 PG (ref 27–31)
MCHC RBC AUTO-ENTMCNC: 28.6 % (ref 32–36)
MCV RBC AUTO: 90.9 FL (ref 78–100)
MONOCYTES ABSOLUTE: 0.5 K/CU MM
MONOCYTES RELATIVE PERCENT: 9.9 % (ref 0–4)
PDW BLD-RTO: 14.4 % (ref 11.7–14.9)
PHOSPHORUS: 3.8 MG/DL (ref 2.5–4.9)
PLATELET # BLD: 180 K/CU MM (ref 140–440)
PMV BLD AUTO: 10 FL (ref 7.5–11.1)
POTASSIUM SERPL-SCNC: 3.8 MMOL/L (ref 3.5–5.1)
RBC # BLD: 2.85 M/CU MM (ref 4.2–5.4)
SEGMENTED NEUTROPHILS ABSOLUTE COUNT: 3.6 K/CU MM
SEGMENTED NEUTROPHILS RELATIVE PERCENT: 66.9 % (ref 36–66)
SODIUM BLD-SCNC: 140 MMOL/L (ref 135–145)
TOTAL IMMATURE NEUTOROPHIL: 0.02 K/CU MM
WBC # BLD: 5.4 K/CU MM (ref 4–10.5)

## 2023-03-29 PROCEDURE — 82962 GLUCOSE BLOOD TEST: CPT

## 2023-03-29 PROCEDURE — 84100 ASSAY OF PHOSPHORUS: CPT

## 2023-03-29 PROCEDURE — 83735 ASSAY OF MAGNESIUM: CPT

## 2023-03-29 PROCEDURE — 94761 N-INVAS EAR/PLS OXIMETRY MLT: CPT

## 2023-03-29 PROCEDURE — 2700000000 HC OXYGEN THERAPY PER DAY

## 2023-03-29 PROCEDURE — 1200000002 HC SEMI PRIVATE SWING BED

## 2023-03-29 PROCEDURE — 6370000000 HC RX 637 (ALT 250 FOR IP): Performed by: PHYSICIAN ASSISTANT

## 2023-03-29 PROCEDURE — 6370000000 HC RX 637 (ALT 250 FOR IP): Performed by: NURSE PRACTITIONER

## 2023-03-29 PROCEDURE — 97530 THERAPEUTIC ACTIVITIES: CPT

## 2023-03-29 PROCEDURE — 97535 SELF CARE MNGMENT TRAINING: CPT

## 2023-03-29 PROCEDURE — 6360000002 HC RX W HCPCS: Performed by: STUDENT IN AN ORGANIZED HEALTH CARE EDUCATION/TRAINING PROGRAM

## 2023-03-29 PROCEDURE — 6370000000 HC RX 637 (ALT 250 FOR IP): Performed by: STUDENT IN AN ORGANIZED HEALTH CARE EDUCATION/TRAINING PROGRAM

## 2023-03-29 PROCEDURE — 97110 THERAPEUTIC EXERCISES: CPT

## 2023-03-29 PROCEDURE — 97116 GAIT TRAINING THERAPY: CPT

## 2023-03-29 PROCEDURE — 80048 BASIC METABOLIC PNL TOTAL CA: CPT

## 2023-03-29 PROCEDURE — 85025 COMPLETE CBC W/AUTO DIFF WBC: CPT

## 2023-03-29 RX ORDER — LANOLIN ALCOHOL/MO/W.PET/CERES
400 CREAM (GRAM) TOPICAL 2 TIMES DAILY
Status: DISCONTINUED | OUTPATIENT
Start: 2023-03-29 | End: 2023-04-01 | Stop reason: HOSPADM

## 2023-03-29 RX ADMIN — ACETAMINOPHEN 650 MG: 325 TABLET ORAL at 22:28

## 2023-03-29 RX ADMIN — CYANOCOBALAMIN TAB 1000 MCG 1000 MCG: 1000 TAB at 08:19

## 2023-03-29 RX ADMIN — Medication 400 MG: at 20:55

## 2023-03-29 RX ADMIN — CARVEDILOL 6.25 MG: 6.25 TABLET, FILM COATED ORAL at 17:05

## 2023-03-29 RX ADMIN — INSULIN LISPRO 10 UNITS: 100 INJECTION, SOLUTION INTRAVENOUS; SUBCUTANEOUS at 17:36

## 2023-03-29 RX ADMIN — PANTOPRAZOLE SODIUM 40 MG: 40 TABLET, DELAYED RELEASE ORAL at 06:15

## 2023-03-29 RX ADMIN — ENOXAPARIN SODIUM 40 MG: 100 INJECTION SUBCUTANEOUS at 20:58

## 2023-03-29 RX ADMIN — BUMETANIDE 1 MG: 1 TABLET ORAL at 08:16

## 2023-03-29 RX ADMIN — ACETAMINOPHEN 650 MG: 325 TABLET ORAL at 10:33

## 2023-03-29 RX ADMIN — INSULIN LISPRO 1 UNITS: 100 INJECTION, SOLUTION INTRAVENOUS; SUBCUTANEOUS at 12:10

## 2023-03-29 RX ADMIN — ATORVASTATIN CALCIUM 20 MG: 20 TABLET, FILM COATED ORAL at 17:05

## 2023-03-29 RX ADMIN — INSULIN LISPRO 10 UNITS: 100 INJECTION, SOLUTION INTRAVENOUS; SUBCUTANEOUS at 12:10

## 2023-03-29 RX ADMIN — ACETAMINOPHEN 650 MG: 325 TABLET ORAL at 16:04

## 2023-03-29 RX ADMIN — CARVEDILOL 6.25 MG: 6.25 TABLET, FILM COATED ORAL at 08:17

## 2023-03-29 RX ADMIN — ENOXAPARIN SODIUM 40 MG: 100 INJECTION SUBCUTANEOUS at 08:17

## 2023-03-29 RX ADMIN — Medication 400 MG: at 10:41

## 2023-03-29 RX ADMIN — INSULIN GLARGINE 30 UNITS: 100 INJECTION, SOLUTION SUBCUTANEOUS at 20:58

## 2023-03-29 ASSESSMENT — PAIN DESCRIPTION - ORIENTATION
ORIENTATION: ANTERIOR
ORIENTATION: ANTERIOR

## 2023-03-29 ASSESSMENT — PAIN SCALES - GENERAL
PAINLEVEL_OUTOF10: 3
PAINLEVEL_OUTOF10: 0
PAINLEVEL_OUTOF10: 3

## 2023-03-29 ASSESSMENT — PAIN DESCRIPTION - DESCRIPTORS
DESCRIPTORS: ACHING
DESCRIPTORS: ACHING

## 2023-03-29 ASSESSMENT — PAIN DESCRIPTION - ONSET
ONSET: ON-GOING
ONSET: GRADUAL

## 2023-03-29 ASSESSMENT — PAIN DESCRIPTION - PAIN TYPE
TYPE: ACUTE PAIN
TYPE: ACUTE PAIN

## 2023-03-29 ASSESSMENT — PAIN DESCRIPTION - LOCATION
LOCATION: HEAD
LOCATION: HEAD

## 2023-03-29 ASSESSMENT — PAIN DESCRIPTION - FREQUENCY
FREQUENCY: CONTINUOUS
FREQUENCY: CONTINUOUS

## 2023-03-29 NOTE — CARE COORDINATION
CM received call from [facility] stating that a [employer] [place] will be issued today with last covered day being Friday 3/31/23. Awaiting the [facility]ART letter at this time, CM will follow. 12:12 PM  CM met with the patient to notify her that her insurance provider has issued a [facility] HEART letter terminating insurance coverage of skilled care in the swing bed program as of Friday 3/31/23. Patient voiced understanding and signed the [facility] HEART letter. Patient stated that she would like to discharge Saturday 4/1/23, would like a referral for [address] services (nursing & PT-declined OT), would like a two-wheeled walker prior to discharge, and stated that family can provide transportation. Patient stated that she did not have a preference regarding the [address] agency selected as long as it was in-network with her insurance. CM will follow. 12:25 PM  CM faxed the patient's signed [facility] [place] to [facility] as requested. CM will follow. 12:34 PM  CM faxed the DME order for a heavy duty two-wheeled walker to [address] to initiate the referral.  CM will follow. 1:25 PM  CM called the office of Dr. Wilder Rincon in [address] regarding patient's status. The office representative informed this CM that the patient just called earlier today and scheduled her first appointment with Dr. Wilder Rincon (4/5/23). The physician will not agree to a [address] referral until the patient is seen in the office and care has been established. The physician's office can arrange [address] services for the patient if they feel it is indicated. CM received a call from the patient's nurse Zahira Mendoza RN stating that the patient is now requesting a wheelchair in addition to a walker prior to discharge. CM will follow. 1:45 PM  CM spoke with [employer] at [address] [facility] who advised that the patient's insurance will require prior authorization on the wheelchair which could take up to two weeks to receive.       2:10 PM  CM notified the patient

## 2023-03-29 NOTE — CARE COORDINATION
Sha aCi was evaluated today and a DME order was entered for a heavy duty wheeled walker because she requires this to successfully complete daily living tasks of eating, bathing, toileting, personal cares, ambulating, grooming, hygiene, dressing upper body, dressing lower body, meal preparation, and taking own medications. A heavy duty wheeled walker is necessary due to the patient's unsteady gait, upper body weakness, and inability to  an ambulation device; and she can ambulate only by pushing a heavy duty walker instead of a lesser assistive device such as a cane, crutch, or standard walker. The need for this equipment was discussed with the patient and she understands and is in agreement.

## 2023-03-29 NOTE — PROGRESS NOTES
Occupational Therapy  SWING BED WEEKLY TEAM SHEET     Norma Velásquez   3/29/2023   WEEK # 2    Occupational Therapy    Feeding  [x] Independ [] SBA [] MIN assist  [] mod assist  [] max assist [] tot assist  Grooming [x] Independ [] SBA [] MIN assist  [] mod assist  [] max assist [] tot assist   Bathing upper body [x] Independ [] SBA [] MIN assist  [] mod assist  [] max assist              [] tot assist    Dressing upper body [x] Independ [] SBA [] MIN assist  [] mod assist  [] max assist              [] tot assist    Dressing lower body [] Independ [] SBA [] MIN assist  [] mod assist  [x] max assist              [] tot assist   Toileting [x] Independ [] SBA [] MIN assist  [] mod assist  [] max assist [] tot assist    Home Management: Has help from family    Cognitive/Perception: WFL    Upper extremity motor control: WFL      Goals of previous week:   [] 1st team   [] met   [] partially met    [x] not met             Why goals were not met Dec strength and endurance.      Issues to be resolved before discharge:Inc strength and endurance      Occupational Therapy Signature: Lamonte PAL/FRANCISCO FANNY [id number]

## 2023-03-29 NOTE — FLOWSHEET NOTE
Physical Therapy Daily Treatment Note   Date: 3/29/2023 Room: [address] [place] [address]    Name: Mayra Garcia : 1965   MRN: [id number] Admission Date:3/16/2023      Restrictions/Precautions: fall risk, O2    Initial Pain level: Not rated    Subjective/Observation: Patient lying in bed upon therapist arrival.  States that her sciatic pain is better today, but still present. Agreeable to therapy. Communication with other providers: n/a      Therapeutic Activities/Neuro Re-Education/Gait Training   3/25/23 Date: 3/28/23 3/29/2023   STS   Transfer  BA from recliner, nustep, and w/c, all to RW Mod I from EOB to RW Mod I from EOB to RW   Standing Balance    For amb with SBA For ambulation with SBA     Ambulation 110 ft x2 withRW SBA 10ft x2 to/from restroom with RW and SBA  64ft x2 with RW and SBA.   W/C and O2 management    To and from restroom      Stairs        Therapeutic Exercises   Date: 3/20/23   Date: 3/22/23 Date: 3/23/23 Date:3/2523   Nustep 7:30 Lvl 3  Time limited d/t fatigue   x 12:00 Lvl 2  586 steps  Pt able to hold conversation during activity this date, demonstrating improved endurance 10' L2   Sciatic nerve glides  1x10 B     LAQ  1x10 B     Seated marches  1x10 B     Standing hip ext  1x10 B     Standing hip abd  1x10 B     Heel raises  1x15       Modalities   Date: 3/28/23   Date:  Date:   E-stim IFC R sacral/buttock  20' for pain relief              Education provided: continue HEP exercises in recliner and while in bed when able        Treatment/Activity Tolerance:   [] Patient limited by fatigue   [x] Patient limited by pain   [] Patient limited by other medical complications   [] Patient tolerated therapy well  [] Other:     Safety Precautions:     [x] Left in bed    [] Left in chair   [x] Call light within reach    [] Bed alarm on    [] Personal alarm on    [x] Other staff present: Nursing  [] Family/Caregiver present:      Post Tx Pain Rating:  Not rated       Evaluation Assessment

## 2023-03-29 NOTE — PROGRESS NOTES
V2.0  [facility] [place] Hospitalist Progress Note      Name:  Inez Bartlett /Age/Sex: 1965  (62 y.o. female)   MRN & CSN:  [id number] & [id number] Encounter Date/Time: 3/29/2023 9:59 AM EDT    Location:  [facility]/1126-01 PCP: No primary care provider on file. Hospital Day: 14    Assessment and Plan:   Inez Bartlett is a 62 y.o. female who presents with Physical debility      Plan:       Physical Debility: Admitted to swing bed program for rehab  Continue PT/OT daily   AS PER CM  \"Carol Zamorano was evaluated today and a DME order was entered for a extra heavy duty manual wheelchair with leg rests because she requires this to successfully complete daily living tasks of eating, bathing, toileting, personal cares, ambulating, grooming, hygiene, dressing upper body, dressing lower body, meal preparation and taking own medications. A extra heavy duty manual wheelchair with leg rests is necessary due to patient's impaired ambulation and mobility restrictions and would be unable to resolve these daily living tasks using a cane or walker. The patient is capable of using a extra heavy duty wheelchair with leg rests safely in their home and can maneuver within their home with adequate access. There is a caregiver available to provide necessary assistance. The need for this equipment was discussed with the patient and she understands, is in agreement, and has not expressed an unwillingness to use the extra heavy duty manual wheelchair with leg rests. \"      Hypokalemia: RESOLVED recheck lab Friday     Chronic Hypercapnic/Hypoxemic Respiratory Failure: 2/2 COPD  Continue regular/pulmonary hygiene  2 L/min nasal cannula at baseline,      Recent MSSA Bacteremia and Endophthalmitis: Completed dicloxacillin 3/18/2023     HFpEF:   VHD-Mod/Severe TR, Likely Pulmonary HTN:   Does not appear acutely decompensated  Continue Bumex              Continue to monitor volume status     HTN: Continue Coreg              Trends appear stable insulin lispro  10 Units SubCUTAneous TID     insulin glargine  30 Units SubCUTAneous Nightly    insulin lispro  0-4 Units SubCUTAneous TID     insulin lispro  0-4 Units SubCUTAneous Nightly    carvedilol  6.25 mg Oral BID WC    atorvastatin  20 mg Oral QPM    cyanocobalamin  1,000 mcg Oral Daily    bumetanide  1 mg Oral Daily    enoxaparin  40 mg SubCUTAneous BID    pantoprazole  40 mg Oral QAM AC      Infusions:   PRN Meds: albuterol sulfate HFA, 2 puff, Q6H PRN  glucose, 4 tablet, PRN  glucagon (rDNA), 1 mg, PRN  ondansetron, 4 mg, Q8H PRN   Or  ondansetron, 4 mg, Q6H PRN  polyethylene glycol, 17 g, Daily PRN  acetaminophen, 650 mg, Q6H PRN   Or  acetaminophen, 650 mg, Q6H PRN        Labs      Recent Results (from the past 24 hour(s))   POCT Glucose    Collection Time: 03/28/23 11:54 AM   Result Value Ref Range    POC Glucose 192 (H) 70 - 99 MG/DL   POCT Glucose    Collection Time: 03/28/23  5:15 PM   Result Value Ref Range    POC Glucose 134 (H) 70 - 99 MG/DL   POCT Glucose    Collection Time: 03/28/23  8:50 PM   Result Value Ref Range    POC Glucose 166 (H) 70 - 99 MG/DL   CBC with Auto Differential    Collection Time: 03/29/23  6:15 AM   Result Value Ref Range    WBC 5.4 4.0 - 10.5 K/CU MM    RBC 2.85 (L) 4.2 - 5.4 M/CU MM    Hemoglobin 7.4 (L) 12.5 - 16.0 GM/DL    Hematocrit 25.9 (L) 37 - 47 %    MCV 90.9 78 - 100 FL    MCH 26.0 (L) 27 - 31 PG    MCHC 28.6 (L) 32.0 - 36.0 %    RDW 14.4 11.7 - 14.9 %    Platelets 094 042 - 530 K/CU MM    MPV 10.0 7.5 - 11.1 FL    Differential Type AUTOMATED DIFFERENTIAL     Segs Relative 66.9 (H) 36 - 66 %    Lymphocytes % 20.6 (L) 24 - 44 %    Monocytes % 9.9 (H) 0 - 4 %    Eosinophils % 2.0 0 - 3 %    Basophils % 0.2 0 - 1 %    Segs Absolute 3.6 K/CU MM    Lymphocytes Absolute 1.1 K/CU MM    Monocytes Absolute 0.5 K/CU MM    Eosinophils Absolute 0.1 K/CU MM    Basophils Absolute 0.0 K/CU MM    Immature Neutrophil % 0.4 0 - 0.43 %    Total Immature Neutrophil 0.02 K/CU MM

## 2023-03-29 NOTE — PLAN OF CARE
Problem: Safety - Adult  Goal: Free from fall injury  3/29/2023 1117 by Neeta Hutchinson RN  Outcome: Progressing  3/28/2023 2316 by Fredy Buitrago LPN  Outcome: Progressing     Problem: Discharge Planning  Goal: Discharge to home or other facility with appropriate resources  3/29/2023 1117 by Neeta Hutchinson RN  Outcome: Progressing  3/28/2023 2316 by Fredy Buitrago LPN  Outcome: Progressing     Problem: Skin/Tissue Integrity  Goal: Absence of new skin breakdown  Description: 1. Monitor for areas of redness and/or skin breakdown  2. Assess vascular access sites hourly  3. Every 4-6 hours minimum:  Change oxygen saturation probe site  4. Every 4-6 hours:  If on nasal continuous positive airway pressure, respiratory therapy assess nares and determine need for appliance change or resting period.   3/29/2023 1117 by Neeta Hutchinson RN  Outcome: Progressing  3/28/2023 2316 by Fredy Buitrago LPN  Outcome: Progressing     Problem: ABCDS Injury Assessment  Goal: Absence of physical injury  3/29/2023 1117 by Neeta Hutchinson RN  Outcome: Progressing  3/28/2023 2316 by Fredy Buitrago LPN  Outcome: Progressing     Problem: Pain  Goal: Verbalizes/displays adequate comfort level or baseline comfort level  3/29/2023 1117 by Neeta Hutchinson RN  Outcome: Progressing  3/28/2023 2316 by Fredy Buitrago LPN  Outcome: Progressing     Problem: Chronic Conditions and Co-morbidities  Goal: Patient's chronic conditions and co-morbidity symptoms are monitored and maintained or improved  3/29/2023 1117 by Neeta Hutchinson RN  Outcome: Progressing  3/28/2023 2316 by Fredy Buitrago LPN  Outcome: Progressing

## 2023-03-29 NOTE — CARE COORDINATION
Sha Cai was evaluated today and a DME order was entered for a extra heavy duty manual wheelchair with leg rests because she requires this to successfully complete daily living tasks of eating, bathing, toileting, personal cares, ambulating, grooming, hygiene, dressing upper body, dressing lower body, meal preparation and taking own medications. A extra heavy duty manual wheelchair with leg rests is necessary due to patient's impaired ambulation and mobility restrictions and would be unable to resolve these daily living tasks using a cane or walker. The patient is capable of using a extra heavy duty wheelchair with leg rests safely in their home and can maneuver within their home with adequate access. There is a caregiver available to provide necessary assistance. The need for this equipment was discussed with the patient and she understands, is in agreement, and has not expressed an unwillingness to use the extra heavy duty manual wheelchair with leg rests.

## 2023-03-29 NOTE — CARE COORDINATION
SWING BED WEEKLY TEAM SHEET     Inez Tri   3/29/2023 WEEK # 2    Care Management    Issues to be resolved before discharge: Increased strength/balance/mobility     Family Education: Patient/staff to update     Discharge Plan: Home   Patient/Family Adjustment: N/A     Goals of previous week:   [] 1st team   [] met   [] partially met    [x] not met             Why goals were not met: Continued weakness     Skilled Level of Care Remains   [x] yes   [] no    Estimated length of stay: Insurance review was due 3/26, determination unknown at this time   Patient/Family Concerns/input: None at this time     Patient/Family  Signature _________________  3/29/2023       Care Management Signature:  India Al RN

## 2023-03-29 NOTE — PLAN OF CARE
Problem: Safety - Adult  Goal: Free from fall injury  Outcome: Progressing     Problem: Discharge Planning  Goal: Discharge to home or other facility with appropriate resources  Outcome: Progressing     Problem: Skin/Tissue Integrity  Goal: Absence of new skin breakdown  Description: 1. Monitor for areas of redness and/or skin breakdown  2. Assess vascular access sites hourly  3. Every 4-6 hours minimum:  Change oxygen saturation probe site  4. Every 4-6 hours:  If on nasal continuous positive airway pressure, respiratory therapy assess nares and determine need for appliance change or resting period.   Outcome: Progressing     Problem: ABCDS Injury Assessment  Goal: Absence of physical injury  Outcome: Progressing     Problem: Pain  Goal: Verbalizes/displays adequate comfort level or baseline comfort level  Outcome: Progressing     Problem: Chronic Conditions and Co-morbidities  Goal: Patient's chronic conditions and co-morbidity symptoms are monitored and maintained or improved  Outcome: Progressing

## 2023-03-30 LAB
GLUCOSE BLD-MCNC: 132 MG/DL (ref 70–99)
GLUCOSE BLD-MCNC: 145 MG/DL (ref 70–99)
GLUCOSE BLD-MCNC: 153 MG/DL (ref 70–99)
GLUCOSE BLD-MCNC: 220 MG/DL (ref 70–99)

## 2023-03-30 PROCEDURE — 6370000000 HC RX 637 (ALT 250 FOR IP): Performed by: NURSE PRACTITIONER

## 2023-03-30 PROCEDURE — 6370000000 HC RX 637 (ALT 250 FOR IP): Performed by: STUDENT IN AN ORGANIZED HEALTH CARE EDUCATION/TRAINING PROGRAM

## 2023-03-30 PROCEDURE — 1200000002 HC SEMI PRIVATE SWING BED

## 2023-03-30 PROCEDURE — 97110 THERAPEUTIC EXERCISES: CPT

## 2023-03-30 PROCEDURE — 6360000002 HC RX W HCPCS: Performed by: STUDENT IN AN ORGANIZED HEALTH CARE EDUCATION/TRAINING PROGRAM

## 2023-03-30 PROCEDURE — 94761 N-INVAS EAR/PLS OXIMETRY MLT: CPT

## 2023-03-30 PROCEDURE — 82962 GLUCOSE BLOOD TEST: CPT

## 2023-03-30 PROCEDURE — 6370000000 HC RX 637 (ALT 250 FOR IP): Performed by: PHYSICIAN ASSISTANT

## 2023-03-30 PROCEDURE — 97530 THERAPEUTIC ACTIVITIES: CPT

## 2023-03-30 PROCEDURE — 6360000002 HC RX W HCPCS: Performed by: NURSE PRACTITIONER

## 2023-03-30 PROCEDURE — 97116 GAIT TRAINING THERAPY: CPT

## 2023-03-30 PROCEDURE — 2700000000 HC OXYGEN THERAPY PER DAY

## 2023-03-30 RX ORDER — KETOROLAC TROMETHAMINE 30 MG/ML
30 INJECTION, SOLUTION INTRAMUSCULAR; INTRAVENOUS ONCE
Status: COMPLETED | OUTPATIENT
Start: 2023-03-30 | End: 2023-03-30

## 2023-03-30 RX ADMIN — CARVEDILOL 6.25 MG: 6.25 TABLET, FILM COATED ORAL at 08:47

## 2023-03-30 RX ADMIN — ENOXAPARIN SODIUM 40 MG: 100 INJECTION SUBCUTANEOUS at 08:47

## 2023-03-30 RX ADMIN — ENOXAPARIN SODIUM 40 MG: 100 INJECTION SUBCUTANEOUS at 21:23

## 2023-03-30 RX ADMIN — Medication 400 MG: at 08:47

## 2023-03-30 RX ADMIN — CYANOCOBALAMIN TAB 1000 MCG 1000 MCG: 1000 TAB at 08:47

## 2023-03-30 RX ADMIN — PANTOPRAZOLE SODIUM 40 MG: 40 TABLET, DELAYED RELEASE ORAL at 05:05

## 2023-03-30 RX ADMIN — ACETAMINOPHEN 650 MG: 325 TABLET ORAL at 21:23

## 2023-03-30 RX ADMIN — INSULIN LISPRO 10 UNITS: 100 INJECTION, SOLUTION INTRAVENOUS; SUBCUTANEOUS at 17:19

## 2023-03-30 RX ADMIN — Medication 400 MG: at 21:22

## 2023-03-30 RX ADMIN — KETOROLAC TROMETHAMINE 30 MG: 30 INJECTION, SOLUTION INTRAMUSCULAR; INTRAVENOUS at 13:57

## 2023-03-30 RX ADMIN — INSULIN LISPRO 1 UNITS: 100 INJECTION, SOLUTION INTRAVENOUS; SUBCUTANEOUS at 12:36

## 2023-03-30 RX ADMIN — CARVEDILOL 6.25 MG: 6.25 TABLET, FILM COATED ORAL at 16:53

## 2023-03-30 RX ADMIN — INSULIN GLARGINE 30 UNITS: 100 INJECTION, SOLUTION SUBCUTANEOUS at 21:23

## 2023-03-30 RX ADMIN — BUMETANIDE 1 MG: 1 TABLET ORAL at 08:47

## 2023-03-30 RX ADMIN — INSULIN LISPRO 10 UNITS: 100 INJECTION, SOLUTION INTRAVENOUS; SUBCUTANEOUS at 12:37

## 2023-03-30 RX ADMIN — INSULIN LISPRO 10 UNITS: 100 INJECTION, SOLUTION INTRAVENOUS; SUBCUTANEOUS at 08:51

## 2023-03-30 RX ADMIN — ACETAMINOPHEN 650 MG: 325 TABLET ORAL at 08:47

## 2023-03-30 RX ADMIN — ATORVASTATIN CALCIUM 20 MG: 20 TABLET, FILM COATED ORAL at 16:53

## 2023-03-30 ASSESSMENT — PAIN SCALES - GENERAL
PAINLEVEL_OUTOF10: 8
PAINLEVEL_OUTOF10: 4
PAINLEVEL_OUTOF10: 1
PAINLEVEL_OUTOF10: 3
PAINLEVEL_OUTOF10: 2
PAINLEVEL_OUTOF10: 0
PAINLEVEL_OUTOF10: 3

## 2023-03-30 ASSESSMENT — PAIN DESCRIPTION - DESCRIPTORS
DESCRIPTORS: ACHING
DESCRIPTORS: ACHING
DESCRIPTORS: STABBING
DESCRIPTORS: ACHING
DESCRIPTORS: ACHING

## 2023-03-30 ASSESSMENT — PAIN - FUNCTIONAL ASSESSMENT
PAIN_FUNCTIONAL_ASSESSMENT: ACTIVITIES ARE NOT PREVENTED

## 2023-03-30 ASSESSMENT — PAIN DESCRIPTION - PAIN TYPE
TYPE: ACUTE PAIN

## 2023-03-30 ASSESSMENT — PAIN DESCRIPTION - ONSET
ONSET: ON-GOING

## 2023-03-30 ASSESSMENT — PAIN DESCRIPTION - ORIENTATION
ORIENTATION: ANTERIOR
ORIENTATION: RIGHT
ORIENTATION: ANTERIOR

## 2023-03-30 ASSESSMENT — PAIN DESCRIPTION - FREQUENCY
FREQUENCY: CONTINUOUS

## 2023-03-30 ASSESSMENT — PAIN DESCRIPTION - LOCATION
LOCATION: HEAD
LOCATION: BACK
LOCATION: HEAD

## 2023-03-30 NOTE — FLOWSHEET NOTE
chair   [x] Call light within reach    [] Bed alarm on    [] Personal alarm on    [] Other staff present:  [] Family/Caregiver present:      Post Tx Pain Ratin/10 RLE      Evaluation Assessment  3/17/23  Assessment: Patient is a 62 y.o. female who presents to [facility] for the swingbed program to improve strength and endurance to increase independence with bed mobility, transfers, and ambulation. Patient would benefit from continued skilled physical therapy services to address decreased strength, ROM, and endurance, impaired balance, and decline in functional mobility.     Discharge Recommendations:  Continue to assess pending progress, Home with assist PRN, Home with Home health PT        Social/Functional History:  Lives With: Alone  Home Layout: One level  Home Access: Level entry     PT Equipment:  Home Equipment: Oxygen (Pt wears 2L of O2  and has portable and concentrator tanks)    Goals:         Short Term Goals  Time Frame for Short Term Goals: 1 week  Short Term Goal 1: Pt will perform sit><supine Mod I  Short Term Goal 2: Pt will transfer to all surfaces SBA  Short Term Goal 3: Pt will ambulate 150ft with LRAD Mod I  Short Term Goal 4: Pt will perform standing light dynamic activity x 3 minutes, single UE support if needed SBA         Time In 1010   Time Out 1050   Total Minutes 40   Billed Units 3       Signed:  Caitlin Acevedo PTA  3/30/2023,  2:09 PM

## 2023-03-30 NOTE — PROGRESS NOTES
Occupational Therapy    Occupational Therapy Treatment Note  Name: Mariela Rizzo MRN: [id number] :   1965   Date:  3/30/2023   Admission Date: 3/16/2023 Room:  [address] [place] [address]   Restrictions/Precautions:  Restrictions/Precautions  Restrictions/Precautions: Fall Risk, General Precautions     Communication with other providers:   Cleared for treatment by RN. Subjective:  Patient states:  \"I don't want to do any therapy. What are we going to do? \"  Pain:   Location, Type, Intensity (0/10 to 10/10):  5/10 R hip sciatic location    Objective:    Observation:  pt alert and oriented supine in bed, HOB elevated. Patient initially refused therapy, however once treatment session was explained, patient agreeable. Patient refused all other activity reporting \"I'm in to much pain and already had therapy today\"    Treatment, including education:  Transfers    Sit to stand :CGA to adjust positioning in bed    Therapeutic Exercise:  Patient completed BUE exercises to increase strength and ROM to facilitate an increase for ADLs and functional mobility. Patient completed BUE with red theraband including shoulder punches, bicep curls, horizontal abduction, and upwards x15 reps per movement/BUE with min rest breaks. Safety Measures: left in bed, Pull/Bed Alarm activated and call light left in reach    Assessment / Impression:        Patient's tolerance of treatment: Good   Adverse Reaction: None  Significant change in status and impact:  None  Barriers to improvement: Dec strength and endurance    Plan for Next Session:    Continue with POC. Time in:  1430  Time out:  1440  Total treatment time:  10  Billed Units: 1 TE  Electronically signed by:    KIERRA Muñoz, OTR/L [id number]    3/30/2023, 3:41 PM    Previously filed values:  Patient Goals   Patient goals :  To go home  Short Term Goals  Time Frame for Short Term Goals: Until DC or goals met  Short Term Goal 1: Pt will complete functional trfs mod I  Short Term Goal 2: Pt will complete UE ADLs mod I  Short Term Goal 3: Pt will complete LE ADLs mod I  Short Term Goal 4: Pt will tolerate >8 min of standing for theract/ADLs w/o increased SOB  Short Term Goal 5: Pt will complete BUE therex to increase strength/endurance for functional mobility/ADLs

## 2023-03-30 NOTE — CARE COORDINATION
CM faxed the DME order for an extra heavy duty manual wheelchair to [phone] to initiate the referral.

## 2023-03-30 NOTE — PLAN OF CARE
Problem: Safety - Adult  Goal: Free from fall injury  3/29/2023 2213 by Lemuel Campbell LPN  Outcome: Progressing  3/29/2023 1117 by Rito Reyes RN  Outcome: Progressing     Problem: Discharge Planning  Goal: Discharge to home or other facility with appropriate resources  3/29/2023 2213 by Lemuel Campbell LPN  Outcome: Progressing  3/29/2023 1117 by Rito Reyes RN  Outcome: Progressing     Problem: Skin/Tissue Integrity  Goal: Absence of new skin breakdown  Description: 1. Monitor for areas of redness and/or skin breakdown  2. Assess vascular access sites hourly  3. Every 4-6 hours minimum:  Change oxygen saturation probe site  4. Every 4-6 hours:  If on nasal continuous positive airway pressure, respiratory therapy assess nares and determine need for appliance change or resting period.   3/29/2023 2213 by Lemuel Campbell LPN  Outcome: Progressing  3/29/2023 1117 by Rito Reyes RN  Outcome: Progressing     Problem: ABCDS Injury Assessment  Goal: Absence of physical injury  3/29/2023 2213 by Lemuel Campbell LPN  Outcome: Progressing  3/29/2023 1117 by Rito Reyes RN  Outcome: Progressing     Problem: Pain  Goal: Verbalizes/displays adequate comfort level or baseline comfort level  3/29/2023 2213 by Lemuel Campbell LPN  Outcome: Progressing  3/29/2023 1117 by Rito Reyes RN  Outcome: Progressing     Problem: Chronic Conditions and Co-morbidities  Goal: Patient's chronic conditions and co-morbidity symptoms are monitored and maintained or improved  3/29/2023 2213 by Lemuel Campbell LPN  Outcome: Progressing  3/29/2023 1117 by Rito Reyes RN  Outcome: Progressing

## 2023-03-30 NOTE — PLAN OF CARE
Problem: Safety - Adult  Goal: Free from fall injury  3/30/2023 1037 by Inez Thacker RN  Outcome: Progressing  3/29/2023 2213 by No Villanueva LPN  Outcome: Progressing     Problem: Discharge Planning  Goal: Discharge to home or other facility with appropriate resources  3/30/2023 1037 by Inez Thacker RN  Outcome: Progressing  3/29/2023 2213 by No Villanueva LPN  Outcome: Progressing     Problem: Skin/Tissue Integrity  Goal: Absence of new skin breakdown  Description: 1. Monitor for areas of redness and/or skin breakdown  2. Assess vascular access sites hourly  3. Every 4-6 hours minimum:  Change oxygen saturation probe site  4. Every 4-6 hours:  If on nasal continuous positive airway pressure, respiratory therapy assess nares and determine need for appliance change or resting period.   3/30/2023 1037 by Inez Thacker RN  Outcome: Progressing  3/29/2023 2213 by No Villanueva LPN  Outcome: Progressing     Problem: ABCDS Injury Assessment  Goal: Absence of physical injury  3/30/2023 1037 by Inez Thacker RN  Outcome: Progressing  3/29/2023 2213 by No Villanueva LPN  Outcome: Progressing     Problem: Pain  Goal: Verbalizes/displays adequate comfort level or baseline comfort level  3/30/2023 1037 by Inez Thacker RN  Outcome: Progressing  3/29/2023 2213 by No Villanueva LPN  Outcome: Progressing     Problem: Chronic Conditions and Co-morbidities  Goal: Patient's chronic conditions and co-morbidity symptoms are monitored and maintained or improved  3/30/2023 1037 by Inez Thacker RN  Outcome: Progressing  3/29/2023 2213 by No Villanueva LPN  Outcome: Progressing

## 2023-03-30 NOTE — PROGRESS NOTES
Met with patient today for swing bed activities . Provided pt with choice of creative activity.   Maria T Macias

## 2023-03-31 LAB
ANION GAP SERPL CALCULATED.3IONS-SCNC: 11 MMOL/L (ref 4–16)
BUN SERPL-MCNC: 14 MG/DL (ref 6–23)
CALCIUM SERPL-MCNC: 8.9 MG/DL (ref 8.3–10.6)
CHLORIDE BLD-SCNC: 97 MMOL/L (ref 99–110)
CO2: 34 MMOL/L (ref 21–32)
CREAT SERPL-MCNC: 1 MG/DL (ref 0.6–1.1)
GFR SERPL CREATININE-BSD FRML MDRD: >60 ML/MIN/1.73M2
GLUCOSE BLD-MCNC: 169 MG/DL (ref 70–99)
GLUCOSE BLD-MCNC: 169 MG/DL (ref 70–99)
GLUCOSE BLD-MCNC: 185 MG/DL (ref 70–99)
GLUCOSE BLD-MCNC: 228 MG/DL (ref 70–99)
GLUCOSE SERPL-MCNC: 144 MG/DL (ref 70–99)
HCT VFR BLD CALC: 29 % (ref 37–47)
HEMOGLOBIN: 8.3 GM/DL (ref 12.5–16)
MAGNESIUM: 1.8 MG/DL (ref 1.8–2.4)
MCH RBC QN AUTO: 25.9 PG (ref 27–31)
MCHC RBC AUTO-ENTMCNC: 28.6 % (ref 32–36)
MCV RBC AUTO: 90.3 FL (ref 78–100)
PDW BLD-RTO: 14.3 % (ref 11.7–14.9)
PLATELET # BLD: 251 K/CU MM (ref 140–440)
PMV BLD AUTO: 10.6 FL (ref 7.5–11.1)
POTASSIUM SERPL-SCNC: 4.2 MMOL/L (ref 3.5–5.1)
RBC # BLD: 3.21 M/CU MM (ref 4.2–5.4)
SODIUM BLD-SCNC: 142 MMOL/L (ref 135–145)
WBC # BLD: 7 K/CU MM (ref 4–10.5)

## 2023-03-31 PROCEDURE — 97535 SELF CARE MNGMENT TRAINING: CPT

## 2023-03-31 PROCEDURE — 6370000000 HC RX 637 (ALT 250 FOR IP): Performed by: STUDENT IN AN ORGANIZED HEALTH CARE EDUCATION/TRAINING PROGRAM

## 2023-03-31 PROCEDURE — 94761 N-INVAS EAR/PLS OXIMETRY MLT: CPT

## 2023-03-31 PROCEDURE — 6360000002 HC RX W HCPCS: Performed by: STUDENT IN AN ORGANIZED HEALTH CARE EDUCATION/TRAINING PROGRAM

## 2023-03-31 PROCEDURE — 82962 GLUCOSE BLOOD TEST: CPT

## 2023-03-31 PROCEDURE — 97110 THERAPEUTIC EXERCISES: CPT

## 2023-03-31 PROCEDURE — 97116 GAIT TRAINING THERAPY: CPT

## 2023-03-31 PROCEDURE — 1200000002 HC SEMI PRIVATE SWING BED

## 2023-03-31 PROCEDURE — 6370000000 HC RX 637 (ALT 250 FOR IP): Performed by: NURSE PRACTITIONER

## 2023-03-31 PROCEDURE — 85027 COMPLETE CBC AUTOMATED: CPT

## 2023-03-31 PROCEDURE — 83735 ASSAY OF MAGNESIUM: CPT

## 2023-03-31 PROCEDURE — 80048 BASIC METABOLIC PNL TOTAL CA: CPT

## 2023-03-31 PROCEDURE — 2700000000 HC OXYGEN THERAPY PER DAY

## 2023-03-31 RX ORDER — ACETAMINOPHEN 650 MG/1
650 SUPPOSITORY RECTAL EVERY 6 HOURS PRN
Status: DISCONTINUED | OUTPATIENT
Start: 2023-03-31 | End: 2023-04-01 | Stop reason: HOSPADM

## 2023-03-31 RX ORDER — ACETAMINOPHEN 325 MG/1
650 TABLET ORAL EVERY 6 HOURS PRN
Status: DISCONTINUED | OUTPATIENT
Start: 2023-03-31 | End: 2023-04-01 | Stop reason: HOSPADM

## 2023-03-31 RX ORDER — TRAMADOL HYDROCHLORIDE 50 MG/1
50 TABLET ORAL EVERY 6 HOURS PRN
Status: DISCONTINUED | OUTPATIENT
Start: 2023-03-31 | End: 2023-04-01 | Stop reason: HOSPADM

## 2023-03-31 RX ORDER — ACETAMINOPHEN 500 MG
500 TABLET ORAL EVERY 4 HOURS PRN
Status: DISCONTINUED | OUTPATIENT
Start: 2023-03-31 | End: 2023-04-01 | Stop reason: HOSPADM

## 2023-03-31 RX ADMIN — CARVEDILOL 6.25 MG: 6.25 TABLET, FILM COATED ORAL at 17:51

## 2023-03-31 RX ADMIN — ATORVASTATIN CALCIUM 20 MG: 20 TABLET, FILM COATED ORAL at 17:51

## 2023-03-31 RX ADMIN — ACETAMINOPHEN 650 MG: 325 TABLET ORAL at 16:37

## 2023-03-31 RX ADMIN — Medication 400 MG: at 20:50

## 2023-03-31 RX ADMIN — INSULIN LISPRO 10 UNITS: 100 INJECTION, SOLUTION INTRAVENOUS; SUBCUTANEOUS at 12:25

## 2023-03-31 RX ADMIN — ENOXAPARIN SODIUM 40 MG: 100 INJECTION SUBCUTANEOUS at 20:50

## 2023-03-31 RX ADMIN — INSULIN GLARGINE 30 UNITS: 100 INJECTION, SOLUTION SUBCUTANEOUS at 21:46

## 2023-03-31 RX ADMIN — TRAMADOL HYDROCHLORIDE 50 MG: 50 TABLET ORAL at 21:46

## 2023-03-31 RX ADMIN — Medication 400 MG: at 08:30

## 2023-03-31 RX ADMIN — CARVEDILOL 6.25 MG: 6.25 TABLET, FILM COATED ORAL at 08:30

## 2023-03-31 RX ADMIN — BUMETANIDE 1 MG: 1 TABLET ORAL at 08:30

## 2023-03-31 RX ADMIN — PANTOPRAZOLE SODIUM 40 MG: 40 TABLET, DELAYED RELEASE ORAL at 05:09

## 2023-03-31 RX ADMIN — INSULIN LISPRO 10 UNITS: 100 INJECTION, SOLUTION INTRAVENOUS; SUBCUTANEOUS at 08:28

## 2023-03-31 RX ADMIN — CYANOCOBALAMIN TAB 1000 MCG 1000 MCG: 1000 TAB at 08:30

## 2023-03-31 RX ADMIN — ENOXAPARIN SODIUM 40 MG: 100 INJECTION SUBCUTANEOUS at 08:29

## 2023-03-31 RX ADMIN — ACETAMINOPHEN 650 MG: 325 TABLET ORAL at 08:29

## 2023-03-31 RX ADMIN — INSULIN LISPRO 10 UNITS: 100 INJECTION, SOLUTION INTRAVENOUS; SUBCUTANEOUS at 17:51

## 2023-03-31 ASSESSMENT — PAIN DESCRIPTION - ONSET
ONSET: ON-GOING
ONSET: ON-GOING

## 2023-03-31 ASSESSMENT — PAIN DESCRIPTION - LOCATION
LOCATION: GENERALIZED
LOCATION: GENERALIZED

## 2023-03-31 ASSESSMENT — PAIN DESCRIPTION - FREQUENCY
FREQUENCY: CONTINUOUS
FREQUENCY: CONTINUOUS

## 2023-03-31 ASSESSMENT — PAIN SCALES - GENERAL
PAINLEVEL_OUTOF10: 5
PAINLEVEL_OUTOF10: 1
PAINLEVEL_OUTOF10: 1
PAINLEVEL_OUTOF10: 5

## 2023-03-31 ASSESSMENT — PAIN DESCRIPTION - PAIN TYPE
TYPE: ACUTE PAIN
TYPE: ACUTE PAIN

## 2023-03-31 ASSESSMENT — PAIN SCALES - WONG BAKER: WONGBAKER_NUMERICALRESPONSE: 0

## 2023-03-31 ASSESSMENT — PAIN DESCRIPTION - DESCRIPTORS
DESCRIPTORS: ACHING;DISCOMFORT
DESCRIPTORS: ACHING;DISCOMFORT

## 2023-03-31 NOTE — PROGRESS NOTES
SWING BED WEEKLY TEAM SHEET     WEEK#  3     NUTRITION     Diet: Diabetic with 5 carbs/meal                      TF: n/a         TPN:   n/a  Appropriate/Adequate [X] yes [ ] no       Meal intakes: % consistently    Weight: No new weights since 3/16   BMI:   57.88           Significant Change: None noted    Recommendations:  Continue to provide current diet of diabetic, continue to document po intakes for accurate assessment of nutritional status, monitor po intakes, labs, wts, POC. Issues to be resolved before discharge: PO intakes of 75% consistently at most meals by next RD assessment.      Dietitian Signature:   Cuauhtemoc Mujica RD, LD  [phone]

## 2023-03-31 NOTE — PROGRESS NOTES
Occupational Therapy    Occupational Therapy Treatment Note  Name: Mike Pollard MRN: [id number] :   1965   Date:  3/31/2023   Admission Date: 3/16/2023 Room:  [address] [place] [address]-   Restrictions/Precautions:  Restrictions/Precautions  Restrictions/Precautions: Fall Risk, General Precautions     Communication with other providers:   Cleared for treatment by RN. Subjective:  Patient states:  \"I'm going home tomorrow\"  Pain:   Location, Type, Intensity (0/10 to 10/10):  4/10 back    Objective:    Observation:  pt alert and oriented. Treatment, including education:  Transfers    Sit to stand Sup  Stand to sit :Sup  SPT:sup  Shower:Sup      Self Care Training:   Activities performed today included the following:    Grooming  Mod I to blow dry hair and brush hair    Bathing   Mod I to wash on shower bench using handrail for balance. UB Dress  Sup to don gown    LB Dress  Sup using sock aide to put on B Socks and pt able to use reacher to don brief over feet. Therapeutic Exercise: Pt completed UE exercises to increase strength and ROM to facilitate increase for ADLs and functional mobility. Pt completed B UEs with 3# dumbbell exercises with curls, shoulder presses, punch, stirring and wrist curls x 20 reps with mod rest breaks due to fatigue. Pt completed red theraband pulls horizontally, punches and upwards x 20 reps with mod rest breaks. Safety Measures: Gait belt used, Left in bed, Pull/Bed Alarm activated and call light left in reach        Assessment / Impression:        Patient's tolerance of treatment: Good   Adverse Reaction: None  Significant change in status and impact:  None  Barriers to improvement:  dec strength and endurance    Plan for Next Session:    Continue with POC.     Time in:  1020 1315  Time out:  1100 1600  Total treatment time:  40 + 45=85  Billed Units: 4 ADL, 2 TE  Electronically signed by:    Nhi [address]    3/31/2023, 10:55 AM    Previously filed values:  Patient Goals   Patient goals :  To go home  Short Term Goals  Time Frame for Short Term Goals: Until DC or goals met  Short Term Goal 1: Pt will complete functional trfs mod I  Short Term Goal 2: Pt will complete UE ADLs mod I  Short Term Goal 3: Pt will complete LE ADLs mod I  Short Term Goal 4: Pt will tolerate >8 min of standing for theract/ADLs w/o increased SOB  Short Term Goal 5: Pt will complete BUE therex to increase strength/endurance for functional mobility/ADLs

## 2023-03-31 NOTE — PROGRESS NOTES
Met with patient today for swing bed activities. Provided patient with Food Friday snack in a Baseball cap bowl for Baseball season. Also provided patient with activity materials for this weekend, spiritual reflection. Patient was not feeling up to anything crafty today so pt picked out a craft to take home tomorrow to complete.    Austen Salts

## 2023-03-31 NOTE — FLOWSHEET NOTE
Other:     Safety Precautions:     [] Left in bed    [x] Left in chair   [x] Call light within reach    [] Bed alarm on    [] Personal alarm on    [] Other staff present:  [] Family/Caregiver present:      Post Tx Pain Ratin/10 RLE      Evaluation Assessment  3/17/23  Assessment: Patient is a 62 y.o. female who presents to [facility] for the swingbed program to improve strength and endurance to increase independence with bed mobility, transfers, and ambulation. Patient would benefit from continued skilled physical therapy services to address decreased strength, ROM, and endurance, impaired balance, and decline in functional mobility.     Discharge Recommendations:  Continue to assess pending progress, Home with assist PRN, Home with Home health PT      Social/Functional History:  Lives With: Alone  Home Layout: One level  Home Access: Level entry     PT Equipment:  Home Equipment: Oxygen (Pt wears 2L of O2  and has portable and concentrator tanks)    Goals:         Short Term Goals  Time Frame for Short Term Goals: 1 week  Short Term Goal 1: Pt will perform sit><supine Mod I  Short Term Goal 2: Pt will transfer to all surfaces SBA  Short Term Goal 3: Pt will ambulate 150ft with LRAD Mod I  Short Term Goal 4: Pt will perform standing light dynamic activity x 3 minutes, single UE support if needed SBA         Time In 1045   Time Out 1104   Total Minutes 19   Billed Units 1Gt       Signed:  Marielena De La Rosa PT, DPT [id number]  3/31/2023,  1:04 PM

## 2023-03-31 NOTE — PROGRESS NOTES
Hyperlipidemia continue atorvastatin  9. Morbid obesity with body mass index of 57.88, lifestyle modifications and exercise discussed          Diet ADULT DIET; Regular; 5 carb choices (75 gm/meal)   DVT Prophylaxis [x] Lovenox, []  Heparin, [] SCDs, [] Ambulation,  [] Eliquis, [] Xarelto  [] Coumadin   Code Status Full Code   Disposition From: [facility]  Expected Disposition: Home   Estimated Date of Discharge: 4/1/2023  Patient requires continued admission due to debility   Surrogate Decision Maker/ [address] [place] [address]         Subjective:     Chief Complaint: Debility    Patient seen and examined today at bedside she has no complaints she continues to work with physical and Occupational Therapy. Plan is for discharge home on Saturday, 4/1/2023         Review of Systems:      10 point review of systems complete is negative unless stated above    Objective: Intake/Output Summary (Last 24 hours) at 3/31/2023 1136  Last data filed at 3/31/2023 0912  Gross per 24 hour   Intake 360 ml   Output --   Net 360 ml        Vitals:   Vitals:    03/31/23 0915   BP:    Pulse:    Resp:    Temp:    SpO2: 94%       Physical Exam:     General: NAD, morbidly obese  Eyes: EOMI  ENT: neck supple  Cardiovascular: Regular rate and rhythm  Respiratory: Clear to auscultation bilaterally throughout all lung fields but diminished most likely secondary to body habitus  Gastrointestinal: Soft, non tender, nontender, obese, bowel sounds present all 4 quadrants  Genitourinary: no suprapubic tenderness  Musculoskeletal: Chronic bilateral lower extremity edema, trace  Skin: warm, dry  Neuro: Alert and oriented x4  Psych: Mood appropriate.      Medications:   Medications:    magnesium oxide  400 mg Oral BID    insulin lispro  10 Units SubCUTAneous TID WC    insulin glargine  30 Units SubCUTAneous Nightly    insulin lispro  0-4 Units SubCUTAneous TID WC    insulin lispro  0-4 Units SubCUTAneous Nightly    carvedilol  6.25 mg Oral BID WC    atorvastatin 20 mg Oral QPM    cyanocobalamin  1,000 mcg Oral Daily    bumetanide  1 mg Oral Daily    enoxaparin  40 mg SubCUTAneous BID    pantoprazole  40 mg Oral QAM AC      Infusions:   PRN Meds: acetaminophen, 650 mg, Q6H PRN   Or  acetaminophen, 650 mg, Q6H PRN  acetaminophen, 500 mg, Q4H PRN  traMADol, 50 mg, Q6H PRN  albuterol sulfate HFA, 2 puff, Q6H PRN  glucose, 4 tablet, PRN  glucagon (rDNA), 1 mg, PRN  ondansetron, 4 mg, Q8H PRN   Or  ondansetron, 4 mg, Q6H PRN  polyethylene glycol, 17 g, Daily PRN        Labs      Recent Results (from the past 24 hour(s))   POCT Glucose    Collection Time: 03/30/23 11:56 AM   Result Value Ref Range    POC Glucose 220 (H) 70 - 99 MG/DL   POCT Glucose    Collection Time: 03/30/23  4:47 PM   Result Value Ref Range    POC Glucose 145 (H) 70 - 99 MG/DL   POCT Glucose    Collection Time: 03/30/23  9:16 PM   Result Value Ref Range    POC Glucose 153 (H) 70 - 99 MG/DL   POCT Glucose    Collection Time: 03/31/23  7:50 AM   Result Value Ref Range    POC Glucose 169 (H) 70 - 99 MG/DL        Imaging/Diagnostics Last 24 Hours   No results found.     Electronically signed by MASON Yu NP on 3/31/2023 at 11:36 AM

## 2023-04-01 VITALS
DIASTOLIC BLOOD PRESSURE: 70 MMHG | SYSTOLIC BLOOD PRESSURE: 118 MMHG | RESPIRATION RATE: 16 BRPM | BODY MASS INDEX: 48.82 KG/M2 | OXYGEN SATURATION: 96 % | TEMPERATURE: 97.9 F | HEART RATE: 78 BPM | HEIGHT: 65 IN | WEIGHT: 293 LBS

## 2023-04-01 LAB — GLUCOSE BLD-MCNC: 145 MG/DL (ref 70–99)

## 2023-04-01 PROCEDURE — 6370000000 HC RX 637 (ALT 250 FOR IP): Performed by: NURSE PRACTITIONER

## 2023-04-01 PROCEDURE — 2700000000 HC OXYGEN THERAPY PER DAY

## 2023-04-01 PROCEDURE — 6370000000 HC RX 637 (ALT 250 FOR IP): Performed by: STUDENT IN AN ORGANIZED HEALTH CARE EDUCATION/TRAINING PROGRAM

## 2023-04-01 PROCEDURE — 82962 GLUCOSE BLOOD TEST: CPT

## 2023-04-01 PROCEDURE — 6360000002 HC RX W HCPCS: Performed by: STUDENT IN AN ORGANIZED HEALTH CARE EDUCATION/TRAINING PROGRAM

## 2023-04-01 PROCEDURE — 94761 N-INVAS EAR/PLS OXIMETRY MLT: CPT

## 2023-04-01 RX ORDER — PANTOPRAZOLE SODIUM 40 MG/1
40 TABLET, DELAYED RELEASE ORAL
Qty: 30 TABLET | Refills: 3 | Status: SHIPPED | OUTPATIENT
Start: 2023-04-02

## 2023-04-01 RX ORDER — INSULIN LISPRO 100 [IU]/ML
10 INJECTION, SOLUTION INTRAVENOUS; SUBCUTANEOUS
Qty: 10 ML | Refills: 0 | Status: SHIPPED | OUTPATIENT
Start: 2023-04-01

## 2023-04-01 RX ORDER — LANOLIN ALCOHOL/MO/W.PET/CERES
400 CREAM (GRAM) TOPICAL DAILY
Qty: 30 TABLET | Refills: 0 | Status: SHIPPED | OUTPATIENT
Start: 2023-04-01

## 2023-04-01 RX ORDER — INSULIN LISPRO 100 [IU]/ML
0-4 INJECTION, SOLUTION INTRAVENOUS; SUBCUTANEOUS
Qty: 10 ML | Refills: 0 | Status: SHIPPED | OUTPATIENT
Start: 2023-04-01

## 2023-04-01 RX ORDER — INSULIN LISPRO 100 [IU]/ML
0-4 INJECTION, SOLUTION INTRAVENOUS; SUBCUTANEOUS NIGHTLY
Qty: 10 ML | Refills: 0 | Status: SHIPPED | OUTPATIENT
Start: 2023-04-01

## 2023-04-01 RX ADMIN — ENOXAPARIN SODIUM 40 MG: 100 INJECTION SUBCUTANEOUS at 09:05

## 2023-04-01 RX ADMIN — Medication 400 MG: at 09:04

## 2023-04-01 RX ADMIN — PANTOPRAZOLE SODIUM 40 MG: 40 TABLET, DELAYED RELEASE ORAL at 05:52

## 2023-04-01 RX ADMIN — ACETAMINOPHEN 650 MG: 325 TABLET ORAL at 09:05

## 2023-04-01 RX ADMIN — BUMETANIDE 1 MG: 1 TABLET ORAL at 09:04

## 2023-04-01 RX ADMIN — INSULIN LISPRO 10 UNITS: 100 INJECTION, SOLUTION INTRAVENOUS; SUBCUTANEOUS at 10:10

## 2023-04-01 RX ADMIN — CYANOCOBALAMIN TAB 1000 MCG 1000 MCG: 1000 TAB at 09:06

## 2023-04-01 RX ADMIN — CARVEDILOL 6.25 MG: 6.25 TABLET, FILM COATED ORAL at 09:05

## 2023-04-01 ASSESSMENT — PAIN DESCRIPTION - LOCATION: LOCATION: HIP

## 2023-04-01 ASSESSMENT — PAIN SCALES - GENERAL
PAINLEVEL_OUTOF10: 0
PAINLEVEL_OUTOF10: 5

## 2023-04-01 ASSESSMENT — PAIN DESCRIPTION - ORIENTATION: ORIENTATION: RIGHT

## 2023-04-01 ASSESSMENT — PAIN DESCRIPTION - DESCRIPTORS: DESCRIPTORS: ACHING;DISCOMFORT

## 2023-04-01 ASSESSMENT — PAIN - FUNCTIONAL ASSESSMENT: PAIN_FUNCTIONAL_ASSESSMENT: ACTIVITIES ARE NOT PREVENTED

## 2023-04-01 NOTE — PROGRESS NOTES
Went over discharge paperwork with patient. Advised of medication change. Patient daughter picked her up and took her home.

## 2023-04-01 NOTE — DISCHARGE SUMMARY
other medications prescribed for you. Read the directions carefully, and ask your doctor or other care provider to review them with you. CONTINUE taking these medications      atorvastatin 20 MG tablet  Commonly known as: LIPITOR  Take 1 tablet by mouth every evening     bumetanide 1 MG tablet  Commonly known as: BUMEX  Take 1 tablet by mouth daily     carvedilol 6.25 MG tablet  Commonly known as: COREG     cyanocobalamin 100 MCG tablet     insulin glargine-yfgn 100 UNIT/ML injection vial  Commonly known as: SEMGLEE-YFGN            STOP taking these medications      glipiZIDE 10 MG extended release tablet  Commonly known as: GLUCOTROL XL     metFORMIN 1000 MG tablet  Commonly known as: GLUCOPHAGE               Where to Get Your Medications        These medications were sent to [facility]/pharmacy #[id number]- [place], [place] - [address] [facility] [phone] - F [phone]  [address] [place] [address] Dr      Phone: [phone]   glucose 4 g chewable tablet  insulin lispro 100 UNIT/ML Soln injection vial  insulin lispro 100 UNIT/ML Soln injection vial  insulin lispro 100 UNIT/ML Soln injection vial  magnesium oxide 400 (240 Mg) MG tablet  pantoprazole 40 MG tablet        Objective Findings at Discharge:   /70   Pulse 78   Temp 97.9 °F (36.6 °C) (Oral)   Resp 16   Ht 5' 5\" (1.651 m)   Wt (!) 347 lb 12.8 oz (157.8 kg)   LMP  (LMP Unknown)   SpO2 96%   BMI 57.88 kg/m²       Physical Exam:   General: NAD, morbidly obese  Eyes: EOMI  ENT: neck supple  Cardiovascular: Regular rate.   Respiratory: Clear to auscultation bilaterally throughout all lung fields diminished bases most likely secondary to large body habitus  Gastrointestinal: Soft, non tender, nondistended bowel sounds present in all 4 quadrants abdomen is obese  Genitourinary: no suprapubic tenderness  Musculoskeletal: Trace lower extremity edema, chronic, no gross joint deformities  Skin: warm, dry, normal coloration  Neuro: Alert and oriented x4  Psych: Mood appropriate. Flat affect        Labs and Imaging   No results found. CBC:   Recent Labs     03/31/23  1600   WBC 7.0   HGB 8.3*        BMP:    Recent Labs     03/31/23  1600      K 4.2   CL 97*   CO2 34*   BUN 14   CREATININE 1.0   GLUCOSE 144*       Lipids:   Lab Results   Component Value Date/Time    CHOL 97 03/12/2023 04:14 AM    HDL 35 03/12/2023 04:14 AM    TRIG 126 03/12/2023 04:14 AM     Hemoglobin A1C:   Lab Results   Component Value Date/Time    LABA1C 9.5 02/14/2023 12:44 AM     TSH: No results found for: TSH  Troponin:   Lab Results   Component Value Date/Time    TROPONINT 0.062 03/12/2023 04:14 AM    TROPONINT 0.060 03/11/2023 07:20 PM       UA:  Lab Results   Component Value Date/Time    NITRU NEGATIVE 02/12/2023 07:47 PM    COLORU YELLOW 02/12/2023 07:47 PM    WBCUA NONE SEEN 02/12/2023 07:47 PM    RBCUA 1 02/12/2023 07:47 PM    TRICHOMONAS NONE SEEN 02/12/2023 07:47 PM    BACTERIA NEGATIVE 02/12/2023 07:47 PM    CLARITYU CLEAR 02/12/2023 07:47 PM    SPECGRAV 1.025 02/12/2023 07:47 PM    LEUKOCYTESUR NEGATIVE 02/12/2023 07:47 PM    UROBILINOGEN 1.0 02/12/2023 07:47 PM    BILIRUBINUR MODERATE NUMBER OR AMOUNT OBSERVED 02/12/2023 07:47 PM    BLOODU LARGE NUMBER OR AMOUNT OBSERVED 02/12/2023 07:47 PM    KETUA >80 02/12/2023 07:47 PM       Time Spent Discharging patient 35 minutes. Discharge plan has been discussed with my supervising physician Dr. Patrick Damon. He is in agreement with this plan.     Electronically signed by MASON Yates NP on 4/1/2023 at 10:23 AM

## 2023-07-20 ENCOUNTER — TRANSCRIBE ORDERS (OUTPATIENT)
Dept: SLEEP CENTER | Age: 58
End: 2023-07-20

## 2023-07-20 DIAGNOSIS — G47.34 NOCTURNAL HYPOXEMIA: Primary | ICD-10-CM

## 2023-07-20 DIAGNOSIS — I27.20 PULMONARY HYPERTENSION (HCC): ICD-10-CM

## 2023-07-20 DIAGNOSIS — I50.32 CHRONIC DIASTOLIC CONGESTIVE HEART FAILURE (HCC): ICD-10-CM

## 2023-09-14 ENCOUNTER — HOSPITAL ENCOUNTER (OUTPATIENT)
Dept: SLEEP CENTER | Age: 58
Discharge: HOME | End: 2023-09-14
Payer: MEDICARE

## 2023-09-14 DIAGNOSIS — G47.34 NOCTURNAL HYPOXEMIA: ICD-10-CM

## 2023-09-14 DIAGNOSIS — I50.32 CHRONIC DIASTOLIC CONGESTIVE HEART FAILURE (HCC): ICD-10-CM

## 2023-09-14 DIAGNOSIS — I27.20 PULMONARY HYPERTENSION (HCC): ICD-10-CM

## 2023-09-14 PROCEDURE — 95811 POLYSOM 6/>YRS CPAP 4/> PARM: CPT

## 2023-09-14 ASSESSMENT — SLEEP AND FATIGUE QUESTIONNAIRES
HOW LIKELY ARE YOU TO NOD OFF OR FALL ASLEEP WHILE SITTING QUIETLY AFTER LUNCH WITHOUT ALCOHOL: 1
HOW LIKELY ARE YOU TO NOD OFF OR FALL ASLEEP WHILE SITTING INACTIVE IN A PUBLIC PLACE: 1
HOW LIKELY ARE YOU TO NOD OFF OR FALL ASLEEP WHILE SITTING AND READING: 1
HOW LIKELY ARE YOU TO NOD OFF OR FALL ASLEEP IN A CAR, WHILE STOPPED FOR A FEW MINUTES IN TRAFFIC: 1
HOW LIKELY ARE YOU TO NOD OFF OR FALL ASLEEP WHILE LYING DOWN TO REST IN THE AFTERNOON WHEN CIRCUMSTANCES PERMIT: 1
HOW LIKELY ARE YOU TO NOD OFF OR FALL ASLEEP WHILE SITTING AND TALKING TO SOMEONE: 1
ESS TOTAL SCORE: 9
HOW LIKELY ARE YOU TO NOD OFF OR FALL ASLEEP WHEN YOU ARE A PASSENGER IN A CAR FOR AN HOUR WITHOUT A BREAK: 1
HOW LIKELY ARE YOU TO NOD OFF OR FALL ASLEEP WHILE WATCHING TV: 2

## 2023-09-15 NOTE — PROGRESS NOTES
9/15/2023  sleep study  for Lazarus Evener  1965 is complete. Results are pending physician review.     Electronically signed by Wilder Russell RCP on 9/15/2023 at 7:01 AM

## 2024-02-08 NOTE — PROGRESS NOTES
Occupational Therapy    Occupational Therapy Treatment Note  Name: Christian Fisher MRN: [id number] :   1965   Date:  3/29/2023   Admission Date: 3/16/2023 Room:  [address] [place] [address]   Restrictions/Precautions:  Restrictions/Precautions  Restrictions/Precautions: Fall Risk, General Precautions     Communication with other providers:   Cleared for treatment by RN. Subjective:  Patient states:  \"my back feels better\"  Pain:   Location, Type, Intensity (0/10 to 10/10):  none noted    Objective:    Observation:  pt alert and oriented up in recliner. Treatment, including education:  Transfers    Sit to stand :CGA  Stand to sit :CGA  SPT:CGA      Self Care Training:   Activities performed today included the following:      Grooming  Mod I to wash face and comb hair    Bathing   Min A to wash buttocks and pt able to wash elicia area while standing at RW    UB Dress  pt donned gown    Therapeutic Exercise:  Cues were given for technique, safety, recruitment, and rationale. Cues were verbal and/or tactile. Pt completed UE exercises to increase strength and ROM to facilitate increase for ADLs and functional mobility. Pt completed B UEs with 3# dumbbell exercises with curls, shoulder presses, punch, stirring and wrist curls x 20 reps with mod rest breaks due to fatigue. Pt completed red theraband pulls horizontally, punches and upwards x 20 reps with mod rest breaks. Safety Measures: Gait belt used, Left in chair, Pull/Bed Alarm activated and call light left in reach        Assessment / Impression:        Patient's tolerance of treatment: Good   Adverse Reaction: None  Significant change in status and impact:  None  Barriers to improvement: Dec strength and endurance    Plan for Next Session:    Continue with POC.     Time in:  910  Time out:  1005  Total treatment time:  55  Billed Units: 1 ADL, 1 TA, 2TE  Electronically signed by:    Tobi Gibbons [address]    3/29/2023, 10:13 AM    Previously filed No.

## (undated) DEVICE — Device

## (undated) DEVICE — FORCEPS BX L240CM JAW DIA2.8MM L CAP W/ NDL MIC MESH TOOTH

## (undated) DEVICE — ENDOSCOPY KIT: Brand: MEDLINE INDUSTRIES, INC.

## (undated) DEVICE — Z DISCONTINUED (USE MFG CAT MVABO)  TUBING GAS SAMPLING STD 6.5 FT FEMALE CONN SMRT CAPNOLINE